# Patient Record
Sex: FEMALE | Race: WHITE | NOT HISPANIC OR LATINO | Employment: FULL TIME | URBAN - METROPOLITAN AREA
[De-identification: names, ages, dates, MRNs, and addresses within clinical notes are randomized per-mention and may not be internally consistent; named-entity substitution may affect disease eponyms.]

---

## 2017-07-25 ENCOUNTER — HOSPITAL ENCOUNTER (OUTPATIENT)
Facility: MEDICAL CENTER | Age: 37
End: 2017-07-25
Payer: COMMERCIAL

## 2017-07-25 LAB
BDY FAT % MEASURED: 38.4 %
BP DIAS: 70 MMHG
BP SYS: 104 MMHG
CHOLEST SERPL-MCNC: 150 MG/DL (ref 100–199)
DIABETES HTDIA: NO
EVENT NAME HTEVT: NORMAL
FASTING HTFAS: YES
GLUCOSE SERPL-MCNC: 75 MG/DL (ref 65–99)
HDLC SERPL-MCNC: 42 MG/DL
HYPERTENSION HTHYP: NO
LDLC SERPL CALC-MCNC: 74 MG/DL
SCREENING LOC CITY HTCIT: NORMAL
SCREENING LOC STATE HTSTA: NORMAL
SCREENING LOCATION HTLOC: NORMAL
SMOKING HTSMO: NO
SUBSCRIBER ID HTSID: NORMAL
TRIGL SERPL-MCNC: 168 MG/DL (ref 0–149)

## 2017-07-25 PROCEDURE — S5190 WELLNESS ASSESSMENT BY NONPH: HCPCS

## 2017-07-25 PROCEDURE — 82947 ASSAY GLUCOSE BLOOD QUANT: CPT

## 2017-07-25 PROCEDURE — 80061 LIPID PANEL: CPT

## 2017-10-10 ENCOUNTER — APPOINTMENT (OUTPATIENT)
Dept: SOCIAL WORK | Facility: CLINIC | Age: 37
End: 2017-10-10

## 2017-10-10 PROCEDURE — 90686 IIV4 VACC NO PRSV 0.5 ML IM: CPT | Performed by: REGISTERED NURSE

## 2017-12-16 ENCOUNTER — OFFICE VISIT (OUTPATIENT)
Dept: URGENT CARE | Facility: CLINIC | Age: 37
End: 2017-12-16
Payer: COMMERCIAL

## 2017-12-16 VITALS
WEIGHT: 187 LBS | HEART RATE: 92 BPM | SYSTOLIC BLOOD PRESSURE: 122 MMHG | TEMPERATURE: 98.5 F | HEIGHT: 62 IN | DIASTOLIC BLOOD PRESSURE: 90 MMHG | BODY MASS INDEX: 34.41 KG/M2 | OXYGEN SATURATION: 95 % | RESPIRATION RATE: 15 BRPM

## 2017-12-16 DIAGNOSIS — J06.9 PROTRACTED URI: ICD-10-CM

## 2017-12-16 PROCEDURE — 99214 OFFICE O/P EST MOD 30 MIN: CPT | Performed by: PHYSICIAN ASSISTANT

## 2017-12-16 RX ORDER — METHYLPREDNISOLONE 4 MG/1
TABLET ORAL
Qty: 21 TAB | Refills: 0 | Status: SHIPPED | OUTPATIENT
Start: 2017-12-16 | End: 2018-01-03

## 2017-12-16 RX ORDER — AZITHROMYCIN 250 MG/1
TABLET, FILM COATED ORAL
Qty: 6 TAB | Refills: 0 | Status: SHIPPED | OUTPATIENT
Start: 2017-12-16 | End: 2018-01-03

## 2017-12-16 RX ORDER — CALCIUM CARBONATE 500 MG/1
500 TABLET, CHEWABLE ORAL DAILY
COMMUNITY
End: 2021-09-08

## 2017-12-16 RX ORDER — FAMOTIDINE 20 MG/1
20 TABLET, FILM COATED ORAL 2 TIMES DAILY
COMMUNITY
End: 2018-04-11

## 2017-12-16 RX ORDER — BENZONATATE 200 MG/1
200 CAPSULE ORAL 3 TIMES DAILY PRN
Qty: 30 CAP | Refills: 0 | Status: SHIPPED | OUTPATIENT
Start: 2017-12-16 | End: 2018-01-03

## 2017-12-16 ASSESSMENT — ENCOUNTER SYMPTOMS
DIZZINESS: 0
FOCAL WEAKNESS: 0
CHILLS: 0
SORE THROAT: 0
FEVER: 0
PALPITATIONS: 0
RHINORRHEA: 1
COUGH: 1
ABDOMINAL PAIN: 0
SHORTNESS OF BREATH: 0
SPUTUM PRODUCTION: 1
WHEEZING: 0
MYALGIAS: 0
SINUS PAIN: 0
NAUSEA: 0
DIARRHEA: 0
HEADACHES: 1
SENSORY CHANGE: 0
VOMITING: 0
TINGLING: 0

## 2017-12-16 ASSESSMENT — COPD QUESTIONNAIRES: COPD: 0

## 2017-12-16 NOTE — PROGRESS NOTES
Subjective:      Lily Vasquez is a 37 y.o. female who presents with URI (started with cold, turn into sinus, now cough, headache, not able to sleep due to coughing x4wks now)            Cough   This is a new problem. Episode onset: 4 weeks  The problem has been gradually worsening. The cough is productive of sputum and productive of purulent sputum. Associated symptoms include headaches, nasal congestion and rhinorrhea. Pertinent negatives include no chest pain, chills, ear congestion, ear pain, fever, myalgias, rash, sore throat, shortness of breath or wheezing. The symptoms are aggravated by lying down. Treatments tried: James, Ibuprofen, Cepacol. The treatment provided mild relief. There is no history of asthma, bronchitis, COPD or pneumonia.       Past Medical History:   Diagnosis Date   • Anesthesia     Severe PONV   • Arthritis     TMJ   • Dental disorder     permanent retainer   • Fatty liver    • GERD (gastroesophageal reflux disease)    • Gestational diabetes    • Hemorrhoids    • Hiatus hernia syndrome    • Indigestion     GERD   • Neck pain    • Vitamin D deficiency      Past Surgical History:   Procedure Laterality Date   • LISBET BY LAPAROSCOPY  12/18/2012    Performed by Idania Camara M.D. at SURGERY [facility] ORS   • LARYNGOSCOPY  7/6/2011    Performed by EDNA MINA at SURGERY SAME DAY [facility] ORS   • ESOPHAGOSCOPY  7/6/2011    Performed by EDNA MINA at SURGERY SAME DAY [facility] ORS   • TONSILLECTOMY AND ADENOIDECTOMY  1991   • OTHER  1991    TONSILLECTOMY        Family History   Problem Relation Age of Onset   • Arthritis Mother    • Hypertension Mother    • Hyperlipidemia Mother    • Psychiatry Father    • Heart Disease Maternal Grandmother    • Heart Disease Maternal Grandfather    • Heart Disease Paternal Grandmother    • Diabetes Paternal Grandmother    • Cancer Paternal Grandfather    • Stroke         Allergies   Allergen Reactions   • Codeine Vomiting     abd pain   •  "Morphine Vomiting   • Sulfa Drugs Vomiting     abd pain   • Tape Contact Dermatitis       Medications, Allergies, and current problem list reviewed today in Epic    Review of Systems   Constitutional: Positive for malaise/fatigue. Negative for chills and fever.   HENT: Positive for congestion and rhinorrhea. Negative for ear discharge, ear pain, sinus pain and sore throat.    Respiratory: Positive for cough and sputum production. Negative for shortness of breath and wheezing.    Cardiovascular: Negative for chest pain, palpitations and leg swelling.   Gastrointestinal: Negative for abdominal pain, diarrhea, nausea and vomiting.   Musculoskeletal: Negative for myalgias.   Skin: Negative for rash.   Neurological: Positive for headaches. Negative for dizziness, tingling, sensory change and focal weakness.       All other systems reviewed and are negative.      Objective:     /90   Pulse 92   Temp 36.9 °C (98.5 °F)   Resp 15   Ht 1.575 m (5' 2\")   Wt 84.8 kg (187 lb)   SpO2 95%   Breastfeeding? No   BMI 34.20 kg/m²      Physical Exam   Constitutional: She is oriented to person, place, and time. She appears well-developed and well-nourished. No distress.   HENT:   Head: Normocephalic and atraumatic.   Right Ear: Tympanic membrane, external ear and ear canal normal.   Left Ear: Tympanic membrane, external ear and ear canal normal.   Nose: Nose normal.   Mouth/Throat: Uvula is midline, oropharynx is clear and moist and mucous membranes are normal.   Neck: Neck supple.   Cardiovascular: Normal rate, regular rhythm and normal heart sounds.  Exam reveals no gallop and no friction rub.    No murmur heard.  Pulmonary/Chest: Effort normal. No respiratory distress. She has no decreased breath sounds. She has wheezes (mild wheeze in RUL). She has no rhonchi. She has no rales.   Lymphadenopathy:     She has no cervical adenopathy.   Neurological: She is alert and oriented to person, place, and time. No cranial nerve " deficit.   Skin: Skin is warm and dry. No rash noted.   Psychiatric: She has a normal mood and affect. Her behavior is normal. Judgment and thought content normal.               Assessment/Plan:     1. Protracted URI  azithromycin (ZITHROMAX) 250 MG Tab    MethylPREDNISolone (MEDROL DOSEPAK) 4 MG Tablet Therapy Pack    benzonatate (TESSALON) 200 MG capsule        •  azithromycin (ZITHROMAX) 250 MG Tab, Take as directed on pack. 1 pack., Disp: 6 Tab, Rfl: 0  •  MethylPREDNISolone (MEDROL DOSEPAK) 4 MG Tablet Therapy Pack, Take as directed on pack. 1 pack., Disp: 21 Tab, Rfl: 0  •  benzonatate (TESSALON) 200 MG capsule, Take 1 Cap by mouth 3 times a day as needed., Disp: 30 Cap, Rfl: 0       Differential diagnoses, Supportive care, and indications for immediate follow-up discussed with patient.   Instructed to return to clinic or nearest emergency department for any change in condition, further concerns, or worsening of symptoms.    The patient demonstrated a good understanding and agreed with the treatment plan.    Raine Alexandra P.A.-C.

## 2017-12-23 ENCOUNTER — OFFICE VISIT (OUTPATIENT)
Dept: URGENT CARE | Facility: CLINIC | Age: 37
End: 2017-12-23
Payer: COMMERCIAL

## 2017-12-23 VITALS
RESPIRATION RATE: 20 BRPM | HEIGHT: 62 IN | SYSTOLIC BLOOD PRESSURE: 128 MMHG | WEIGHT: 187 LBS | DIASTOLIC BLOOD PRESSURE: 82 MMHG | OXYGEN SATURATION: 99 % | HEART RATE: 80 BPM | TEMPERATURE: 98 F | BODY MASS INDEX: 34.41 KG/M2

## 2017-12-23 DIAGNOSIS — J01.90 ACUTE BACTERIAL SINUSITIS: ICD-10-CM

## 2017-12-23 DIAGNOSIS — B96.89 ACUTE BACTERIAL SINUSITIS: ICD-10-CM

## 2017-12-23 PROCEDURE — 99214 OFFICE O/P EST MOD 30 MIN: CPT | Performed by: NURSE PRACTITIONER

## 2017-12-23 RX ORDER — AMOXICILLIN AND CLAVULANATE POTASSIUM 875; 125 MG/1; MG/1
1 TABLET, FILM COATED ORAL 2 TIMES DAILY
Qty: 14 TAB | Refills: 0 | Status: SHIPPED | OUTPATIENT
Start: 2017-12-23 | End: 2017-12-30

## 2017-12-23 ASSESSMENT — ENCOUNTER SYMPTOMS
FEVER: 0
SINUS PRESSURE: 1
HOARSE VOICE: 1
SORE THROAT: 1
SPUTUM PRODUCTION: 1
SINUS PAIN: 1
HEADACHES: 1
COUGH: 1

## 2017-12-23 NOTE — PROGRESS NOTES
Subjective:      Lily Vasquez is a 37 y.o. female who presents with Sinus Problem (X 5 wks stuffy nose , postnasal dripping .)            Sinus Problem   This is a new problem. Episode onset: Pt reports she has had sinus congestion, pain and pressure for 5 weeks. She was treated last week with a Zpak and has not experienced any improvement. She feels her cough is worse as well. The problem has been gradually worsening since onset. There has been no fever. Associated symptoms include congestion, coughing, headaches, a hoarse voice, sinus pressure and a sore throat. Past treatments include antibiotics and oral decongestants. The treatment provided no relief.       Review of Systems   Constitutional: Positive for malaise/fatigue. Negative for fever.   HENT: Positive for congestion, hoarse voice, sinus pain, sinus pressure and sore throat.    Respiratory: Positive for cough and sputum production.    Neurological: Positive for headaches.   All other systems reviewed and are negative.    Past Medical History:   Diagnosis Date   • Anesthesia     Severe PONV   • Arthritis     TMJ   • Dental disorder     permanent retainer   • Fatty liver    • GERD (gastroesophageal reflux disease)    • Gestational diabetes    • Hemorrhoids    • Hiatus hernia syndrome    • Indigestion     GERD   • Neck pain    • Vitamin D deficiency       Past Surgical History:   Procedure Laterality Date   • LISBET BY LAPAROSCOPY  12/18/2012    Performed by Idania Camara M.D. at SURGERY [facility] ORS   • LARYNGOSCOPY  7/6/2011    Performed by EDNA MINA at SURGERY SAME DAY [facility] ORS   • ESOPHAGOSCOPY  7/6/2011    Performed by EDNA MINA at SURGERY SAME DAY [facility] ORS   • TONSILLECTOMY AND ADENOIDECTOMY  1991   • OTHER  1991    TONSILLECTOMY       Social History     Social History   • Marital status:      Spouse name: N/A   • Number of children: N/A   • Years of education: N/A     Occupational History   • Not on file.     Social  "History Main Topics   • Smoking status: Never Smoker   • Smokeless tobacco: Never Used   • Alcohol use No   • Drug use: No   • Sexual activity: Not on file     Other Topics Concern   • Not on file     Social History Narrative   • No narrative on file          Objective:     /82   Pulse 80   Temp 36.7 °C (98 °F)   Resp 20   Ht 1.575 m (5' 2\")   Wt 84.8 kg (187 lb)   SpO2 99%   BMI 34.20 kg/m²      Physical Exam   Constitutional: She is oriented to person, place, and time. Vital signs are normal. She appears well-developed and well-nourished.   HENT:   Head: Normocephalic and atraumatic.   Right Ear: Tympanic membrane and external ear normal.   Left Ear: Tympanic membrane and external ear normal.   Nose: Mucosal edema, rhinorrhea and sinus tenderness present. Right sinus exhibits maxillary sinus tenderness. Left sinus exhibits maxillary sinus tenderness.   Mouth/Throat: Posterior oropharyngeal erythema present.   Eyes: EOM are normal. Pupils are equal, round, and reactive to light.   Neck: Normal range of motion.   Cardiovascular: Normal rate and regular rhythm.    Pulmonary/Chest: Effort normal and breath sounds normal.   Bronchospastic cough   Musculoskeletal: Normal range of motion.   Lymphadenopathy:        Head (right side): Submandibular adenopathy present.        Head (left side): Submandibular adenopathy present.     She has no cervical adenopathy.   Neurological: She is alert and oriented to person, place, and time.   Skin: Skin is warm and dry. Capillary refill takes less than 2 seconds.   Psychiatric: She has a normal mood and affect. Her speech is normal and behavior is normal. Thought content normal.   Vitals reviewed.              Assessment/Plan:     1. Acute bacterial sinusitis  - amoxicillin-clavulanate (AUGMENTIN) 875-125 MG Tab; Take 1 Tab by mouth 2 times a day for 7 days.  Dispense: 14 Tab; Refill: 0    Increase water intake  Start medrol dose manpreet she was given last week, " today  Continue tessalon pearls  OTC decongestant as directed  Supportive care, differential diagnoses, and indications for immediate follow-up discussed with patient.    Pathogenesis of diagnosis discussed including typical length and natural progression.      Instructed to return to  or nearest emergency department if symptoms fail to improve, for any change in condition, further concerns, or new concerning symptoms.  Patient states understanding of the plan of care and discharge instructions.

## 2018-01-03 ENCOUNTER — OFFICE VISIT (OUTPATIENT)
Dept: MEDICAL GROUP | Facility: MEDICAL CENTER | Age: 38
End: 2018-01-03
Payer: COMMERCIAL

## 2018-01-03 VITALS
OXYGEN SATURATION: 100 % | SYSTOLIC BLOOD PRESSURE: 114 MMHG | TEMPERATURE: 98.6 F | HEIGHT: 62 IN | BODY MASS INDEX: 34.78 KG/M2 | WEIGHT: 189 LBS | DIASTOLIC BLOOD PRESSURE: 70 MMHG | HEART RATE: 77 BPM

## 2018-01-03 DIAGNOSIS — E78.1 HYPERTRIGLYCERIDEMIA: ICD-10-CM

## 2018-01-03 DIAGNOSIS — K76.0 FATTY LIVER: ICD-10-CM

## 2018-01-03 DIAGNOSIS — K64.9 HEMORRHOIDS, UNSPECIFIED HEMORRHOID TYPE: ICD-10-CM

## 2018-01-03 DIAGNOSIS — K21.9 GASTROESOPHAGEAL REFLUX DISEASE WITHOUT ESOPHAGITIS: ICD-10-CM

## 2018-01-03 DIAGNOSIS — M26.609 TMJ (TEMPOROMANDIBULAR JOINT SYNDROME): ICD-10-CM

## 2018-01-03 DIAGNOSIS — E28.2 PCOS (POLYCYSTIC OVARIAN SYNDROME): ICD-10-CM

## 2018-01-03 DIAGNOSIS — E66.9 OBESITY (BMI 30.0-34.9): ICD-10-CM

## 2018-01-03 DIAGNOSIS — E55.9 VITAMIN D DEFICIENCY: ICD-10-CM

## 2018-01-03 DIAGNOSIS — K44.9 HIATAL HERNIA: ICD-10-CM

## 2018-01-03 PROBLEM — E66.811 OBESITY (BMI 30.0-34.9): Status: ACTIVE | Noted: 2018-01-03

## 2018-01-03 PROCEDURE — 99214 OFFICE O/P EST MOD 30 MIN: CPT | Performed by: FAMILY MEDICINE

## 2018-01-03 ASSESSMENT — PATIENT HEALTH QUESTIONNAIRE - PHQ9: CLINICAL INTERPRETATION OF PHQ2 SCORE: 0

## 2018-01-04 NOTE — ASSESSMENT & PLAN NOTE
This is a chronic problem. She had gestational diabetes during a pregnancy four years ago and has also been told she was prediabetic in the past. Most recent FBS was 75. She gained 30-40 pounds in the past year and a half after she stopped breastfeeding. She is concerned that this is going to start affecting her long-term health and requests assistance with weight management at this time.

## 2018-01-04 NOTE — PROGRESS NOTES
CC: Weight management    HISTORY OF PRESENT ILLNESS: Patient is a 37 y.o. female established patient who presents today to discuss concerns regarding her weight and co-morbid conditions. She notes that she has gained 30-40 pounds in the past 18 months and now has a BMI over 30, and she is concerned about her long term health, given that she also has fatty liver disease and PCOS. She requests assistance with weight management, as she has a gym membership that she doesn't use and she has gotten into bad dietary habits. She does report that she has had good lifestyle balance during her pregnancy after she was diagnosed with gestational diabetes - she stopped drinking soda and began eating better, but she has not been able to sustain this recently. She states that she needs some accountability.     Health Maintenance: Completed    GERD (Gastroesophageal Reflux Disease)  This is a chronic condition that is managed with TUMs and Pepcid daily. Has tried omeprazole in the past, but this takes too long to become effective. Has noticed that this has worsened with her recent 30-40 pound weight gain in the past year and a half. She does report daily heartburn and has not tried any dietary changes at this time. She does not go a day without OTC medication.     Hiatal hernia  Most recent endoscopy was September 2016.     Hemorrhoid  Chronic condition that is tolerable with current lifestyle.     Vitamin D deficiency  This is a chronic condition. Patient picked up a supplement but has never used it. She has not had a recent vitamin D level drawn, but was diagnosed with this both during a pregnancy and at a routine health screening.     TMJ (temporomandibular joint syndrome)  This is a chronic condition, no concerns at this time.    Obesity (BMI 30.0-34.9)  This is a chronic problem. She had gestational diabetes during a pregnancy four years ago and has also been told she was prediabetic in the past. Most recent FBS was 75. She  gained 30-40 pounds in the past year and a half after she stopped breastfeeding. She is concerned that this is going to start affecting her long-term health and requests assistance with weight management at this time.     Hypertriglyceridemia  Triglycerides 168 at last [facility] biometric screening. We will refer to [facility] Improvement Programs.     PCOS (polycystic ovarian syndrome)  Per patient report.       Patient Active Problem List    Diagnosis Date Noted   • Hiatal hernia 01/03/2018   • Obesity (BMI 30.0-34.9) 01/03/2018   • PCOS (polycystic ovarian syndrome) 01/03/2018   • Hypertriglyceridemia 01/03/2018   • Labor and delivery, indication for care 06/07/2014   • TMJ (temporomandibular joint syndrome)    • Vitamin D deficiency 12/07/2010   • Preventative health care 12/07/2010   • Fatty liver    • GERD (gastroesophageal reflux disease) 09/09/2009   • Hemorrhoid 09/09/2009      Allergies:Codeine; Morphine; Sulfa drugs; and Tape    Current Outpatient Prescriptions   Medication Sig Dispense Refill   • calcium carbonate (TUMS) 500 MG Chew Tab Take 500 mg by mouth every day.     • famotidine (PEPCID) 20 MG Tab Take 20 mg by mouth 2 times a day.     • ibuprofen (MOTRIN) 200 MG Tab Take 200 mg by mouth every 6 hours as needed.       No current facility-administered medications for this visit.        Social History   Substance Use Topics   • Smoking status: Never Smoker   • Smokeless tobacco: Never Used   • Alcohol use No     Social History     Social History Narrative   • No narrative on file       Family History   Problem Relation Age of Onset   • Arthritis Mother    • Hypertension Mother    • Hyperlipidemia Mother    • Psychiatry Father    • Alcohol abuse Father    • Heart Disease Maternal Grandmother    • Heart Disease Maternal Grandfather    • Heart Disease Paternal Grandmother    • Diabetes Paternal Grandmother    • Cancer Paternal Grandfather    • Stroke         Review of Systems:      -  "Constitutional: Negative for fever, chills, unexpected weight change, and fatigue/generalized weakness.     - HEENT: Negative for headaches, vision changes, hearing changes, ear pain, ear discharge, rhinorrhea, sinus congestion, sore throat, and neck pain.      - Respiratory: Negative for cough, sputum production, chest congestion, dyspnea, wheezing, and crackles.      - Cardiovascular: Negative for chest pain, palpitations, orthopnea, and bilateral lower extremity edema.     - Gastrointestinal: Negative for heartburn, nausea, vomiting, abdominal pain, hematochezia, melena, diarrhea, constipation, and greasy/foul-smelling stools.     - Genitourinary: Negative for dysuria, polyuria, hematuria, pyuria, urinary urgency, and urinary incontinence.    - Musculoskeletal: Negative for myalgias, back pain, and joint pain.     - Skin: Negative for rash, itching, cyanotic skin color change.     - Neurological: Negative for dizziness, tingling, tremors, focal sensory deficit, focal weakness and headaches.     - Endo/Heme/Allergies: Does not bruise/bleed easily. Does report history of gestational diabetes, prediabetes and PCOS.    - Psychiatric/Behavioral: Negative for depression, suicidal/homicidal ideation and memory loss. Does display some concern regarding weight and recent death of her mother.              Exam:    Blood pressure 114/70, pulse 77, temperature 37 °C (98.6 °F), height 1.575 m (5' 2\"), weight 85.7 kg (189 lb), last menstrual period 12/27/2017, SpO2 100 %, not currently breastfeeding. Body mass index is 34.57 kg/m².    General:  Well nourished, well developed female in NAD  Head is grossly normal.  Pulmonary:  Normal effort.   Extremities: no clubbing, cyanosis, or edema.  Psychiatric: normal mood and affect, normal judgement    Please note that this dictation was created using voice recognition software. I have made every reasonable attempt to correct obvious errors, but I expect that there are errors of " grammar and possibly content that I did not discover before finalizing the note.    Assessment/Plan:  1. Gastroesophageal reflux disease without esophagitis  This is uncontrolled, but tolerable with current therapies. She is hopeful that weight loss will improve her symptoms.      2. Hiatal hernia  This is stable with current lifestyle measures.     3. TMJ syndrome  This is stable with current lifestyle measures.      4. Obesity (BMI 30.0-34.9)  This is uncontrolled with current lifestyle. We will refer to [facility] to help manage this, as the patient feels she is currently unable to manage this on her own.     5. PCOS (polycystic ovarian syndrome)  This is stable with current lifestyle measures.    6. Fatty liver  - REFERRAL TO [facility] (HIP) Services Requested: Medical Weight Management Program; Reason for Referral? BMI>30; Reason for Visit: Medical Condition Requiring Nutrition Counseling    7. Hypertriglyceridemia  This is uncontrolled on current lifestyle measures, though it has improved in the past year. Referall to [facility] to help with this.     8. Hemorrhoids, unspecified hemorrhoid type  This is stable with current lifestyle measures.     9. Vitamin D deficiency  This is uncontrolled at this time. The patient reports that she has never taken the supplementation that she purchased when she was first diagnosed. We will continue to watch this.     Rachel Carrasco PA-C and I completed this visit and chart together as part of her training in EPIC.  Katie Moffett MD  Provider /educator  [facility]

## 2018-01-04 NOTE — ASSESSMENT & PLAN NOTE
Triglycerides 168 at last [facility] biometric screening. We will refer to [facility] Improvement Programs.

## 2018-01-04 NOTE — ASSESSMENT & PLAN NOTE
This is a chronic condition that is managed with TUMs and Pepcid daily. Has tried omeprazole in the past, but this takes too long to become effective. Has noticed that this has worsened with her recent 30-40 pound weight gain in the past year and a half. She does report daily heartburn and has not tried any dietary changes at this time. She does not go a day without OTC medication.

## 2018-01-08 ENCOUNTER — OFFICE VISIT (OUTPATIENT)
Dept: INTERNAL MEDICINE | Facility: MEDICAL CENTER | Age: 38
End: 2018-01-08
Payer: COMMERCIAL

## 2018-01-08 VITALS
HEIGHT: 62 IN | DIASTOLIC BLOOD PRESSURE: 77 MMHG | WEIGHT: 187.2 LBS | SYSTOLIC BLOOD PRESSURE: 123 MMHG | BODY MASS INDEX: 34.45 KG/M2 | TEMPERATURE: 98.3 F | OXYGEN SATURATION: 97 % | HEART RATE: 95 BPM

## 2018-01-08 DIAGNOSIS — A08.4 VIRAL GASTROENTERITIS: ICD-10-CM

## 2018-01-08 PROCEDURE — 99213 OFFICE O/P EST LOW 20 MIN: CPT | Mod: GC | Performed by: INTERNAL MEDICINE

## 2018-01-08 RX ORDER — DICYCLOMINE HYDROCHLORIDE 10 MG/1
10 CAPSULE ORAL
Qty: 30 CAP | Refills: 0 | Status: SHIPPED | OUTPATIENT
Start: 2018-01-08 | End: 2018-04-11

## 2018-01-08 RX ORDER — BISMUTH SUBSALICYLATE 262 MG/1
524 TABLET, CHEWABLE ORAL
COMMUNITY
End: 2018-04-11

## 2018-01-08 RX ORDER — ONDANSETRON 4 MG/1
4 TABLET, FILM COATED ORAL EVERY 6 HOURS PRN
Qty: 16 TAB | Refills: 0 | Status: SHIPPED | OUTPATIENT
Start: 2018-01-08 | End: 2018-01-12

## 2018-01-08 ASSESSMENT — ENCOUNTER SYMPTOMS
NAUSEA: 1
BLOOD IN STOOL: 0
SORE THROAT: 0
NERVOUS/ANXIOUS: 0
DIZZINESS: 0
BRUISES/BLEEDS EASILY: 0
DOUBLE VISION: 0
CHILLS: 0
DEPRESSION: 0
FALLS: 0
PALPITATIONS: 0
LOSS OF CONSCIOUSNESS: 0
TINGLING: 0
HEARTBURN: 0
ORTHOPNEA: 0
WEIGHT LOSS: 0
NECK PAIN: 0
SPEECH CHANGE: 0
FEVER: 0
CONSTIPATION: 0
FLANK PAIN: 0
COUGH: 0
BACK PAIN: 0
WHEEZING: 0
POLYDIPSIA: 0
EYE REDNESS: 0
MEMORY LOSS: 0
EYE DISCHARGE: 0
BLURRED VISION: 0
SPUTUM PRODUCTION: 0
DIARRHEA: 1
CLAUDICATION: 0
INSOMNIA: 0
SHORTNESS OF BREATH: 0
SEIZURES: 0
DIAPHORESIS: 0
WEAKNESS: 0
ABDOMINAL PAIN: 1
SENSORY CHANGE: 0
VOMITING: 0
HEADACHES: 0
STRIDOR: 0
MYALGIAS: 0

## 2018-01-08 ASSESSMENT — PAIN SCALES - GENERAL: PAINLEVEL: 6=MODERATE PAIN

## 2018-01-08 NOTE — PATIENT INSTRUCTIONS
- please start taking the medications   Viral Gastroenteritis  Viral gastroenteritis is also known as stomach flu. This condition affects the stomach and intestinal tract. It can cause sudden diarrhea and vomiting. The illness typically lasts 3 to 8 days. Most people develop an immune response that eventually gets rid of the virus. While this natural response develops, the virus can make you quite ill.  CAUSES   Many different viruses can cause gastroenteritis, such as rotavirus or noroviruses. You can catch one of these viruses by consuming contaminated food or water. You may also catch a virus by sharing utensils or other personal items with an infected person or by touching a contaminated surface.  SYMPTOMS   The most common symptoms are diarrhea and vomiting. These problems can cause a severe loss of body fluids (dehydration) and a body salt (electrolyte) imbalance. Other symptoms may include:  · Fever.  · Headache.  · Fatigue.  · Abdominal pain.  DIAGNOSIS   Your caregiver can usually diagnose viral gastroenteritis based on your symptoms and a physical exam. A stool sample may also be taken to test for the presence of viruses or other infections.  TREATMENT   This illness typically goes away on its own. Treatments are aimed at rehydration. The most serious cases of viral gastroenteritis involve vomiting so severely that you are not able to keep fluids down. In these cases, fluids must be given through an intravenous line (IV).  HOME CARE INSTRUCTIONS   · Drink enough fluids to keep your urine clear or pale yellow. Drink small amounts of fluids frequently and increase the amounts as tolerated.  · Ask your caregiver for specific rehydration instructions.  · Avoid:  ¨ Foods high in sugar.  ¨ Alcohol.  ¨ Carbonated drinks.  ¨ Tobacco.  ¨ Juice.  ¨ Caffeine drinks.  ¨ Extremely hot or cold fluids.  ¨ Fatty, greasy foods.  ¨ Too much intake of anything at one time.  ¨ Dairy products until 24 to 48 hours after  diarrhea stops.  · You may consume probiotics. Probiotics are active cultures of beneficial bacteria. They may lessen the amount and number of diarrheal stools in adults. Probiotics can be found in yogurt with active cultures and in supplements.  · Wash your hands well to avoid spreading the virus.  · Only take over-the-counter or prescription medicines for pain, discomfort, or fever as directed by your caregiver. Do not give aspirin to children. Antidiarrheal medicines are not recommended.  · Ask your caregiver if you should continue to take your regular prescribed and over-the-counter medicines.  · Keep all follow-up appointments as directed by your caregiver.  SEEK IMMEDIATE MEDICAL CARE IF:   · You are unable to keep fluids down.  · You do not urinate at least once every 6 to 8 hours.  · You develop shortness of breath.  · You notice blood in your stool or vomit. This may look like coffee grounds.  · You have abdominal pain that increases or is concentrated in one small area (localized).  · You have persistent vomiting or diarrhea.  · You have a fever.  · The patient is a child younger than 3 months, and he or she has a fever.  · The patient is a child older than 3 months, and he or she has a fever and persistent symptoms.  · The patient is a child older than 3 months, and he or she has a fever and symptoms suddenly get worse.  · The patient is a baby, and he or she has no tears when crying.  MAKE SURE YOU:   · Understand these instructions.  · Will watch your condition.  · Will get help right away if you are not doing well or get worse.     This information is not intended to replace advice given to you by your health care provider. Make sure you discuss any questions you have with your health care provider.     Document Released: 12/18/2006 Document Revised: 03/11/2013 Document Reviewed: 10/03/2012  [email or web address] Interactive Patient Education ©2016 [email or web address]

## 2018-01-08 NOTE — PROGRESS NOTES
Established Patient    Lily presents today with the following:    CC: Abdominal pain, nausea and diarrhea    HPI: Mrs. Vasquez is a pleasant 37 years old female with past medical history gastroesophageal reflux disease, vitamin D deficiency, polycystic ovary syndrome and hypertriglyceridemia presented to the clinic today because of abdominal pain started 3 days ago, after she had dinner at a restaurant, pain is crampy in nature, on and off, 5-7/10 in intensity, does not radiate anywhere, increased with eating, associated with nausea and diarrhea, diarrhea is watery, 3-4 times per day, no blood or bad odor, patient reported feeling cold yesterday, had mild headache last night that is normal for her. Denies fever, sick contact, no one sick of her family members who had dinner with her.     Patient Active Problem List    Diagnosis Date Noted   • Hiatal hernia 01/03/2018   • Obesity (BMI 30.0-34.9) 01/03/2018   • PCOS (polycystic ovarian syndrome) 01/03/2018   • Hypertriglyceridemia 01/03/2018   • Labor and delivery, indication for care 06/07/2014   • TMJ (temporomandibular joint syndrome)    • Vitamin D deficiency 12/07/2010   • Preventative health care 12/07/2010   • Fatty liver    • GERD (gastroesophageal reflux disease) 09/09/2009   • Hemorrhoid 09/09/2009       Current Outpatient Prescriptions   Medication Sig Dispense Refill   • bismuth subsalicylate (PEPTO-BISMOL) 262 MG Chew Tab Take 524 mg by mouth 4 Times a Day,Before Meals and at Bedtime.     • dicyclomine (BENTYL) 10 MG Cap Take 1 Cap by mouth 4 Times a Day,Before Meals and at Bedtime. 30 Cap 0   • ondansetron (ZOFRAN) 4 MG Tab tablet Take 1 Tab by mouth every 6 hours as needed for Nausea/Vomiting for up to 4 days. 16 Tab 0   • calcium carbonate (TUMS) 500 MG Chew Tab Take 500 mg by mouth every day.     • famotidine (PEPCID) 20 MG Tab Take 20 mg by mouth 2 times a day.     • ibuprofen (MOTRIN) 200 MG Tab Take 200 mg by mouth every 6 hours as needed.       No  "current facility-administered medications for this visit.        ROS: As per HPI. Additional pertinent symptoms as noted below.  Review of Systems   Constitutional: Negative for chills, diaphoresis, fever and weight loss.   HENT: Negative for ear discharge, sore throat and tinnitus.    Eyes: Negative for blurred vision, double vision, discharge and redness.   Respiratory: Negative for cough, sputum production, shortness of breath, wheezing and stridor.    Cardiovascular: Negative for chest pain, palpitations, orthopnea, claudication and leg swelling.   Gastrointestinal: Positive for abdominal pain, diarrhea and nausea. Negative for blood in stool, constipation, heartburn, melena and vomiting.   Genitourinary: Negative for dysuria, flank pain, frequency, hematuria and urgency.   Musculoskeletal: Negative for back pain, falls, myalgias and neck pain.   Skin: Negative for itching and rash.   Neurological: Negative for dizziness, tingling, sensory change, speech change, seizures, loss of consciousness, weakness and headaches.   Endo/Heme/Allergies: Negative for polydipsia. Does not bruise/bleed easily.   Psychiatric/Behavioral: Negative for depression, memory loss and suicidal ideas. The patient is not nervous/anxious and does not have insomnia.          /77   Pulse 95   Temp 36.8 °C (98.3 °F)   Ht 1.575 m (5' 2\")   Wt 84.9 kg (187 lb 3.2 oz)   LMP 12/27/2017   SpO2 97%   BMI 34.24 kg/m²     Physical Exam   Constitutional: Oriented to person, place, and time and well-developed, well-nourished, and in no distress. Vital signs are normal.   HENT:   Head: Normocephalic and atraumatic.   Mouth/Throat: Oropharynx is clear and moist.   Eyes: Conjunctivae are normal.   Neck: Neck supple. No JVD present. No tracheal deviation present.   Cardiovascular: Normal rate.  Exam reveals no gallop and no friction rub.    No murmur heard.  Pulmonary/Chest: Effort normal and breath sounds normal. No respiratory distress. She " has no wheezes. She has no rales. She exhibits no tenderness.   Abdominal: Soft. Bowel sounds are normal. She exhibits no mass. There is no tenderness. There is no rebound and no guarding, Stratton's sign negative, no CVA tenderness.   Musculoskeletal: Normal range of motion. She exhibits no edema.   Lymphadenopathy:     She has no cervical adenopathy.   Neurological: She is alert and oriented to person, place, and time. She has normal strength.   Skin: No rash noted. No erythema. No pallor.       Assessment and Plan    1. Viral gastroenteritis  Hx: 37 years old female presented with abdominal pain started 3 days ago, pain is crampy in nature, on and off, 5-7/10 in intensity, does not radiate anywhere, increased with eating, associated with nausea and diarrhea, diarrhea is watery, 3-4 times per day, no blood or bad odor. Denies fever and sick contact.   Physical exam: normal abdominal examination, no tenderness, negative Stratton sign and CVA tenderness  Plan:  - Most likely patient has viral gastroenteritis, no fever, chills, mucus and blood with stool, will manage the patient conservatively with supportive treatment and instruct her to follow-up if her symptoms failed to improve or deteriorate within 5 days.  - Ordered Zofran for nausea  - Ordered dicyclomine when necessary for cramps          Signed by: Yan Hutton M.D.

## 2018-01-24 ENCOUNTER — NON-PROVIDER VISIT (OUTPATIENT)
Dept: HEALTH INFORMATION MANAGEMENT | Facility: MEDICAL CENTER | Age: 38
End: 2018-01-24
Payer: COMMERCIAL

## 2018-01-24 VITALS
WEIGHT: 188.8 LBS | SYSTOLIC BLOOD PRESSURE: 108 MMHG | BODY MASS INDEX: 34.74 KG/M2 | OXYGEN SATURATION: 98 % | DIASTOLIC BLOOD PRESSURE: 60 MMHG | HEIGHT: 62 IN | TEMPERATURE: 98.2 F | HEART RATE: 77 BPM

## 2018-01-24 VITALS — BODY MASS INDEX: 34.73 KG/M2 | WEIGHT: 188.71 LBS | HEIGHT: 62 IN

## 2018-01-24 DIAGNOSIS — K76.0 FATTY LIVER: ICD-10-CM

## 2018-01-24 DIAGNOSIS — R63.5 WEIGHT GAIN, ABNORMAL: ICD-10-CM

## 2018-01-24 DIAGNOSIS — K21.9 GASTROESOPHAGEAL REFLUX DISEASE WITHOUT ESOPHAGITIS: ICD-10-CM

## 2018-01-24 DIAGNOSIS — E55.9 VITAMIN D DEFICIENCY: ICD-10-CM

## 2018-01-24 DIAGNOSIS — G47.9 SLEEPING DIFFICULTY: ICD-10-CM

## 2018-01-24 DIAGNOSIS — E78.1 HYPERTRIGLYCERIDEMIA: ICD-10-CM

## 2018-01-24 PROCEDURE — 99205 OFFICE O/P NEW HI 60 MIN: CPT | Mod: 25 | Performed by: INTERNAL MEDICINE

## 2018-01-24 PROCEDURE — 93000 ELECTROCARDIOGRAM COMPLETE: CPT | Performed by: INTERNAL MEDICINE

## 2018-01-24 PROCEDURE — 97802 MEDICAL NUTRITION INDIV IN: CPT | Performed by: DIETITIAN, REGISTERED

## 2018-01-24 ASSESSMENT — PAIN SCALES - GENERAL: PAINLEVEL: NO PAIN

## 2018-01-24 NOTE — PROGRESS NOTES
Bariatric Medicine H&P  Chief Complaint   Patient presents with   • Weight Gain       Referred by:  Katie Moffett M.D.    History of Present Illness:   Lily Vasquez is a 37 y.o.  female who presents for weight management and to help address co-morbidities related to overweight, including difficulty sleeping, PCOS, hypertriglyceridemia, vitamin D deficiency, GERD, fatty liver.    The patient presents frustrated with 30-40 pound weight gain over the last 2 years. She was diagnosed with gestational diabetes during her last pregnancy, also PCOS. Had infertility issues. She lost weight during her pregnancy, because she was concerned about her gestational diabetes, significantly reduced her junk food intake and did well. However, over the last 2 years, she returned to work and has more stress and is eating all day. Her GERD has also worsened significantly. She did have an EGD and colonoscopy at [facility], who thought she had IBS and a dairy intolerance. She has sent since cut out most dairy, but still has GERD symptoms all day, despite an H2 blocker.    The patient's mom  4 months ago, so she is been less active and is now gaining even more weight.    The patient's typical intake includes Abbott's or flavored yogurt for breakfast or toast. Sometimes she has Premier protein. For lunch, she has sandwiches, chips, soup or cereal or leftovers. For dinner she has meat, potatoes, vegetables. She snacks all day on potatoes, cookies, and fruit. Does not snack much after dinner. She knows her portion sizes are large and she thinks she takes in a lot of calories. She drinks 64+ ounces of soda per day. She does not skip meals.    The patient was in Weight Watchers in the past, does not like to track her intake, does not feel she has the time and does not want to do that or see that is realistic. She is interested in meal replacements, but would like to use premier protein that she has at  "home      Behavior-Related History:  Binge eating screen: Positive  Describes herself as an emotional eater, eats until uncomfortably full and feels bad afterwards when she is stressed     Exercise:   None. Has a gym membership, but not going since her mother passed away 4 months ago.    Life Style Changes:  Has 3 children at home, working full-time, stressed most days     Review of Systems   Positive for insomnia, only sleeps 3-4 hours a day, frequent diarrhea and constipation with lactose, and bloating. Worse over the last 1.5 years.  Sleep apnea screen: Positive  All other ROS were reviewed with patient today and are negative.      PMH/PSH:  I have reviewed the patient's medical, social and family history, allergies, and medications today.  Prior records reviewed.  Personal Hx of Bariatric Surgery: No      Physical Exam:   /60   Pulse 77   Temp 36.8 °C (98.2 °F)   Ht 1.575 m (5' 2\")   Wt 85.6 kg (188 lb 12.8 oz)   LMP 12/27/2017   SpO2 98%   BMI 34.53 kg/m²    Waist: 41.5 in  Body fat % 46.3  REE 1542 kcal/day    Constitutional: Oriented to person, place, and time and well-developed, well-nourished, and in no distress.    HENT: No facial plethora.  No Cushingoid features.  No scalloped tongue.  No dental erosions.  No swollen parotids.  Head: Normocephalic.   Eyes: EOM are normal. Pupils are equal, round, and reactive to light. No periorbital edema.  No lateral thinning of eyebrows.  No vertical nystagmus.  Neck: Normal range of motion. Neck supple. No thyromegaly present. No buffalo hump.  Cardiovascular: Normal rate and regular rhythm.  No murmur heard.  Pulmonary/Chest: Effort normal and breath sounds normal. No wheezes.   Abdominal: Soft. Bowel sounds are normal. No pannus.  No ascites.  No hepatosplenomegaly.  No red striae.  Musculoskeletal: Normal range of motion. No edema.   Neurological: Alert and oriented to person, place, and time. Normal reflexes. No cranial nerve deficit. No muscle " weakness.  Gait normal.   Skin: Warm and dry. Not diaphoretic. No hirsuitism.  No acanthosis nigricans.  Not excessively dry, scaly.  No acne.  No bruising/ecchymosis.  No hyperpigmentation.  Few back xanthomas.  No violaceous striae.  No keratosis pilaris.  Psychiatric: Mood, memory, affect and judgment normal.     Laboratory:   Prior labs reviewed. 7/2017   EKG: Sinus rhythm, rate 65, corrected QT 0.37, no acute ST-T change  Ordered and reviewed by me today.    Dietitian Assessment: I have reviewed the Dietitian's assessment related to this encounter.       ASSESSMENT/PLAN:  Body mass index is 34.53 kg/m².   Obesity Stage ([place]) 2; Class 1    1. Weight gain, abnormal  BASIC METABOLIC PANEL    CBC WITHOUT DIFFERENTIAL    TSH WITH REFLEX TO FT4    REFERRAL TO PULMONOLOGY   2. Hypertriglyceridemia  BASIC METABOLIC PANEL   3. Vitamin D deficiency  VITAMIN 1,25 DIHYDROXY   4. Fatty liver  BASIC METABOLIC PANEL   5. Gastroesophageal reflux disease without esophagitis     6. Sleeping difficulty  CBC WITHOUT DIFFERENTIAL    TSH WITH REFLEX TO FT4    REFERRAL TO PULMONOLOGY     Despite the patient's class I obesity, she does have multiple comorbidities related to her ongoing weight gain. She has a risk of metabolic syndrome, has hypertriglyceridemia and fatty liver from excessive carbohydrate intake. She needs to significantly change her intake and seems ready to make some changes. Her GERD should also significantly improve with reducing refined carbohydrate intake and junk food. She should get a sleep study given her significant sleep difficulties. We will update her labs as well.    The patient and I have discussed at length and agree to the following recommendations, which are all addressing the above diagnoses:    Weight Goal: 5% wt loss at one month after start (pt goal weight is 135 lb)  Diet:   Significantly reduce carbs as per discussion with RD  Use Premier Protein or Robard MR for am or lunch  daily  Eliminate soda, drink water 64+ oz per day  Physical Activity: Gym once per week for 60 minutes to start  Risk level for moderate/vigorous exercise program: Low  New Rx: None at this time. Consider weight loss medications, pending course.  Side Effects: Will review consent if applicable.  Behavior change: Stimulus narrowing, more mindful eating  Follow-up: 2 weeks    Face to face time spent 60 minutes,  with >50% of time devoted to one on one counseling on weight management issues, as documented above.      Thank you for your referral!

## 2018-01-24 NOTE — PROGRESS NOTES
"1/24/2018 37 y.o.   Referring Provider: Katie Moffett M.D.       Time in/out: 9:02-9:24    Anthropometrics/Objective  Today's weight: 188.8 lbs  Height: 5'2\"  BMI: 34.52    Stated Goal Weight: 135 lbs  See comprehensive patient history form for further information     Subjective:  Pt is here today for the initial screening visit for the medical weight management program.   - Experienced weight gain after having twins (has 3 kids)   - Has lost weight in the past, but struggling with recent poor eating habits  - Grazing throughout the day on chips, cookies, crackers, candy  - Breakfast at [employer]   - Eats lunch at home, grab and go  - Currently drinking soda  - Tried Weight Watchers in the past, does not like the idea of tracking or counting calories  - Normally having fast food out for dinner, but starting to cook more at home  - Has premier protein, willing to try as a meal replacement for breakfast    See Medical Questionnaire for more detailed diet history     Nutrition Diagnosis (PES Statement)  · Obesity related to excessive energy intake and inadequate energy expenditure as evidenced by BMI 34.52     Client history:  Condition(s) associated with a diagnosis or treatment: difficulty sleeping, PCOS, hypertriglyceridemia, GERD, fatty liver.    Biochemical data, medical test and procedures  Lab Results   Component Value Date/Time    HBA1C 6.0 (H) 06/28/2016 08:19 AM   @  Lab Results   Component Value Date/Time    POCGLUCOSE 65 06/09/2014 07:26 AM     Lab Results   Component Value Date/Time    CHOLSTRLTOT 150 07/25/2017 08:30 AM    LDL 74 07/25/2017 08:30 AM    HDL 42 07/25/2017 08:30 AM    TRIGLYCERIDE 168 (H) 07/25/2017 08:30 AM         Nutrition Intervention    Meal Plan Recommendation   Recommend 1 meal replacements with 2 grocery meals and 2 snacks  per day    Comprehensive Nutrition Education Instruction or training leading to in-depth nutrition related knowledge about:  Benefits to following meal plan, " Combine carb, protein and fat at each meal, Eating out, Fast food, Meal timing and spacing, Menu Planning, Physical activity/exercise, Portion control and Handouts provided regarding topics discussed     Monitoring & Evaluation Plan    Behavioral-Environmental:  Behavior: Keep a food journal and bring to next appointment   Physical activity: Start back at gym one day a week     Food / Nutrient Intake:  Food intake: Follow meal plan as discussed (see above). Avoid concentrated sweets and processed carbs.  Choose from recommended CHO list for meals. Have 1 oz protein + non-starchy vegetables at snacks; may choose a piece of fruit to avoid reaching for sweets.   Fluid intake: Consume at least 64 oz water per day. Avoid all unsweetened beverages. Limit coffee to 1 cup a day (ideally no cream or sugar). Avoid alcohol.      Physical Signs / Symptoms:  Weight change to goal       Assessment Notes:  Pt states she is ready to make changes to her eating habits and lifestyle. She is going to try a meal replacement for breakfast (she has premier protein at home) and use the plate method with approved carbohydrates to plan her lunch and dinners. She will stop drinking soda, prepare/buy some healthier snacks from the list provided and empty out her candy and sweets drawer at work. She plans to make time for the gym at least 1 day a week and is going to keep a food journal. Pt is not interested in counting calories and more interested in making overall better food choices. Will review her food journal on the next visit.     Follow up 2 weeks

## 2018-01-24 NOTE — PATIENT INSTRUCTIONS
Significantly reduce carbs as per discussion with RD  Use Premier Protein or Robard MR for am or lunch daily  Eliminate soda, drink water 64+ oz per day  Return to gym at least once per week for 60 min

## 2018-01-27 ENCOUNTER — HOSPITAL ENCOUNTER (OUTPATIENT)
Dept: LAB | Facility: MEDICAL CENTER | Age: 38
End: 2018-01-27
Attending: INTERNAL MEDICINE
Payer: COMMERCIAL

## 2018-01-27 DIAGNOSIS — K76.0 FATTY LIVER: ICD-10-CM

## 2018-01-27 DIAGNOSIS — E78.1 HYPERTRIGLYCERIDEMIA: ICD-10-CM

## 2018-01-27 DIAGNOSIS — G47.9 SLEEPING DIFFICULTY: ICD-10-CM

## 2018-01-27 DIAGNOSIS — R63.5 WEIGHT GAIN, ABNORMAL: ICD-10-CM

## 2018-01-27 DIAGNOSIS — E55.9 VITAMIN D DEFICIENCY: ICD-10-CM

## 2018-01-27 LAB
ANION GAP SERPL CALC-SCNC: 8 MMOL/L (ref 0–11.9)
BUN SERPL-MCNC: 11 MG/DL (ref 8–22)
CALCIUM SERPL-MCNC: 9.5 MG/DL (ref 8.5–10.5)
CHLORIDE SERPL-SCNC: 102 MMOL/L (ref 96–112)
CO2 SERPL-SCNC: 26 MMOL/L (ref 20–33)
CREAT SERPL-MCNC: 0.85 MG/DL (ref 0.5–1.4)
ERYTHROCYTE [DISTWIDTH] IN BLOOD BY AUTOMATED COUNT: 45.7 FL (ref 35.9–50)
GLUCOSE SERPL-MCNC: 86 MG/DL (ref 65–99)
HCT VFR BLD AUTO: 43.9 % (ref 37–47)
HGB BLD-MCNC: 13.6 G/DL (ref 12–16)
MCH RBC QN AUTO: 25.9 PG (ref 27–33)
MCHC RBC AUTO-ENTMCNC: 31 G/DL (ref 33.6–35)
MCV RBC AUTO: 83.6 FL (ref 81.4–97.8)
PLATELET # BLD AUTO: 393 K/UL (ref 164–446)
PMV BLD AUTO: 10.9 FL (ref 9–12.9)
POTASSIUM SERPL-SCNC: 4 MMOL/L (ref 3.6–5.5)
RBC # BLD AUTO: 5.25 M/UL (ref 4.2–5.4)
SODIUM SERPL-SCNC: 136 MMOL/L (ref 135–145)
TSH SERPL DL<=0.005 MIU/L-ACNC: 2.19 UIU/ML (ref 0.38–5.33)
WBC # BLD AUTO: 8.6 K/UL (ref 4.8–10.8)

## 2018-01-27 PROCEDURE — 84443 ASSAY THYROID STIM HORMONE: CPT

## 2018-01-27 PROCEDURE — 85027 COMPLETE CBC AUTOMATED: CPT

## 2018-01-27 PROCEDURE — 82652 VIT D 1 25-DIHYDROXY: CPT

## 2018-01-27 PROCEDURE — 80048 BASIC METABOLIC PNL TOTAL CA: CPT

## 2018-01-27 PROCEDURE — 36415 COLL VENOUS BLD VENIPUNCTURE: CPT

## 2018-01-30 LAB — 1,25(OH)2D3 SERPL-MCNC: 23.7 PG/ML (ref 19.9–79.3)

## 2018-02-07 ENCOUNTER — NON-PROVIDER VISIT (OUTPATIENT)
Dept: HEALTH INFORMATION MANAGEMENT | Facility: MEDICAL CENTER | Age: 38
End: 2018-02-07
Payer: COMMERCIAL

## 2018-02-07 VITALS
SYSTOLIC BLOOD PRESSURE: 110 MMHG | WEIGHT: 184.1 LBS | BODY MASS INDEX: 33.88 KG/M2 | HEIGHT: 62 IN | HEART RATE: 74 BPM | TEMPERATURE: 98.7 F | DIASTOLIC BLOOD PRESSURE: 62 MMHG | OXYGEN SATURATION: 99 %

## 2018-02-07 VITALS — HEIGHT: 62 IN | BODY MASS INDEX: 33.88 KG/M2 | WEIGHT: 184.1 LBS

## 2018-02-07 DIAGNOSIS — E78.1 HYPERTRIGLYCERIDEMIA: ICD-10-CM

## 2018-02-07 DIAGNOSIS — K76.0 FATTY LIVER: ICD-10-CM

## 2018-02-07 DIAGNOSIS — R63.5 WEIGHT GAIN, ABNORMAL: ICD-10-CM

## 2018-02-07 DIAGNOSIS — E55.9 VITAMIN D DEFICIENCY: ICD-10-CM

## 2018-02-07 DIAGNOSIS — G47.9 SLEEPING DIFFICULTY: ICD-10-CM

## 2018-02-07 DIAGNOSIS — E66.9 OBESITY (BMI 30.0-34.9): ICD-10-CM

## 2018-02-07 PROCEDURE — 97803 MED NUTRITION INDIV SUBSEQ: CPT | Performed by: DIETITIAN, REGISTERED

## 2018-02-07 PROCEDURE — 99213 OFFICE O/P EST LOW 20 MIN: CPT | Performed by: INTERNAL MEDICINE

## 2018-02-07 NOTE — PROGRESS NOTES
"Nutrition Reassess: Medical Weight Management   Today's date: 2/7/2018  Referring Provider: Katie Moffett M.D.      Time: in/out 10:02- 10:16    Subjective:  - no soda in over 2 weeks  - gym 1 day a week, 20 minute walks 2x/day, did 90 minute strength training at gym yesterday   - consistent with meal replacement at breakfast  - sometimes going over on portions of healthy food  - struggles to not eat out with kids on the go    Anthropometrics/Objective  Today's weight: 184.1 lbs  Height: 5'2\"  BMI: 33.67  Starting weight/date 188.8     Total weight change : - 4.7 lb         Stated Goal Weight: 135 lb     Meal Plan:   2 grocery meals with 1 meal replacements per day and 2 snacks     ReAssesment/Notes:  Pt has made some significant changes by cutting out soda and increasing her vegetable intake. She is tracking with a food journal and has been consistent with adding in exercise. She is going to increase her gym time to 2 days a week, continue walking 20 min two times a day and work on her portion control. She has been listening closer to her hunger cues and working on finding alternatives to alleviating stress other than food. She is eating fast food a few times a week which has been a barrier for her that she is aware will need to limit if she wants to continues towards her goal. Provided her with meal ideas and tips for portion control.     Follow-up: 1 month    "

## 2018-02-07 NOTE — PROGRESS NOTES
"Bariatric Medicine Follow Up  Chief Complaint   Patient presents with   • Weight Gain       History of Present Illness:   Lily Vasquez is a 37 y.o. female who presents for weight management follow-up and to help address co-morbidities related to overweight, including sleeping difficulty, hypertriglyceridemia, vitamin D deficiency.    During the patient's last visit, the following were discussed and recommended:  Weight Goal: 5% wt loss at one month after start (pt goal weight is 135 lb)  Diet:   Significantly reduce carbs as per discussion with RD  Use Premier Protein or Robard MR for am or lunch daily  Eliminate soda, drink water 64+ oz per day  Physical Activity: Gym once per week for 60 minutes to start  Risk level for moderate/vigorous exercise program: Low  New Rx: None at this time. Consider weight loss medications, pending course.  Side Effects: Will review consent if applicable.  Behavior change: Stimulus narrowing, more mindful eating  Follow-up: 2 weeks    The patient is doing really well so far. She is excited about how much and she has and how much better she feels. \"I feel 10 times better\" she has stopped drinking soda for the last 12 days. She's been going to the gym and able to keep up.    The patient has one from your protein shake for breakfast, high-protein/low-carb snacks between meals as prescribed, a lunch with salad and protein, dinner with protein and a vegetable. She is not feeling hungry. She ran out of her Pepcid for GERD, has not needed to replace it except have occasional Tums. Her GERD has much improved.    The patient is reading the book on emotional eating, has helped her quite a bit, she is able to calm down and deal with her stress much better. She has found herself wanting to eat when she is stressed, but is not doing it. She is trying hard to stay focused.    Exercise:   Gym regularly with a , is able to keep up easily     Review of Systems   No hunger. Insomnia " "about the same, but she admits she stays up late binge watching [employer].  All other ROS were reviewed and are otherwise unchanged from my previous visit with patient.    Physical Exam:    /62   Pulse 74   Temp 37.1 °C (98.7 °F)   Ht 1.575 m (5' 2\")   Wt 83.5 kg (184 lb 1.6 oz)   LMP 12/27/2017   SpO2 99%   BMI 33.67 kg/m²    Waist: 40 in  Body fat % 44.7  REE 1524 kcal/day    Weight change since last visit: -4.7 lb (-4.7 lb total)  Waist Circum change since last visit: -1.5 in (-1.5 in total)    Constitutional: Oriented to person, place, and time and well-developed, well-nourished, and in no distress.    Head: Normocephalic.   Musculoskeletal: Normal range of motion. No edema.   Neurological: Alert and oriented to person, place, and time. No muscle weakness.  Gait normal.   Skin: Warm and dry. Not diaphoretic.   Psychiatric: Mood, memory, affect and judgment normal.     Laboratory:   None new    Dietitian Assessment: I have reviewed the Dietitian's assessment related to this encounter.       ASSESSMENT/PLAN:  Body mass index is 33.67 kg/m².    Obesity Stage ([place]):  1; Class 1    1. Weight gain, abnormal     2. Sleeping difficulty     3. Hypertriglyceridemia     4. Vitamin D deficiency     5. Obesity (BMI 30.0-34.9)       The patient has had good success so far. Her weight gain is beginning to reverse. She has lost almost 5 pounds, and 1.5 inches off her waist. We will continue to encourage better sleep hygiene. We will monitor her triglycerides with labs after the next visit. Her last vitamin D level was normal, not requiring vitamin D repletion. We will continue to encourage low-carb/high protein intake with a meal replacement.    The patient and I have discussed at length and agree to the following recommendations, which are all addressing the above diagnoses:    Weight Goal: 3-5% wt loss each month (pt goal weight is 135 lb)  Diet:Continue low carb/high protein meals and snacks   Use Premier " Protein MR for am   Eliminate soda, drink water 64+ oz per day  Physical Activity: Gym with a  as she is doing  Risk level for moderate/vigorous exercise program: Low  New Rx: None  Side Effects: Will review consent if applicable.  Behavior change: Continue following guidelines from book on emotional eating, mindful eating, stress management  Follow-up: One month  Order lipids and vitamin D level after next visit

## 2018-02-07 NOTE — PATIENT INSTRUCTIONS
Continue low carb/high protein meals and snacks   Use Premier Protein MR for am   Eliminate soda, drink water 64+ oz per day  Continue gym as you are doing!

## 2018-03-07 ENCOUNTER — NON-PROVIDER VISIT (OUTPATIENT)
Dept: HEALTH INFORMATION MANAGEMENT | Facility: MEDICAL CENTER | Age: 38
End: 2018-03-07
Payer: COMMERCIAL

## 2018-03-07 ENCOUNTER — HOSPITAL ENCOUNTER (OUTPATIENT)
Facility: MEDICAL CENTER | Age: 38
End: 2018-03-07
Attending: FAMILY MEDICINE
Payer: COMMERCIAL

## 2018-03-07 ENCOUNTER — OFFICE VISIT (OUTPATIENT)
Dept: MEDICAL GROUP | Facility: MEDICAL CENTER | Age: 38
End: 2018-03-07
Payer: COMMERCIAL

## 2018-03-07 VITALS
TEMPERATURE: 98.2 F | HEIGHT: 62 IN | BODY MASS INDEX: 32.46 KG/M2 | SYSTOLIC BLOOD PRESSURE: 108 MMHG | DIASTOLIC BLOOD PRESSURE: 64 MMHG | WEIGHT: 176.4 LBS | OXYGEN SATURATION: 99 % | HEART RATE: 80 BPM

## 2018-03-07 VITALS
WEIGHT: 176 LBS | HEIGHT: 62 IN | DIASTOLIC BLOOD PRESSURE: 78 MMHG | SYSTOLIC BLOOD PRESSURE: 114 MMHG | TEMPERATURE: 98.5 F | HEART RATE: 80 BPM | RESPIRATION RATE: 16 BRPM | BODY MASS INDEX: 32.39 KG/M2 | OXYGEN SATURATION: 97 %

## 2018-03-07 VITALS — WEIGHT: 176.4 LBS | BODY MASS INDEX: 32.46 KG/M2 | HEIGHT: 62 IN

## 2018-03-07 DIAGNOSIS — K76.0 FATTY LIVER: ICD-10-CM

## 2018-03-07 DIAGNOSIS — J02.9 PHARYNGITIS, UNSPECIFIED ETIOLOGY: ICD-10-CM

## 2018-03-07 DIAGNOSIS — E78.1 HYPERTRIGLYCERIDEMIA: ICD-10-CM

## 2018-03-07 DIAGNOSIS — R63.5 WEIGHT GAIN, ABNORMAL: ICD-10-CM

## 2018-03-07 DIAGNOSIS — K21.9 GASTROESOPHAGEAL REFLUX DISEASE WITHOUT ESOPHAGITIS: ICD-10-CM

## 2018-03-07 DIAGNOSIS — E66.9 OBESITY (BMI 30.0-34.9): ICD-10-CM

## 2018-03-07 DIAGNOSIS — E55.9 VITAMIN D DEFICIENCY: ICD-10-CM

## 2018-03-07 DIAGNOSIS — G47.9 SLEEPING DIFFICULTY: ICD-10-CM

## 2018-03-07 LAB
INT CON NEG: NORMAL
INT CON POS: NORMAL
S PYO AG THROAT QL: NORMAL

## 2018-03-07 PROCEDURE — 87070 CULTURE OTHR SPECIMN AEROBIC: CPT

## 2018-03-07 PROCEDURE — 87880 STREP A ASSAY W/OPTIC: CPT | Performed by: FAMILY MEDICINE

## 2018-03-07 PROCEDURE — 99213 OFFICE O/P EST LOW 20 MIN: CPT | Performed by: INTERNAL MEDICINE

## 2018-03-07 PROCEDURE — 99213 OFFICE O/P EST LOW 20 MIN: CPT | Performed by: FAMILY MEDICINE

## 2018-03-07 PROCEDURE — 97803 MED NUTRITION INDIV SUBSEQ: CPT | Performed by: DIETITIAN, REGISTERED

## 2018-03-07 NOTE — PROGRESS NOTES
"Nutrition Reassess: Medical Weight Management   Today's date: 3/7/2018  Referring Provider: Katie Moffett M.D.      Time: in/out 10:06-10:18am    Subjective:  - going to the gym 2 days a week doing cardio  - continues to decrease eating out habit, when she does making better choices and not allowing it to take her completely off track  - increased energy  - standing at desk at work for at least 4 hours, wanting to move more now that she has more energy  - getting a little bored with premier protein drink at breakfast, feels like she's eating a lot of protein  - following meal plan recommendations; plate guide & protein + NS veg snack   - keeping detailed food log which helps her to have a visual for staying on track    Anthropometrics/Objective  Today's weight: 176.4#  Height: 5'2\"  BMI: 32.3  Starting weight/date 188.8#     Total weight change : 12.4#       Stated Goal Weight: 135#    Meal Plan:   Low carb diet with 2 grocery meals and 1 meal replacements (premier protein) per day + 2 snacks    ReAssesment/Notes:  Lily is following her meal plan as recommended and has significantly changed her eating patterns and habits. Subsequently she has noticed an increase in energy, not had to use pepcid in a few months and is overall feeling better about herself. She ate fast food once in the last week, but made the decision to have a kids meal without a soda. She is understanding that \"life\" will happen and choosing to still make better choices in these situations. I like that she is setting goals that are not just weight related to help keep her on track; she would like to be able to wear her wedding ring again and start rock climbing. She is worried about hitting a plateau, but I told her we would cross that bridge if it happens. She is getting bored with her Premier protein shake at breakfast, so gave her a list of other protein shake options and guidelines for shopping for a protein powder.  She is going to think " about adding a 3rd day at the gym or choosing purposeful movement or a fun exercise or activity class to attend.     Follow-up: 4 weeks

## 2018-03-07 NOTE — PROGRESS NOTES
"Bariatric Medicine Follow Up  Chief Complaint   Patient presents with   • Weight Gain       History of Present Illness:   Lily Vasquez is a 37 y.o. female who presents for weight management follow-up and to help address co-morbidities related to overweight, including sleeping difficulties, hypertriglyceridemia, vitamin D deficiency.    During the patient's last visit, the following were discussed and recommended:  Weight Goal: 3-5% wt loss each month (pt goal weight is 135 lb)  Diet:Continue low carb/high protein meals and snacks   Use Premier Protein MR for am   Eliminate soda, drink water 64+ oz per day  Physical Activity: Gym with a  as she is doing  Risk level for moderate/vigorous exercise program: Low  New Rx: None  Side Effects: Will review consent if applicable.  Behavior change: Continue following guidelines from book on emotional eating, mindful eating, stress management  Follow-up: One month  Order lipids and vitamin D level after next visit    The patient feels she is doing really well. She is excited to say she has not needed Pepcid in 1-1/2 months. She is not drinking any soda, only water. Her energy level is vastly improved, does not have that \"sick\" feeling in her stomach that she was having before starting this program. She is not feeling too hungry.    The patient still has a Premier protein in the morning for breakfast, followed by high-protein snacks between meals, and 2 meals per day with very low carb and higher protein foods. She feels this is working well.    She is still not sleeping well, would be willing to consider a sleep evaluation. Is willing to get labs to update lipid and vitamin D levels. Not currently on vitamin D repletion or statin.    Exercise:   Gym 2 days per week, with cardio session, think she burns about 3-400 darrian per session. Often goes with her siblings.  Also has standing desk, which she uses about 4 hours per day, walks at work     Review of Systems " "  Still wakes up a lot at night. Energy level great. Denies constipation.  All other ROS were reviewed and are otherwise unchanged from my previous visit with patient.    Physical Exam:    /64   Pulse 80   Temp 36.8 °C (98.2 °F)   Ht 1.575 m (5' 2\")   Wt 80 kg (176 lb 6.4 oz)   LMP 12/27/2017   SpO2 99%   BMI 32.26 kg/m²   Waist: 39.5 in  Body fat % 43.2  REE 1496 kcal/day    Weight change since last visit: -7.7 lb (-12.4 lb total)  Waist Circum change since last visit: -0.5 in (-2 in total)    Constitutional: Oriented to person, place, and time and well-developed, well-nourished, and in no distress.    Head: Normocephalic.   Musculoskeletal: Normal range of motion. No edema.   Neurological: Alert and oriented to person, place, and time. No muscle weakness.  Gait normal.   Skin: Warm and dry. Not diaphoretic.   Psychiatric: Mood, memory, affect and judgment normal.     Laboratory:   None new.      Dietitian Assessment: I have reviewed the Dietitian's assessment related to this encounter.       ASSESSMENT/PLAN:  Body mass index is 32.26 kg/m².    Obesity Stage ([place]):  1; Class 1    1. Weight gain, abnormal  REFERRAL TO PULMONOLOGY    LIPID PROFILE   2. Sleeping difficulty  REFERRAL TO PULMONOLOGY   3. Hypertriglyceridemia  LIPID PROFILE   4. Obesity (BMI 30.0-34.9)  REFERRAL TO PULMONOLOGY   5. Vitamin D deficiency  VITAMIN D 25-HYDROXY   6. Gastroesophageal reflux disease without esophagitis       The patient has met her weight loss goal since her last visit one month ago. She continues to reduce her carbohydrates, increase her protein. GERD symptoms essentially resolved. Will update lipid and vitamin D levels, refer patient for sleep evaluation.    The patient and I have discussed at length and agree to the following recommendations, which are all addressing the above diagnoses:    Weight Goal: 3-5% wt loss each month (pt goal weight is 135 lb)  Diet: Premier protein for " breakfast  High-protein/low carb snacks between meals and lunch and dinner, as she is doing  Continue to eliminate sodas, drinking only water  Physical Activity: Gym 2 days per week, discuss increasing to 3 days per week at next visit  Continue standing desk 4 hours per day, walking at work  Risk level for moderate/vigorous exercise program: Low  New Rx: None  Side Effects: Will review consent if applicable.  Behavior change: Continue stimulus narrowing, mindful eating  Follow-up: One month

## 2018-03-07 NOTE — PROGRESS NOTES
Subjective:     Chief Complaint   Patient presents with   • Pharyngitis       Lily Vasquez is a 37 y.o. female here today for evaluation and management of:    Pharyngitis  Patient complains of a sore throat since Sunday that seems to be worsening. She denies any fever or chills. She is little bit of nasal congestion today. She denies any cough. No body aches. She does have a little bit of a headache today. She did have a tonsillectomy as a child.       Allergies   Allergen Reactions   • Codeine Vomiting     abd pain   • Morphine Vomiting   • Sulfa Drugs Vomiting     abd pain   • Tape Contact Dermatitis       Current medicines (including changes today)  Current Outpatient Prescriptions   Medication Sig Dispense Refill   • calcium carbonate (TUMS) 500 MG Chew Tab Take 500 mg by mouth every day.     • bismuth subsalicylate (PEPTO-BISMOL) 262 MG Chew Tab Take 524 mg by mouth 4 Times a Day,Before Meals and at Bedtime.     • dicyclomine (BENTYL) 10 MG Cap Take 1 Cap by mouth 4 Times a Day,Before Meals and at Bedtime. 30 Cap 0   • famotidine (PEPCID) 20 MG Tab Take 20 mg by mouth 2 times a day.     • ibuprofen (MOTRIN) 200 MG Tab Take 200 mg by mouth every 6 hours as needed.       No current facility-administered medications for this visit.        She  has a past medical history of Anesthesia; Arthritis; Dental disorder; Fatty liver; GERD (gastroesophageal reflux disease); Gestational diabetes; Hemorrhoids; Hiatus hernia syndrome; Indigestion; Neck pain; and Vitamin D deficiency. She also has no past medical history of CAD (coronary artery disease); COPD; Liver disease; Psychiatric disorder; or Seizure disorder ([facility]).    Patient Active Problem List    Diagnosis Date Noted   • Pharyngitis 03/07/2018   • Weight gain, abnormal 01/24/2018   • Sleeping difficulty 01/24/2018   • Hiatal hernia 01/03/2018   • Obesity (BMI 30.0-34.9) 01/03/2018   • PCOS (polycystic ovarian syndrome) 01/03/2018   • Hypertriglyceridemia  "01/03/2018   • Labor and delivery, indication for care 06/07/2014   • TMJ (temporomandibular joint syndrome)    • Vitamin D deficiency 12/07/2010   • Preventative health care 12/07/2010   • Fatty liver    • GERD (gastroesophageal reflux disease) 09/09/2009   • Hemorrhoid 09/09/2009       ROS   No fever or chills.  No nausea or vomiting.  No chest pain or palpitations.  No cough or SOB.  No pain with urination or hematuria.  No black or bloody stools.       Objective:     Blood pressure 114/78, pulse 80, temperature 36.9 °C (98.5 °F), resp. rate 16, height 1.575 m (5' 2\"), weight 79.8 kg (176 lb), last menstrual period 12/27/2017, SpO2 97 %. Body mass index is 32.19 kg/m².   Physical Exam:  Well developed, well nourished.  Alert, oriented in no acute distress.  Eye contact is good, speech goal directed, affect calm  Eyes: conjunctiva non-injected, sclera non-icteric.  Ears: Pinna normal. TM pearly gray.   Nose: Nares are patent.  Normal mucosa  Mouth: Oral mucous membranes pink and moist with no lesions.  Neck Supple.  No adenopathy or masses in the neck or supraclavicular regions. No thyromegaly  Lungs: clear to auscultation bilaterally with good excursion. No wheezes or rhonchi  CV: regular rate and rhythm. No murmur          Assessment and Plan:   The following treatment plan was discussed    1. Pharyngitis, unspecified etiology  Discussed viral nature of the illness. Increase fluids and rest. Culture pending. Call if not improving.  - POCT Rapid Strep A  - CULTURE THROAT    Any change or worsening of signs or symptoms, patient encouraged to follow-up or report to the emergency room for further evaluation. Patient understands and agrees.    Followup: Return if symptoms worsen or fail to improve.           "

## 2018-03-07 NOTE — PATIENT INSTRUCTIONS
Pharyngitis  Pharyngitis is redness, pain, and swelling (inflammation) of your pharynx.  What are the causes?  Pharyngitis is usually caused by infection. Most of the time, these infections are from viruses (viral) and are part of a cold. However, sometimes pharyngitis is caused by bacteria (bacterial). Pharyngitis can also be caused by allergies. Viral pharyngitis may be spread from person to person by coughing, sneezing, and personal items or utensils (cups, forks, spoons, toothbrushes). Bacterial pharyngitis may be spread from person to person by more intimate contact, such as kissing.  What are the signs or symptoms?  Symptoms of pharyngitis include:  · Sore throat.  · Tiredness (fatigue).  · Low-grade fever.  · Headache.  · Joint pain and muscle aches.  · Skin rashes.  · Swollen lymph nodes.  · Plaque-like film on throat or tonsils (often seen with bacterial pharyngitis).  How is this diagnosed?  Your health care provider will ask you questions about your illness and your symptoms. Your medical history, along with a physical exam, is often all that is needed to diagnose pharyngitis. Sometimes, a rapid strep test is done. Other lab tests may also be done, depending on the suspected cause.  How is this treated?  Viral pharyngitis will usually get better in 3-4 days without the use of medicine. Bacterial pharyngitis is treated with medicines that kill germs (antibiotics).  Follow these instructions at home:  · Drink enough water and fluids to keep your urine clear or pale yellow.  · Only take over-the-counter or prescription medicines as directed by your health care provider:  ¨ If you are prescribed antibiotics, make sure you finish them even if you start to feel better.  ¨ Do not take aspirin.  · Get lots of rest.  · Gargle with 8 oz of salt water (½ tsp of salt per 1 qt of water) as often as every 1-2 hours to soothe your throat.  · Throat lozenges (if you are not at risk for choking) or sprays may be used to  soothe your throat.  Contact a health care provider if:  · You have large, tender lumps in your neck.  · You have a rash.  · You cough up green, yellow-brown, or bloody spit.  Get help right away if:  · Your neck becomes stiff.  · You drool or are unable to swallow liquids.  · You vomit or are unable to keep medicines or liquids down.  · You have severe pain that does not go away with the use of recommended medicines.  · You have trouble breathing (not caused by a stuffy nose).  This information is not intended to replace advice given to you by your health care provider. Make sure you discuss any questions you have with your health care provider.  Document Released: 12/18/2006 Document Revised: 05/25/2017 Document Reviewed: 08/25/2014  [employer] Interactive Patient Education © 2017 [employer]

## 2018-03-07 NOTE — ASSESSMENT & PLAN NOTE
Patient complains of a sore throat since Sunday that seems to be worsening. She denies any fever or chills. She is little bit of nasal congestion today. She denies any cough. No body aches. She does have a little bit of a headache today. She did have a tonsillectomy as a child.

## 2018-03-08 LAB
AMBIGUOUS DTTM AMBI4: NORMAL
SIGNIFICANT IND 70042: NORMAL
SITE SITE: NORMAL
SOURCE SOURCE: NORMAL

## 2018-03-10 LAB
BACTERIA SPEC RESP CULT: NORMAL
SIGNIFICANT IND 70042: NORMAL
SITE SITE: NORMAL
SOURCE SOURCE: NORMAL

## 2018-03-24 ENCOUNTER — HOSPITAL ENCOUNTER (OUTPATIENT)
Dept: LAB | Facility: MEDICAL CENTER | Age: 38
End: 2018-03-24
Attending: INTERNAL MEDICINE
Payer: COMMERCIAL

## 2018-03-24 DIAGNOSIS — R63.5 WEIGHT GAIN, ABNORMAL: ICD-10-CM

## 2018-03-24 DIAGNOSIS — E78.1 HYPERTRIGLYCERIDEMIA: ICD-10-CM

## 2018-03-24 LAB
CHOLEST SERPL-MCNC: 130 MG/DL (ref 100–199)
HDLC SERPL-MCNC: 40 MG/DL
LDLC SERPL CALC-MCNC: 67 MG/DL
TRIGL SERPL-MCNC: 113 MG/DL (ref 0–149)

## 2018-03-24 PROCEDURE — 80061 LIPID PANEL: CPT

## 2018-03-24 PROCEDURE — 36415 COLL VENOUS BLD VENIPUNCTURE: CPT

## 2018-04-11 ENCOUNTER — OFFICE VISIT (OUTPATIENT)
Dept: HEALTH INFORMATION MANAGEMENT | Facility: MEDICAL CENTER | Age: 38
End: 2018-04-11
Payer: COMMERCIAL

## 2018-04-11 VITALS
HEIGHT: 62 IN | HEART RATE: 80 BPM | BODY MASS INDEX: 31.36 KG/M2 | DIASTOLIC BLOOD PRESSURE: 64 MMHG | OXYGEN SATURATION: 99 % | WEIGHT: 170.4 LBS | SYSTOLIC BLOOD PRESSURE: 108 MMHG

## 2018-04-11 DIAGNOSIS — R63.5 WEIGHT GAIN, ABNORMAL: ICD-10-CM

## 2018-04-11 DIAGNOSIS — K21.9 GASTROESOPHAGEAL REFLUX DISEASE WITHOUT ESOPHAGITIS: ICD-10-CM

## 2018-04-11 DIAGNOSIS — G47.9 SLEEPING DIFFICULTY: ICD-10-CM

## 2018-04-11 DIAGNOSIS — E66.9 OBESITY (BMI 30.0-34.9): ICD-10-CM

## 2018-04-11 DIAGNOSIS — K76.0 FATTY LIVER: ICD-10-CM

## 2018-04-11 DIAGNOSIS — E78.1 HYPERTRIGLYCERIDEMIA: ICD-10-CM

## 2018-04-11 PROCEDURE — 99213 OFFICE O/P EST LOW 20 MIN: CPT | Performed by: INTERNAL MEDICINE

## 2018-04-11 PROCEDURE — 97803 MED NUTRITION INDIV SUBSEQ: CPT | Performed by: DIETITIAN, REGISTERED

## 2018-04-11 NOTE — PROGRESS NOTES
"Bariatric Medicine Follow Up  Chief Complaint   Patient presents with   • Weight Gain       History of Present Illness:   Lily Vasquez is a 37 y.o. female who presents for weight management follow-up and to help address co-morbidities related to overweight, including GERD, insomnia, hypertriglyceridemia.    During the patient's last visit, the following were discussed and recommended:  Weight Goal: 3-5% wt loss each month (pt goal weight is 135 lb)  Diet: Premier protein for breakfast  High-protein/low carb snacks between meals and lunch and dinner, as she is doing  Continue to eliminate sodas, drinking only water  Physical Activity: Gym 2 days per week, discuss increasing to 3 days per week at next visit  Continue standing desk 4 hours per day, walking at work  Risk level for moderate/vigorous exercise program: Low  New Rx: None  Side Effects: Will review consent if applicable.  Behavior change: Continue stimulus narrowing, mindful eating  Follow-up: One month     The patient is really doing well. She has lost a total of 18 pounds since starting the program. She feels very satisfied, not feeling hungry. Is eating 3 meals a day and snacks, no soda. She is drinking mostly water. Her GERD has much improved, not requiring Pepto-Bismol at all. Had been using it daily. Rarely using Tums. Has been off Pepcid for 2 months. Her sleep is still \"terrible\", has a Pulmonology visit scheduled. She was hoping that cutting soda intake would help, but still unable to sleep.    Having a Premier protein for breakfast, high-protein snacks and lunch and dinner. Her  is very supportive, has her whole family eating better and healthier food.    Recent lipids normal.    Seems much less stressed, appears better able to manage her stress and has much less emotional eating.      Exercise:   Stands at her desk, does sit a lot at work, trying to be more active and sit a lot less at home. Goes to the gym 3 days per week, walks during " "her work breaks.     Review of Systems   Insomnia persists. Denies GERD, change in bowel habits.  All other ROS were reviewed and are otherwise unchanged from my previous visit with patient.    Physical Exam:    /64   Pulse 80   Ht 1.575 m (5' 2\")   Wt 77.3 kg (170 lb 6.4 oz)   LMP 12/27/2017   SpO2 99%   BMI 31.17 kg/m²    Waist: 38 in  Body fat % 41  REE 1474 kcal/day    Weight change since last visit: -6 lb (-18.4 lb total)  Waist Circum change since last visit: -1.5 in (-3.5 in total)    Constitutional: Oriented to person, place, and time and well-developed, well-nourished, and in no distress.    Head: Normocephalic.   Cardiovascular: Normal rate and regular rhythm.  No murmur heard.  Pulmonary/Chest: Effort normal and breath sounds normal. No wheezes.   Abdominal: Soft. Bowel sounds are normal.   Musculoskeletal: Normal range of motion. No edema.   Neurological: Alert and oriented to person, place, and time. No muscle weakness.  Gait normal.   Skin: Warm and dry. Not diaphoretic.   Psychiatric: Mood, memory, affect and judgment normal.     Laboratory:   Recent labs reviewed. Lipid profile normal.      Dietitian Assessment: I have reviewed the Dietitian's assessment related to this encounter.       ASSESSMENT/PLAN:  Body mass index is 31.17 kg/m².    Obesity Stage ([place]):  1; Class 1    1. Weight gain, abnormal     2. Obesity (BMI 30.0-34.9)     3. Hypertriglyceridemia     4. Sleeping difficulty     5. Gastroesophageal reflux disease without esophagitis       The patient's weight gain has continued to reverse has lost over 10% of her starting weight thus far. She is responding very well to low carb/high-protein snacks and meals. Her lipids have normalized, GERD resolved. Still awaiting sleep evaluation.    The patient and I have discussed at length and agree to the following recommendations, which are all addressing the above diagnoses:    Weight Goal: 3-5% wt loss each month (pt goal weight is " 135 lb)  Meeting her weight loss goals thus far.  Diet: Continue Premier protein meal replacement for breakfast  Continue high protein/low carb lunch and dinner meals and snacks between meals  Physical Activity: Walking at work, standing at desk  Gym 3 days per week  Risk level for moderate/vigorous exercise program: Low  New Rx: None  Side Effects: Will review consent if applicable.  Behavior change: Continue mindful eating  Follow-up: One month

## 2018-04-11 NOTE — PROGRESS NOTES
"Nutrition Reassess: Medical Weight Management   Today's date: 4/11/2018  Referring Provider: Katie Moffett M.D.  Time: in/out 10:08-10:18am    Subjective:  - ready to eat healthy for the rest of her life, \"there is no reason to go back to feeling as poorly as I did\"  - first time she has been able to wear her wedding ring in a very long time  - going to the gym 3 days a week   - sometimes bored with protein option (doesn't like fish,  cant have shrimp)    Diet history:   Breakfast - premier protein  Snack - celery + PB  Lunch - salad with fruit, seeds, protein  Snack - veggies + chickpeas, nuts or cheese  Dinner - protein + veggie, occ sweet potatoes or regular    Anthropometrics/Objective  Today's weight:    Vitals:    04/11/18 0939   BP: 108/64   Weight: 77.3 kg (170 lb 6.4 oz)   Height: 1.575 m (5' 2\")     BMI:  Body mass index is 31.17 kg/m².  Starting weight/date: 188.8 lbs (1/24/18)  Total weight change : - 18.4 lbs          Meal Plan:   LCD with 1 meal replacements per day (premier protein) per day + 2 snacks    ReAssesment/Notes:  Lily is doing great and is happy to eat this way for the rest of her life because she feels so good. I am very proud of the changes she has made. She is in the right mindset to continue a healthy lifestyle long-term. She had a tough week on vacation last week, but again chose to get right back on track when she returned. Her ability to leave the previous week in the past and not letting it affect her following week is an important tool she is learning that will be beneficial in the weight maintenance phase. She met her goal of going to the gym 3 days a week which she feels is sustainable for her and she wishes she could go longer. We will continue with her current plan.     Follow-up: 1 month    "

## 2018-05-10 ENCOUNTER — OFFICE VISIT (OUTPATIENT)
Dept: HEALTH INFORMATION MANAGEMENT | Facility: MEDICAL CENTER | Age: 38
End: 2018-05-10
Payer: COMMERCIAL

## 2018-05-10 VITALS
OXYGEN SATURATION: 99 % | HEIGHT: 62 IN | DIASTOLIC BLOOD PRESSURE: 60 MMHG | SYSTOLIC BLOOD PRESSURE: 106 MMHG | HEART RATE: 66 BPM | BODY MASS INDEX: 31.12 KG/M2 | WEIGHT: 169.1 LBS

## 2018-05-10 DIAGNOSIS — G47.9 SLEEPING DIFFICULTY: ICD-10-CM

## 2018-05-10 DIAGNOSIS — E55.9 VITAMIN D DEFICIENCY: ICD-10-CM

## 2018-05-10 DIAGNOSIS — E66.9 OBESITY (BMI 30.0-34.9): ICD-10-CM

## 2018-05-10 DIAGNOSIS — K76.0 FATTY LIVER: ICD-10-CM

## 2018-05-10 DIAGNOSIS — R63.5 WEIGHT GAIN, ABNORMAL: ICD-10-CM

## 2018-05-10 PROCEDURE — 99213 OFFICE O/P EST LOW 20 MIN: CPT | Performed by: INTERNAL MEDICINE

## 2018-05-10 PROCEDURE — 97803 MED NUTRITION INDIV SUBSEQ: CPT | Performed by: DIETITIAN, REGISTERED

## 2018-05-10 NOTE — PROGRESS NOTES
"Bariatric Medicine Follow Up  Chief Complaint   Patient presents with   • Weight Gain       History of Present Illness:   Lily Vasquez is a 38 y.o. female who presents for weight management follow-up and to help address co-morbidities related to overweight, including insomnia, vitamin D deficiency.    During the patient's last visit, the following were discussed and recommended:  Weight Goal: 3-5% wt loss each month (pt goal weight is 135 lb)  Meeting her weight loss goals thus far.  Diet: Continue Premier protein meal replacement for breakfast  Continue high protein/low carb lunch and dinner meals and snacks between meals  Physical Activity: Walking at work, standing at desk  Gym 3 days per week  Risk level for moderate/vigorous exercise program: Low  New Rx: None  Side Effects: Will review consent if applicable.  Behavior change: Continue mindful eating  Follow-up: One month    The patient is struggling with emotional eating.  She is still using a Premier protein shake for breakfast, but feels she eats a lot more in the afternoon.  Her mother passed away, was hit by a car, will be buried soon in [place] so the patient is thinking a lot about that and dealing with legal issues.  Also stressed at work, trying to relax by meditating more.    Is interested in behavioral health counseling for dealing with stress.    Not currently on vitamin D repletion.  Still having a lot of trouble sleeping.    Exercise:   Uses a standing desk at work, walks around at work when she can, tries to go to the gym 3 days per week but not always getting to the gym.     Review of Systems   Insomnia.  Denies lightheadedness, change in bowel movements.  All other ROS were reviewed and are otherwise unchanged from my previous visit with patient.    Physical Exam:    /60   Pulse 66   Ht 1.575 m (5' 2\")   Wt 76.7 kg (169 lb 1.6 oz)   LMP 12/27/2017   SpO2 99%   BMI 30.93 kg/m²   Waist: 38 in  Body fat % 40.5  REE 1468 " kcal/day    Weight change since last visit: -1 lb (-20 lb total)  Waist Circum change since last visit:  0 in (-3.5 in total)    Constitutional: Oriented to person, place, and time and well-developed, well-nourished, and in no distress.    Head: Normocephalic.   Musculoskeletal: Normal range of motion. No edema.   Neurological: Alert and oriented to person, place, and time. No muscle weakness.  Gait normal.   Skin: Warm and dry. Not diaphoretic.   Psychiatric: Mood, memory, affect and judgment normal.     Laboratory:   None new.      Dietitian Assessment: I have reviewed the Dietitian's assessment related to this encounter.       ASSESSMENT/PLAN:  Body mass index is 30.93 kg/m².    Obesity Stage ([place]): 1; Class 1    1. Weight gain, abnormal  REFERRAL TO BEHAVIORAL HEALTH   2. Obesity (BMI 30.0-34.9)  REFERRAL TO BEHAVIORAL HEALTH   3. Sleeping difficulty  REFERRAL TO BEHAVIORAL HEALTH   4. Vitamin D deficiency     5. Fatty liver       The patient overall has made great progress, some difficulty lately with stress.  Working on stress management.  Continue to monitor sleep patterns, LFTs, and vitamin D level.    The patient and I have discussed at length and agree to the following recommendations, which are all addressing the above diagnoses:    Weight Goal: 3-5% wt loss each month (pt goal weight is 135 lb)  Has met her weight loss goal this month.  Diet: Continue Premier protein for breakfast  High-protein/low-carb lunch, dinner, snacks in between meals as previously discussed  Resume tracking intake  Physical Activity: Walking at work, increase for distraction, standing at desk  Add resistant exercises at home with weights 2 days weekly  Risk level for moderate/vigorous exercise program: Low  New Rx: None  Side Effects: Will review consent if applicable.  Behavior change: Mindful eating  Follow-up: One month

## 2018-05-11 NOTE — PROGRESS NOTES
"Nutrition Reassess: Medical Weight Management   Today's date: 5/11/2018  Referring Provider: Katie Moffett M.D.      Time: in/out 1504 - 1520    Subjective:  -States she has had increased stress recently and is struggling with stress eating  -Trying to stay busy or do some exercise to stop the stress eating, which helps when she does it  -Attending the gym on three days each week     Anthropometrics/Objective  Today's weight:    Vitals:    05/10/18 1429   BP: 106/60   Weight: 76.7 kg (169 lb 1.6 oz)   Height: 1.575 m (5' 2\")     BMI:  Body mass index is 30.93 kg/m².  Starting weight/date 188.8#     Total weight change : - 19.7#            Meal Plan:   LCD with 1-2 meal replacements per day     ReAssesment/Notes:    Lily was feeling bad that she did not lose much weight so we reviewed her stats and she saw that she actually increased her muscle mass by .1 kg and is better hydrated so she lost fat while increasing her fat-free mass.  This helped to calm her down and she understands better now that the number on the scale is just one thing to look at and we have to take everything into consideration when looking at weight.    I want Lily to track her food intake again because that seemed to help her and she liked it because it kept her on track with her eating.  I love that she has the awareness to try to stay busy or exercise when she recognizes that she wants to stress eat, that will be quite helpful to her in the future for weight maintenance.      She is only doing cardio exercise at the gym so we discussed adding some resistance exercises at home with some weight she has and with body weight exercises.  Her goal is to do these on two days each week and we can increase that amount at next appointment.    Follow-up: 1 month    "

## 2018-05-23 ENCOUNTER — SLEEP CENTER VISIT (OUTPATIENT)
Dept: SLEEP MEDICINE | Facility: MEDICAL CENTER | Age: 38
End: 2018-05-23
Payer: COMMERCIAL

## 2018-05-23 VITALS
SYSTOLIC BLOOD PRESSURE: 118 MMHG | HEART RATE: 79 BPM | DIASTOLIC BLOOD PRESSURE: 78 MMHG | BODY MASS INDEX: 30.91 KG/M2 | WEIGHT: 168 LBS | RESPIRATION RATE: 15 BRPM | HEIGHT: 62 IN | OXYGEN SATURATION: 96 %

## 2018-05-23 DIAGNOSIS — E83.10 IRON METABOLISM DISEASE: ICD-10-CM

## 2018-05-23 DIAGNOSIS — E66.9 OBESITY (BMI 30.0-34.9): ICD-10-CM

## 2018-05-23 DIAGNOSIS — G25.81 RLS (RESTLESS LEGS SYNDROME): ICD-10-CM

## 2018-05-23 DIAGNOSIS — G47.30 SLEEP DISORDER BREATHING: ICD-10-CM

## 2018-05-23 DIAGNOSIS — F51.04 CHRONIC INSOMNIA: ICD-10-CM

## 2018-05-23 PROCEDURE — 99244 OFF/OP CNSLTJ NEW/EST MOD 40: CPT | Performed by: FAMILY MEDICINE

## 2018-05-23 NOTE — PROGRESS NOTES
"     [facility]  Consult Note     Date: 5/23/2018 / Time: 1:27 PM    Patient ID:   Name:             Lily Vasquez   YOB: 1980  Age:                 38 y.o.  female   MRN:               [id number]      Thank you for requesting a sleep medicine consultation on Lily Vasquez at the sleep center. She presents today with the chief complaints of snoring and occasional excessive daytime sleepiness and morning HA. She is referred by Dr. Varela for evaluation and treatment of sleep disorder breathing .     HISTORY OF PRESENT ILLNESS:       At night,  Lily Vasquez goes to bed around 10-11 pm on weekdays and on weekends. She gets out of bed at 5:10 am on weekdays and at 7 am on weekends.  She  averages 6-7 hrs of sleep on a good night and 3-4 hrs on a bad night. Pt has bad nights 5 nights per week. She falls asleep within 15-30 minutes. She awakens 2-3 times a night due to no obvious reason. It takes her 30-60 min to fall back asleep. During that time She lies in bed.  She  does use the bed only for sleeping. Also, during that time  She  thinks about not getting enough sleep, play games on the phone.     Pedro is aware of snoring/witnessed apneas/gasping or choking in sleep.  She  Has symptoms of restless legs syndrome such as an \"urge to move\"  She  legs in the evening or bedtime. It starts around the bed time where she has difficulty falling sleep but denies issues with sleep maintenance. As per pt it has been going on \" all her life\". She was anemic during her pregnancies. Her mom had RLS as well. Currently not on any medication.  She  denies any symptoms of narcolepsy such as sleep paralysis or cataplexy, or any symptoms to suggest parasomnias such as sleep walking or acting out of dreams. She  has not used any medications for her sleep problem.    Overall,  She doesnot finds her sleep refreshing. When She  wakes up in the morning, She does feel tired. In terms of  excessive daytime " sleepiness,  She complains of sleepiness while  at work, while reading or watching TV. [place] sleepiness scale score is abnormal at  10/24.   She does not take regular naps.  REVIEW OF SYSTEMS:       Constitutional: Denies fevers, Denies weight changes  Eyes: Denies changes in vision, no eye pain  Ears/Nose/Throat/Mouth: Denies nasal congestion or sore throat   Cardiovascular: Denies chest pain or palpitations   Respiratory: Denies shortness of breath , Denies cough  Gastrointestinal/Hepatic: Denies abdominal pain, nausea, vomiting, diarrhea, constipation or GI bleeding   Genitourinary: Denies bladder dysfunction, dysuria or frequency  Musculoskeletal/Rheum: Denies  joint pain and swelling   Skin/Breast: Denies rash, denies breast lumps or discharge  Neurological: Denies headache, confusion, memory loss or focal weakness/parasthesias  Psychiatric: denies mood disorder     Comprehensive review of systems form is reviewed with the patient and is attached in the EMR.     PMH:  has a past medical history of Anesthesia; Arthritis; Bronchitis; Chickenpox; Dental disorder; Fatty liver; GERD (gastroesophageal reflux disease); Gestational diabetes; Hemorrhoids; Hiatus hernia syndrome; Indigestion; Influenza; Neck pain; Obesity; and Vitamin D deficiency. She also has no past medical history of CAD (coronary artery disease); COPD; Liver disease; Psychiatric disorder; or Seizure disorder (HCC).  MEDS:   Current Outpatient Prescriptions:   •  Multiple Vitamin (MULTIVITAMINS PO), Take  by mouth., Disp: , Rfl:   •  calcium carbonate (TUMS) 500 MG Chew Tab, Take 500 mg by mouth every day., Disp: , Rfl:   •  ibuprofen (MOTRIN) 200 MG Tab, Take 200 mg by mouth every 6 hours as needed., Disp: , Rfl:   ALLERGIES:   Allergies   Allergen Reactions   • Codeine Vomiting     abd pain   • Morphine Vomiting   • Sulfa Drugs Vomiting     abd pain   • Tape Contact Dermatitis     SURGHX:   Past Surgical History:   Procedure Laterality Date   •  "LISBET BY LAPAROSCOPY  12/18/2012    Performed by Idania Camara M.D. at SURGERY [facility] ORS   • LARYNGOSCOPY  7/6/2011    Performed by EDNA MINA at SURGERY SAME DAY [facility] ORS   • ESOPHAGOSCOPY  7/6/2011    Performed by EDNA MINA at SURGERY SAME DAY [facility] ORS   • TONSILLECTOMY AND ADENOIDECTOMY  1991   • OTHER  1991    TONSILLECTOMY    • CHOLECYSTECTOMY       SOCHX:  reports that she has never smoked. She has never used smokeless tobacco. She reports that she does not drink alcohol or use drugs..   FH:   Family History   Problem Relation Age of Onset   • Arthritis Mother    • Hypertension Mother    • Hyperlipidemia Mother    • Sleep Apnea Mother    • Psychiatry Father    • Alcohol abuse Father    • Thyroid Sister    • Heart Disease Maternal Grandmother    • Heart Disease Maternal Grandfather    • Heart Disease Paternal Grandmother    • Diabetes Paternal Grandmother    • Cancer Paternal Grandfather      brain   • Stroke             Physical Exam:  Vitals/ General Appearance:   Weight/BMI: Body mass index is 30.73 kg/m².  Blood pressure 118/78, pulse 79, resp. rate 15, height 1.575 m (5' 2\"), weight 76.2 kg (168 lb), last menstrual period 12/27/2017, SpO2 96 %.  Vitals:    05/23/18 1309   BP: 118/78   Pulse: 79   Resp: 15   SpO2: 96%   Weight: 76.2 kg (168 lb)   Height: 1.575 m (5' 2\")       Pt. is alert and oriented to time, place and person. Cooperative and in no apparent distress.       1. Head: Atraumatic, normocephalic.   2. Ears: Normal tympanic membrane and no discharge  3. Nose: No inferior turbinate hypertophy,   4. Throat: Oropharynx appears crowded in that the palate is4 overhanging (Malam Lisa scale 4. Tonsils are not enlarged, uvula is  large, pharynx not inflamed. Tongue is  large. has intact dentition and oral hygiene is fair.  5. Neck: Supple. No thyromegaly  6. Chest: Trachea central  7. Lungs auscultation: B/L good air entry, no wheezing   8. Heart auscultation: 1st and 2nd " heart sounds normal, regular rhythm. No appreciable murmur.  9. Abdomen: Soft, non tender, no organomegaly. Bowel sounds present  10. Extremities:  no pedal edema.   P11. Skin: No rash  12. NEUROLOGICAL EXAMINATION: On neurological exam, the patient was alert and oriented x3. speech was clear and fluent without dysarthria.     INVESTIGATIONS:       ASSESSMENT AND PLAN     1. She  has symptoms of Obstructive Sleep Apnea (JAN). She  has excessive daytime sleepiness that interferes with activites of daily living. She  risk factors for JAN include obesity and crowded oropharynx.     The pathophysiology of JAN and the increased risk of cardiovascular morbidity from untreated JAN is discussed in detail with the patient     We have discussed diagnostic options including in-laboratory, attended polysomnography and home sleep testing. We have also discussed treatment options including airway pressurization, reconstructive otolaryngologic surgery, dental appliances and weight management.       Subsequently,treatment options will be discussed based on the diagnostic study. Meanwhile, She is urged to avoid supine sleep, weight gain and alcoholic beverages since all of these can worsen JAN. She is cautioned against drowsy driving. If She feels sleepy while driving, She must pull over for a break/nap, rather than persist on the road, in the interest of She own safety and that of others on the road.    Plan  -  She  will be scheduled for an overnight PSG to assess sleep related  breathing disorder.    2. The evolution of psychophysiological insomnia is discussed in detail. The importance of cognitive restructuring and behavior modification therapy is emphasized for long-term improvement rather than the use of hypnotic agents, which may offer short term relief but may lead to the development of tolerance and side effects with prolonged use. Evening exercise, wind-down before bedtime, and stimulus control (leaving the bed when  unable to fall back asleep at night) are explained.      Plan   -Handouts on stimulus control and sleep hygiene are given and a couple of titles of books on insomnia are recommended,  written by behavioral psychologists.   - reassess after treatment of JAN    3. Restless Leg syndrome: she has hx of RLS and currently  not on medication. It does interfere with sleep onset few times a week however denies issues with sleep maintanance. Denies hx of anemia. RLS could be primary or secondary in nature. RLS can be exacerbated in light of sleep related breathing disorder. Other common cause of RLS is low ferritin level     Plan   - Ferritin ordered today   -  We will reassess RLS symptoms once JAN is treated and well controlled   - Medication options were discussed however deferred on this visit.       4. Body mass index is 30.73 kg/m².        Regarding treatment of other past medical problems and general health maintenance,  She is urged to follow up with PCP.

## 2018-05-23 NOTE — PATIENT INSTRUCTIONS
Sleep hygiene (ref: UpToDate)  ?Sleep as long as necessary to feel rested (usually seven to eight hours for adults) and then get out of bed  ?Maintain a regular sleep schedule, particularly a regular wake-up time in the morning  ?Try not to force sleep  ?Avoid caffeinated beverages after lunch  ?Avoid alcohol near bedtime (eg, late afternoon and evening)  ?Avoid smoking or other nicotine intake, particularly during the evening  ?Adjust the bedroom environment as needed to decrease stimuli (eg, reduce ambient light, turn off the television or radio)  ?Avoid use of light-emitting screens  (laptops, tablets, smartphones, AdexLinkooks) 2 hours before bedtime   ?Resolve concerns or worries before bedtime  ?Exercise regularly for at least 20 minutes, preferably more than four to five hours prior to bedtime.  ?Avoid daytime naps, especially if they are longer than 20 to 30 minutes or occur late in the day    Stimulus control (Ref: UpToDate)    Patients with insomnia may associate their bed and bedroom with the fear of not sleeping or other arousing events, rather than the more pleasurable anticipation of sleep. The longer one stays in bed trying to sleep, the stronger the association becomes. This perpetuates the difficulty falling asleep.Stimulus control therapy is a strategy whose purpose is to disrupt this association by enhancing the likelihood of sleep . Patients should not go to bed until they are sleepy and should use the bed primarily for sleep (and not for reading, watching television, eating, or worrying). They should not spend more than 20 minutes in bed awake. If they are awake after 20 minutes, they should leave the bedroom and engage in a relaxing activity, such as reading or listening to soothing music. Patients should not engage in activities that stimulate them or reward them for being awake in the middle of the night, such as eating or watching television. In addition, they should not return to bed until they  "are tired and feel ready to sleep. If they return to bed and still cannot sleep within 20 minutes, the process should be repeated. An alarm should be set to wake the patient at the same time every morning, including weekends. Daytime naps are not allowed.    BOOKS: \"Say Donna to Insomnia\" by Kishor Dent OR \"No More Sleepless Nights\" by Jaylon Gardner    Sleep Apnea  Sleep apnea is a condition in which breathing pauses or becomes shallow during sleep. Episodes of sleep apnea usually last 10 seconds or longer, and they may occur as many as 20 times an hour. Sleep apnea disrupts your sleep and keeps your body from getting the rest that it needs. This condition can increase your risk of certain health problems, including:  · Heart attack.  · Stroke.  · Obesity.  · Diabetes.  · Heart failure.  · Irregular heartbeat.  There are three kinds of sleep apnea:  · Obstructive sleep apnea. This kind is caused by a blocked or collapsed airway.  · Central sleep apnea. This kind happens when the part of the brain that controls breathing does not send the correct signals to the muscles that control breathing.  · Mixed sleep apnea. This is a combination of obstructive and central sleep apnea.  What are the causes?  The most common cause of this condition is a collapsed or blocked airway. An airway can collapse or become blocked if:  · Your throat muscles are abnormally relaxed.  · Your tongue and tonsils are larger than normal.  · You are overweight.  · Your airway is smaller than normal.  What increases the risk?  This condition is more likely to develop in people who:  · Are overweight.  · Smoke.  · Have a smaller than normal airway.  · Are elderly.  · Are male.  · Drink alcohol.  · Take sedatives or tranquilizers.  · Have a family history of sleep apnea.  What are the signs or symptoms?  Symptoms of this condition include:  · Trouble staying asleep.  · Daytime sleepiness and tiredness.  · Irritability.  · Loud " snoring.  · Morning headaches.  · Trouble concentrating.  · Forgetfulness.  · Decreased interest in sex.  · Unexplained sleepiness.  · Mood swings.  · Personality changes.  · Feelings of depression.  · Waking up often during the night to urinate.  · Dry mouth.  · Sore throat.  How is this diagnosed?  This condition may be diagnosed with:  · A medical history.  · A physical exam.  · A series of tests that are done while you are sleeping (sleep study). These tests are usually done in a sleep lab, but they may also be done at home.  How is this treated?  Treatment for this condition aims to restore normal breathing and to ease symptoms during sleep. It may involve managing health issues that can affect breathing, such as high blood pressure or obesity. Treatment may include:  · Sleeping on your side.  · Using a decongestant if you have nasal congestion.  · Avoiding the use of depressants, including alcohol, sedatives, and narcotics.  · Losing weight if you are overweight.  · Making changes to your diet.  · Quitting smoking.  · Using a device to open your airway while you sleep, such as:  ¨ An oral appliance. This is a custom-made mouthpiece that shifts your lower jaw forward.  ¨ A continuous positive airway pressure (CPAP) device. This device delivers oxygen to your airway through a mask.  ¨ A nasal expiratory positive airway pressure (EPAP) device. This device has valves that you put into each nostril.  ¨ A bi-level positive airway pressure (BPAP) device. This device delivers oxygen to your airway through a mask.  · Surgery if other treatments do not work. During surgery, excess tissue is removed to create a wider airway.  It is important to get treatment for sleep apnea. Without treatment, this condition can lead to:  · High blood pressure.  · Coronary artery disease.  · (Men) An inability to achieve or maintain an erection (impotence).  · Reduced thinking abilities.  Follow these instructions at home:  · Make any  lifestyle changes that your health care provider recommends.  · Eat a healthy, well-balanced diet.  · Take over-the-counter and prescription medicines only as told by your health care provider.  · Avoid using depressants, including alcohol, sedatives, and narcotics.  · Take steps to lose weight if you are overweight.  · If you were given a device to open your airway while you sleep, use it only as told by your health care provider.  · Do not use any tobacco products, such as cigarettes, chewing tobacco, and e-cigarettes. If you need help quitting, ask your health care provider.  · Keep all follow-up visits as told by your health care provider. This is important.  Contact a health care provider if:  · The device that you received to open your airway during sleep is uncomfortable or does not seem to be working.  · Your symptoms do not improve.  · Your symptoms get worse.  Get help right away if:  · You develop chest pain.  · You develop shortness of breath.  · You develop discomfort in your back, arms, or stomach.  · You have trouble speaking.  · You have weakness on one side of your body.  · You have drooping in your face.  These symptoms may represent a serious problem that is an emergency. Do not wait to see if the symptoms will go away. Get medical help right away. Call your local emergency services (911 in the [place] Do not drive yourself to the hospital.   This information is not intended to replace advice given to you by your health care provider. Make sure you discuss any questions you have with your health care provider.  Document Released: 12/08/2003 Document Revised: 08/13/2017 Document Reviewed: 09/26/2016  [employer] Interactive Patient Education © 2017 [employer]    Restless Legs Syndrome  Introduction  Restless legs syndrome is a condition that causes uncomfortable feelings or sensations in the legs, especially while sitting or lying down. The sensations usually cause an overwhelming urge to move the  legs. The arms can also sometimes be affected.  The condition can range from mild to severe. The symptoms often interfere with a person's ability to sleep.  What are the causes?  The cause of this condition is not known.  What increases the risk?  This condition is more likely to develop in:  · People who are older than age 50.  · Pregnant women. In general, restless legs syndrome is more common in women than in men.  · People who have a family history of the condition.  · People who have certain medical conditions, such as iron deficiency, kidney disease, Parkinson disease, or nerve damage.  · People who take certain medicines, such as medicines for high blood pressure, nausea, colds, allergies, depression, and some heart conditions.  What are the signs or symptoms?  The main symptom of this condition is uncomfortable sensations in the legs. These sensations may be:  · Described as pulling, tingling, prickling, throbbing, crawling, or burning.  · Worse while you are sitting or lying down.  · Worse during periods of rest or inactivity.  · Worse at night, often interfering with your sleep.  · Accompanied by a very strong urge to move your legs.  · Temporarily relieved by movement of your legs.  The sensations usually affect both sides of the body. The arms can also be affected, but this is rare. People who have this condition often have tiredness during the day because of their lack of sleep at night.  How is this diagnosed?  This condition may be diagnosed based on your description of the symptoms. You may also have tests, including blood tests, to check for other conditions that may lead to your symptoms. In some cases, you may be asked to spend some time in a sleep lab so your sleeping can be monitored.  How is this treated?  Treatment for this condition is focused on managing the symptoms. Treatment may include:  · Self-help and lifestyle changes.  · Medicines.  Follow these instructions at home:  · Take  medicines only as directed by your health care provider.  · Try these methods to get temporary relief from the uncomfortable sensations:  ¨ Massage your legs.  ¨ Walk or stretch.  ¨ Take a cold or hot bath.  · Practice good sleep habits. For example, go to bed and get up at the same time every day.  · Exercise regularly.  · Practice ways of relaxing, such as yoga or meditation.  · Avoid caffeine and alcohol.  · Do not use any tobacco products, including cigarettes, chewing tobacco, or electronic cigarettes. If you need help quitting, ask your health care provider.  · Keep all follow-up visits as directed by your health care provider. This is important.  Contact a health care provider if:  Your symptoms do not improve with treatment, or they get worse.  This information is not intended to replace advice given to you by your health care provider. Make sure you discuss any questions you have with your health care provider.  Document Released: 12/08/2003 Document Revised: 05/25/2017 Document Reviewed: 12/14/2015  © 2017 [employer]

## 2018-05-23 NOTE — LETTER
"   Valery Pittman M.D.  [facility]   [address]   [facility] [place] [address]  Phone: [phone] - Fax: [phone]           Encounter Date: 5/23/2018  Provider: Valery Pittman M.D.  Location of Care: [facility] SLE[facility]IC[facility]      Patient:   Lily Vasquez   MR Number: [id number]   YOB: 1980     PROGRESS NOTE:       [facility]  Consult Note     Date: 5/23/2018 / Time: 1:27 PM    Patient ID:   Name:             Lily Vasquez   YOB: 1980  Age:                 38 y.o.  female   MRN:               [id number]      Thank you for requesting a sleep medicine consultation on Lily Vasquez at the sleep center. She presents today with the chief complaints of snoring and occasional excessive daytime sleepiness and morning HA. She is referred by Dr. Varela for evaluation and treatment of sleep disorder breathing .     HISTORY OF PRESENT ILLNESS:       At night,  Lily Vasquez goes to bed around 10-11 pm on weekdays and on weekends. She gets out of bed at 5:10 am on weekdays and at 7 am on weekends.  She  averages 6-7 hrs of sleep on a good night and 3-4 hrs on a bad night. Pt has bad nights 5 nights per week. She falls asleep within 15-30 minutes. She awakens 2-3 times a night due to no obvious reason. It takes her 30-60 min to fall back asleep. During that time She lies in bed.  She  does use the bed only for sleeping. Also, during that time  She  thinks about not getting enough sleep, play games on the phone.     Pedro is aware of snoring/witnessed apneas/gasping or choking in sleep.  She  Has symptoms of restless legs syndrome such as an \"urge to move\"  She  legs in the evening or bedtime. It starts around the bed time where she has difficulty falling sleep but denies issues with sleep maintenance. As per pt it has been going on \" all her life\". She was anemic during her pregnancies. Her mom had RLS as well. Currently not on " any medication.  She  denies any symptoms of narcolepsy such as sleep paralysis or cataplexy, or any symptoms to suggest parasomnias such as sleep walking or acting out of dreams. She  has not used any medications for her sleep problem.    Overall,  She doesnot finds her sleep refreshing. When She  wakes up in the morning, She does feel tired. In terms of  excessive daytime sleepiness,  She complains of sleepiness while  at work, while reading or watching TV. Nara Visa sleepiness scale score is abnormal at  10/24.   She does not take regular naps.  REVIEW OF SYSTEMS:       Constitutional: Denies fevers, Denies weight changes  Eyes: Denies changes in vision, no eye pain  Ears/Nose/Throat/Mouth: Denies nasal congestion or sore throat   Cardiovascular: Denies chest pain or palpitations   Respiratory: Denies shortness of breath , Denies cough  Gastrointestinal/Hepatic: Denies abdominal pain, nausea, vomiting, diarrhea, constipation or GI bleeding   Genitourinary: Denies bladder dysfunction, dysuria or frequency  Musculoskeletal/Rheum: Denies  joint pain and swelling   Skin/Breast: Denies rash, denies breast lumps or discharge  Neurological: Denies headache, confusion, memory loss or focal weakness/parasthesias  Psychiatric: denies mood disorder     Comprehensive review of systems form is reviewed with the patient and is attached in the EMR.     PMH:  has a past medical history of Anesthesia; Arthritis; Bronchitis; Chickenpox; Dental disorder; Fatty liver; GERD (gastroesophageal reflux disease); Gestational diabetes; Hemorrhoids; Hiatus hernia syndrome; Indigestion; Influenza; Neck pain; Obesity; and Vitamin D deficiency. She also has no past medical history of CAD (coronary artery disease); COPD; Liver disease; Psychiatric disorder; or Seizure disorder (HCC).  MEDS:   Current Outpatient Prescriptions:   •  Multiple Vitamin (MULTIVITAMINS PO), Take  by mouth., Disp: , Rfl:   •  calcium carbonate (TUMS) 500 MG Chew Tab,  "Take 500 mg by mouth every day., Disp: , Rfl:   •  ibuprofen (MOTRIN) 200 MG Tab, Take 200 mg by mouth every 6 hours as needed., Disp: , Rfl:   ALLERGIES:   Allergies   Allergen Reactions   • Codeine Vomiting     abd pain   • Morphine Vomiting   • Sulfa Drugs Vomiting     abd pain   • Tape Contact Dermatitis     SURGHX:   Past Surgical History:   Procedure Laterality Date   • LISBET BY LAPAROSCOPY  12/18/2012    Performed by Idania Camara M.D. at SURGERY [facility] ORS   • LARYNGOSCOPY  7/6/2011    Performed by EDNA MINA at SURGERY SAME DAY [facility] ORS   • ESOPHAGOSCOPY  7/6/2011    Performed by EDNA MINA at SURGERY SAME DAY [facility] ORS   • TONSILLECTOMY AND ADENOIDECTOMY  1991   • OTHER  1991    TONSILLECTOMY    • CHOLECYSTECTOMY       SOCHX:  reports that she has never smoked. She has never used smokeless tobacco. She reports that she does not drink alcohol or use drugs..   FH:   Family History   Problem Relation Age of Onset   • Arthritis Mother    • Hypertension Mother    • Hyperlipidemia Mother    • Sleep Apnea Mother    • Psychiatry Father    • Alcohol abuse Father    • Thyroid Sister    • Heart Disease Maternal Grandmother    • Heart Disease Maternal Grandfather    • Heart Disease Paternal Grandmother    • Diabetes Paternal Grandmother    • Cancer Paternal Grandfather      brain   • Stroke             Physical Exam:  Vitals/ General Appearance:   Weight/BMI: Body mass index is 30.73 kg/m².  Blood pressure 118/78, pulse 79, resp. rate 15, height 1.575 m (5' 2\"), weight 76.2 kg (168 lb), last menstrual period 12/27/2017, SpO2 96 %.  Vitals:    05/23/18 1309   BP: 118/78   Pulse: 79   Resp: 15   SpO2: 96%   Weight: 76.2 kg (168 lb)   Height: 1.575 m (5' 2\")       Pt. is alert and oriented to time, place and person. Cooperative and in no apparent distress.       1. Head: Atraumatic, normocephalic.   2. Ears: Normal tympanic membrane and no discharge  3. Nose: No inferior turbinate hypertophy,  "   4. Throat: Oropharynx appears crowded in that the palate is4 overhanging (Malam Lisa scale 4. Tonsils are not enlarged, uvula is  large, pharynx not inflamed. Tongue is  large. has intact dentition and oral hygiene is fair.  5. Neck: Supple. No thyromegaly  6. Chest: Trachea central  7. Lungs auscultation: B/L good air entry, no wheezing   8. Heart auscultation: 1st and 2nd heart sounds normal, regular rhythm. No appreciable murmur.  9. Abdomen: Soft, non tender, no organomegaly. Bowel sounds present  10. Extremities:  no pedal edema.   P11. Skin: No rash  12. NEUROLOGICAL EXAMINATION: On neurological exam, the patient was alert and oriented x3. speech was clear and fluent without dysarthria.     INVESTIGATIONS:       ASSESSMENT AND PLAN     1. She  has symptoms of Obstructive Sleep Apnea (JAN). She  has excessive daytime sleepiness that interferes with activites of daily living. She  risk factors for JAN include obesity and crowded oropharynx.     The pathophysiology of JAN and the increased risk of cardiovascular morbidity from untreated JAN is discussed in detail with the patient     We have discussed diagnostic options including in-laboratory, attended polysomnography and home sleep testing. We have also discussed treatment options including airway pressurization, reconstructive otolaryngologic surgery, dental appliances and weight management.       Subsequently,treatment options will be discussed based on the diagnostic study. Meanwhile, She is urged to avoid supine sleep, weight gain and alcoholic beverages since all of these can worsen JAN. She is cautioned against drowsy driving. If She feels sleepy while driving, She must pull over for a break/nap, rather than persist on the road, in the interest of She own safety and that of others on the road.    Plan  -  She  will be scheduled for an overnight PSG to assess sleep related  breathing disorder.    2. The evolution of psychophysiological insomnia is  discussed in detail. The importance of cognitive restructuring and behavior modification therapy is emphasized for long-term improvement rather than the use of hypnotic agents, which may offer short term relief but may lead to the development of tolerance and side effects with prolonged use. Evening exercise, wind-down before bedtime, and stimulus control (leaving the bed when unable to fall back asleep at night) are explained.      Plan   -Handouts on stimulus control and sleep hygiene are given and a couple of titles of books on insomnia are recommended,  written by behavioral psychologists.   - reassess after treatment of JAN    3. Restless Leg syndrome: she has hx of RLS and currently  not on medication. It does interfere with sleep onset few times a week however denies issues with sleep maintanance. Denies hx of anemia. RLS could be primary or secondary in nature. RLS can be exacerbated in light of sleep related breathing disorder. Other common cause of RLS is low ferritin level     Plan   - Ferritin ordered today   -  We will reassess RLS symptoms once JAN is treated and well controlled   - Medication options were discussed however deferred on this visit.       4. Body mass index is 30.73 kg/m².        Regarding treatment of other past medical problems and general health maintenance,  She is urged to follow up with PCP.            Electronically signed by Valery Pittman M.D.  on 05/23/18    Lynn Varela M.D.  [id number]  [address]  [place] [address]  VIA In Basket     Katie Moffett M.D.  [id number]  [address]  [place] [address]  VIA In Basket

## 2018-06-02 ENCOUNTER — HOSPITAL ENCOUNTER (OUTPATIENT)
Dept: LAB | Facility: MEDICAL CENTER | Age: 38
End: 2018-06-02
Attending: FAMILY MEDICINE
Payer: COMMERCIAL

## 2018-06-02 DIAGNOSIS — G25.81 RLS (RESTLESS LEGS SYNDROME): ICD-10-CM

## 2018-06-02 DIAGNOSIS — E83.10 IRON METABOLISM DISEASE: ICD-10-CM

## 2018-06-02 LAB — FERRITIN SERPL-MCNC: 8 NG/ML (ref 10–291)

## 2018-06-02 PROCEDURE — 36415 COLL VENOUS BLD VENIPUNCTURE: CPT

## 2018-06-02 PROCEDURE — 82728 ASSAY OF FERRITIN: CPT

## 2018-06-09 ENCOUNTER — SLEEP STUDY (OUTPATIENT)
Dept: SLEEP MEDICINE | Facility: MEDICAL CENTER | Age: 38
End: 2018-06-09
Attending: FAMILY MEDICINE
Payer: COMMERCIAL

## 2018-06-09 DIAGNOSIS — G47.30 SLEEP DISORDER BREATHING: ICD-10-CM

## 2018-06-09 PROCEDURE — 95811 POLYSOM 6/>YRS CPAP 4/> PARM: CPT | Performed by: INTERNAL MEDICINE

## 2018-06-11 ENCOUNTER — OFFICE VISIT (OUTPATIENT)
Dept: HEALTH INFORMATION MANAGEMENT | Facility: MEDICAL CENTER | Age: 38
End: 2018-06-11
Payer: COMMERCIAL

## 2018-06-11 VITALS
DIASTOLIC BLOOD PRESSURE: 60 MMHG | BODY MASS INDEX: 30.25 KG/M2 | SYSTOLIC BLOOD PRESSURE: 110 MMHG | OXYGEN SATURATION: 97 % | WEIGHT: 164.4 LBS | HEART RATE: 68 BPM | HEIGHT: 62 IN

## 2018-06-11 DIAGNOSIS — R68.89 RECENT CHANGE IN WEIGHT: ICD-10-CM

## 2018-06-11 DIAGNOSIS — E66.9 OBESITY (BMI 30.0-34.9): ICD-10-CM

## 2018-06-11 DIAGNOSIS — E55.9 VITAMIN D DEFICIENCY: ICD-10-CM

## 2018-06-11 DIAGNOSIS — K21.9 GASTROESOPHAGEAL REFLUX DISEASE WITHOUT ESOPHAGITIS: ICD-10-CM

## 2018-06-11 DIAGNOSIS — G47.9 SLEEPING DIFFICULTY: ICD-10-CM

## 2018-06-11 DIAGNOSIS — K76.0 FATTY LIVER: ICD-10-CM

## 2018-06-11 PROCEDURE — 99213 OFFICE O/P EST LOW 20 MIN: CPT | Performed by: INTERNAL MEDICINE

## 2018-06-11 PROCEDURE — 97803 MED NUTRITION INDIV SUBSEQ: CPT | Performed by: DIETITIAN, REGISTERED

## 2018-06-11 NOTE — PROGRESS NOTES
"Nutrition Reassess: Medical Weight Management   Today's date: 6/11/2018  Referring Provider: Katie Moffett M.D.      Time: in/out 3:29-3:40pm    Subjective:  - took a 1 1/2 week break from the gym due to back pain, but has since restarted  - driving out east for family event, planning to pack a healthy cooler  - planning to use [employer] Fit yuli, possibly bring weights, and hike/walk during her trip (family is very supportive and health conscientious)   - sometimes forgetting to grab a snack and will resort to drive thru for fries  - no significant stress eating recently   - used 2 MR on one day, wondering if that's ok    Anthropometrics/Objective  Today's weight:    Vitals:    06/11/18 1437   BP: 110/60   Weight: 74.6 kg (164 lb 6.4 oz)   Height: 1.575 m (5' 2\")     BMI:  Body mass index is 30.07 kg/m².  Starting weight/date 188.8 lbs (1/24/18)   Total weight change : - 24.4 lbs           Meal Plan:   LCD with 1-2 meal replacements per day     ReAssesment/Notes:    Lily is planning a trip with family driving east to spread her mother's ashes. She is going to pack a healthy cooler for her family and stay active during her trip by hiking, walking, and using [employer] Fit yuli. Her family is very health conscientious and she will have her own kitchen to cook meals in.     Gave Lily some options for protein bars to keep handy in her car to help her avoid the drive-thru when she doesn't have time to pack or grab a healthier snack. Hopefully this will also continue to help her break the habit of resorting to fast food. She will continue the gym regularly and using 1-2 MR a day.      Follow-up: 6 weeks     "

## 2018-06-11 NOTE — PROGRESS NOTES
Bariatric Medicine Follow Up  Chief Complaint   Patient presents with   • Weight Gain       History of Present Illness:   Lily Vasquez is a 38 y.o. female who presents for weight management follow-up and to help address co-morbidities related to overweight, including vitamin D deficiency, insomnia, GERD, fatty liver.    During the patient's last visit, the following were discussed and recommended:  Weight Goal: 3-5% wt loss each month (pt goal weight is 135 lb)  Has met her weight loss goal this month.  Diet: Continue Premier protein for breakfast  High-protein/low-carb lunch, dinner, snacks in between meals as previously discussed  Resume tracking intake  Physical Activity: Walking at work, increase for distraction, standing at desk  Add resistant exercises at home with weights 2 days weekly  Risk level for moderate/vigorous exercise program: Low  New Rx: None  Side Effects: Will review consent if applicable.  Behavior change: Mindful eating  Follow-up: One month    Lily continues to lose weight, waist circumference.  She does not feel she is losing in her waist and is surprised she has lost weight.  Had not been exercising in the last week and a half, now has been going back to the gym daily.    Having a Premier protein shake for breakfast.  Feels hungry at 6 AM before breakfast.  Having 2 meals and snacks throughout the day, not feeling hungry after dinner, so not having a snack after dinner.    Has gone for sleep evaluation, polysomnography showed JAN.  A lot less GERD, skips TUMS some nights.  Has not recently been on vitamin D repletion.  No recent LFTs.  Ferritin low by recent testing for restless leg syndrome.    Exercise:   Had back pain, stopped exercising for a week and a half, now restarted and is going to the gym in the afternoons.    Review of Systems   Denies lightheadedness, change in bowel movements.  All other ROS were reviewed and are otherwise unchanged from my previous visit with  "patient.    Physical Exam:    /60   Pulse 68   Ht 1.575 m (5' 2\")   Wt 74.6 kg (164 lb 6.4 oz)   LMP 12/27/2017   SpO2 97%   BMI 30.07 kg/m²   Waist: 37 in  Body fat % 38.5  REE 1452 kcal/day    Weight change since last visit: -5 lb (-24 lb total)  Waist Circum change since last visit: -1 in (-4.5 in total)    Constitutional: Oriented to person, place, and time and well-developed, well-nourished, and in no distress.    Head: Normocephalic.   Cardiovascular: Normal rate and regular rhythm.  No murmur heard.  Pulmonary/Chest: Effort normal and breath sounds normal. No wheezes.   Abdominal: Soft. Bowel sounds are normal.   Musculoskeletal: Normal range of motion. No edema.   Neurological: Alert and oriented to person, place, and time. No muscle weakness.  Gait normal.   Skin: Warm and dry. Not diaphoretic.   Psychiatric: Mood, memory, affect and judgment normal.     Laboratory:   None new.      Dietitian Assessment: I have reviewed the Dietitian's assessment related to this encounter.       ASSESSMENT/PLAN:  Body mass index is 30.07 kg/m².    Obesity Stage ([place]):  2; Class 1    1. Obesity (BMI 30.0-34.9)  BASIC METABOLIC PANEL    HEPATIC FUNCTION PANEL   2. Vitamin D deficiency  VITAMIN D 25-HYDROXY   3. Sleeping difficulty     4. Gastroesophageal reflux disease without esophagitis     5. Recent change in weight     6. Fatty liver  HEPATIC FUNCTION PANEL     The patient continues to lose weight, waist circumference.  Likely has JAN; is being treated by pulmonary/sleep medicine.  Behavioral health pending.  GERD much improved.  Update labs with weight loss.    The patient and I have discussed at length and agree to the following recommendations, which are all addressing the above diagnoses:    Weight Goal: 3-5% wt loss each month (pt goal weight is 135 lb)  Diet: Continue Premier protein for breakfast  High-protein/low-carb lunch, dinner, snacks in between meals as previously discussed  Resume tracking " intake  Physical Activity: Daily walking, standing at desk  We will set more specific parameters at follow-up  Risk level for moderate/vigorous exercise program: Low  New Rx: None  Side Effects: Will review consent if applicable.  Follow-up: 6 weeks    Patient's body mass index is 30.07 kg/m². Exercise and nutrition counseling were performed at this visit.

## 2018-06-11 NOTE — PROCEDURES
CLINICAL COMMENTS:  The patient underwent a split night polysomnogram with a CPAP titration using the standard montage for measurement of parameters of sleep, respiratory events, movement abnormalities, heart rate and rhythm. A microphone was used to monitor snoring.          DIAGNOSTIC:    ANALYSIS: The study was started at 9:55:54 PM.  The diagnostic recording time was 131.5 minutes with a sleep period of 126.2 minutes.  Total sleep time was 123.7 minutes with a sleep efficiency of 94.1%.  The sleep latency was 5.3 minutes, and REM latency was 77.0 minutes.  The patient had 9 arousals in total, for an arousal index of 4.4.      RESPIRATORY: The patient had 0 apneas in total.  Of these, 0 were obstructive apneas, and 0 were central apneas.  This resulted in an apnea index (AI) of 0.0.  The patient had 61 hypopneas in total, which resulted in a hypopnea index of 29.6.  The overall AHI was 29.6, while the AHI during REM was 37.5.  The supine AHI = 50.5.    OXIMETRY: Oxygen saturation monitoring showed a mean SpO2 of 92.1% for the diagnostic part of the study, with a minimum oxygen saturation of 86.0%.  Oxygen saturations were below 89% for 1.1% of sleep time.    CARDIAC: The highest heart rate for the first part of the study was 84.0 beats per minute.  The average heart rate during sleep was 58.8 bpm, while the highest heart rate was 83.0 bpm.    LIMB MOVEMENTS: There were a total of 137 periodic limb movements during sleep, of which 1 were PLMS arousals.  This resulted in a PLMS index of 66.5 and a PLMS arousal index of 0.5.    TREATMENT:    Treatment recording time was 328.6 minutes with a total sleep time of 205.5min.  The patient had an arousal index of 9.3.      RESPIRATORY: The patient had 0 obstructive apneas, 35 central apneas, and 14 hypopneas for an overall AHI was 14.3.    OXIMETRY: The mean SpO2 during treatment was 94.0%, with a minimum oxygen saturation of 84.0%.        Interpretation:    MsShreya Vasquez  presented with snoring and excessive daytime somnolence.  This is a split-night study.    In the diagnostic phase there is reasonable continuity of sleep although only 8 minutes of REM sleep time are included.  There are frequent periodic limb movements but the periodic limb movement with arousal index is only 0.5 events per hour.  The apnea hypopnea index is 29.6 events per hour, consisting exclusively of hypopnea episodes.  The hypopnea is are more likely to occur in supine and REM sleep.  The lowest arterial oxygen saturation is 86% on room air and she spent 21% of the diagnostic time with a saturation below 90%.  The heart rate varies from  bpm.    In the treatment phase of the study there is some fragmentation of sleep but significant REM sleep time is recorded.  The periodic limb movements are less frequent and again do not interfere with sleep continuity.  CPAP was applied at 5-7 cm water pressure.  The higher pressures were used to address some persisting hypopnea events in supine sleep.  Central apnea episodes were noted during the treatment phase of the titration with 35 episodes of central apnea, no obstructive apnea and 14 episodes of hypopnea.  The central apnea episodes seemed to be associated with sleep onset.  BiPAP was briefly used but no supine or REM sleep time was included in that stage of the test.  At a CPAP pressure 7 cm water, the apnea hypopnea index was 8.5 events per hour but was less than 1 event per hour when the central apnea episodes are excluded.  The mean arterial oxygen saturation is 94% on room air with a minimum saturation of 91%.    Assessment:  Severe sleep apnea hypopnea with an apnea hypopnea index of 29.6 events per hour and a lowest arterial oxygen saturation of 86% on room air.  Fragmentation of sleep related to the breathing events and probably to treatment effects.  There is a good response to airway pressurization.  The central apnea episodes seen during the  titration phase probably represent a sleep onset and treatment onset phenomenon but the possibility of complex sleep apnea cannot be excluded.    Recommendations:  CPAP at 7 cm water pressure.  She did best with a small air fit F20 fullface mask.  If her response to treatment is not optimal, follow-up polysomnography dedicated to further titration of therapy and possible use of BiPAP or ASV might be considered

## 2018-06-12 DIAGNOSIS — G47.9 SLEEPING DIFFICULTY: ICD-10-CM

## 2018-06-15 ENCOUNTER — SLEEP CENTER VISIT (OUTPATIENT)
Dept: SLEEP MEDICINE | Facility: MEDICAL CENTER | Age: 38
End: 2018-06-15
Payer: COMMERCIAL

## 2018-06-15 VITALS
BODY MASS INDEX: 30.55 KG/M2 | OXYGEN SATURATION: 97 % | WEIGHT: 166 LBS | RESPIRATION RATE: 14 BRPM | SYSTOLIC BLOOD PRESSURE: 98 MMHG | HEIGHT: 62 IN | DIASTOLIC BLOOD PRESSURE: 58 MMHG | HEART RATE: 80 BPM

## 2018-06-15 DIAGNOSIS — G47.33 OSA (OBSTRUCTIVE SLEEP APNEA): ICD-10-CM

## 2018-06-15 DIAGNOSIS — G25.81 RLS (RESTLESS LEGS SYNDROME): ICD-10-CM

## 2018-06-15 DIAGNOSIS — R79.0 LOW FERRITIN LEVEL: ICD-10-CM

## 2018-06-15 PROCEDURE — 99213 OFFICE O/P EST LOW 20 MIN: CPT | Performed by: NURSE PRACTITIONER

## 2018-06-15 NOTE — PROGRESS NOTES
Chief Complaint   Patient presents with   • Follow-Up     SS results         HPI: This patient is a 38 y.o. female, who presents for sleep study results.   She was seen in consultation for suspected sleep disordered breathing May 23, 2018 with Dr. Pittman.  Please see his note for details.  Chief complaints include loud snoring, occasional EDS, morning headache, difficulty initiating and maintaining sleep.    PSG indicates severe obstructive sleep apnea with an AHI of 29.6, minimum oxygen saturation of 86 %.  She was titrated on CPAP and briefly on BiPAP.  She did well on CPAP 7 cm H2O which resulted in almost 1 hour of REM, AHI was reduced to 8, mean saturation 93%.  She did have some treatment onset centrals.  Reviewed sleep testing with Dr. Lerner, recommend initiating CPAP at a pressure of 7 with close clinical follow-up.    Past Medical History:   Diagnosis Date   • Anesthesia     Severe PONV   • Arthritis     TMJ   • Bronchitis    • Chickenpox    • Dental disorder     permanent retainer   • Fatty liver    • GERD (gastroesophageal reflux disease)    • Gestational diabetes    • Hemorrhoids    • Hiatus hernia syndrome    • Indigestion     GERD   • Influenza    • Neck pain    • Obesity    • Vitamin D deficiency        Social History   Substance Use Topics   • Smoking status: Never Smoker   • Smokeless tobacco: Never Used   • Alcohol use No       Family History   Problem Relation Age of Onset   • Arthritis Mother    • Hypertension Mother    • Hyperlipidemia Mother    • Sleep Apnea Mother    • Psychiatry Father    • Alcohol abuse Father    • Thyroid Sister    • Heart Disease Maternal Grandmother    • Heart Disease Maternal Grandfather    • Heart Disease Paternal Grandmother    • Diabetes Paternal Grandmother    • Cancer Paternal Grandfather      brain   • Stroke         Current medications as of today   Current Outpatient Prescriptions   Medication Sig Dispense Refill   • Multiple Vitamin (MULTIVITAMINS PO) Take  by  "mouth.     • calcium carbonate (TUMS) 500 MG Chew Tab Take 500 mg by mouth every day.     • ibuprofen (MOTRIN) 200 MG Tab Take 200 mg by mouth every 6 hours as needed.       No current facility-administered medications for this visit.        Allergies: Codeine; Morphine; Sulfa drugs; and Tape    Blood pressure (!) 98/58, pulse 80, resp. rate 14, height 1.575 m (5' 2\"), weight 75.3 kg (166 lb), last menstrual period 12/27/2017, SpO2 97 %.      ROS: As per HPI and otherwise negative if not stated.      Physical exam:   Constitutional: Well-nourished, well-developed, in no acute distress  Eyes: PERRL  Mouth/Throat: Oropharynx is moist, clear, no lesions, Mallampati 4  Neck: supple, trachea midline  Respiratory: no intercostal retractions or accessory muscle use   Musculoskeletal: no clubbing or cyanosis  Skin: No rashes or lesions noted on exposed skin  Neuro: No focal deficit noted  Psychiatric: Oriented to time, person and place.     Diagnosis:  1. JAN (obstructive sleep apnea)  DME CPAP   2. Low ferritin level  FERRITIN   3. RLS (restless legs syndrome)         Plan:    I reviewed with the patient the pathophysiology of obstructive sleep apnea, as well as potential cardiac and neurologic risks associated with untreated sleep apnea. We reviewed treatment options including CPAP/BiPAP therapy, ENT referral, dental appliance.  Additionally weight loss, side sleeping and avoidance of alcohol is recommended.  Gven the severity of her sleep apnea CPAP therapy is recommended. patient does have significant narrow airway, Mallampati 4.  We discussed ENT referral to evaluate for UPPP.  She is hesitant to do that at this time but may consider in the future.  I would agree with this.      Patient also reported symptoms of restless leg at her last visit.  Sleep study shows increased total limb movements however these did not result in arousals. ferritin level was checked and is significantly low at 8ng/ml.  Patient reports heavy " menses.  It is recommended that she start OTC iron sulfate 325 mg daily with vitamin C. OTC stool softener for constipation      Initiate CPAP 7 cm H2O, [facility]    She will follow-up in 3 months to review compliance download, sooner if needed    Recheck ferritin levels prior to her next visit

## 2018-06-21 ENCOUNTER — OFFICE VISIT (OUTPATIENT)
Dept: MEDICAL GROUP | Facility: CLINIC | Age: 38
End: 2018-06-21
Payer: COMMERCIAL

## 2018-06-21 ENCOUNTER — HOSPITAL ENCOUNTER (OUTPATIENT)
Facility: MEDICAL CENTER | Age: 38
End: 2018-06-21
Attending: FAMILY MEDICINE
Payer: COMMERCIAL

## 2018-06-21 ENCOUNTER — HOSPITAL ENCOUNTER (OUTPATIENT)
Dept: LAB | Facility: MEDICAL CENTER | Age: 38
End: 2018-06-21
Attending: INTERNAL MEDICINE
Payer: COMMERCIAL

## 2018-06-21 VITALS
RESPIRATION RATE: 14 BRPM | HEIGHT: 62 IN | OXYGEN SATURATION: 97 % | SYSTOLIC BLOOD PRESSURE: 108 MMHG | DIASTOLIC BLOOD PRESSURE: 68 MMHG | HEART RATE: 78 BPM | BODY MASS INDEX: 30 KG/M2 | TEMPERATURE: 98.3 F | WEIGHT: 163 LBS

## 2018-06-21 DIAGNOSIS — J02.9 SORE THROAT: ICD-10-CM

## 2018-06-21 DIAGNOSIS — Z20.818 EXPOSURE TO STREP THROAT: ICD-10-CM

## 2018-06-21 DIAGNOSIS — E66.9 OBESITY (BMI 30.0-34.9): ICD-10-CM

## 2018-06-21 DIAGNOSIS — K76.0 FATTY LIVER: ICD-10-CM

## 2018-06-21 DIAGNOSIS — J06.9 VIRAL URI: ICD-10-CM

## 2018-06-21 LAB
25(OH)D3 SERPL-MCNC: 27 NG/ML (ref 30–100)
ANION GAP SERPL CALC-SCNC: 8 MMOL/L (ref 0–11.9)
BUN SERPL-MCNC: 15 MG/DL (ref 8–22)
CALCIUM SERPL-MCNC: 9.4 MG/DL (ref 8.5–10.5)
CHLORIDE SERPL-SCNC: 106 MMOL/L (ref 96–112)
CO2 SERPL-SCNC: 26 MMOL/L (ref 20–33)
CREAT SERPL-MCNC: 0.79 MG/DL (ref 0.5–1.4)
GLUCOSE SERPL-MCNC: 83 MG/DL (ref 65–99)
INT CON NEG: NEGATIVE
INT CON POS: POSITIVE
POTASSIUM SERPL-SCNC: 4.3 MMOL/L (ref 3.6–5.5)
S PYO AG THROAT QL: NEGATIVE
SODIUM SERPL-SCNC: 140 MMOL/L (ref 135–145)

## 2018-06-21 PROCEDURE — 87880 STREP A ASSAY W/OPTIC: CPT | Performed by: FAMILY MEDICINE

## 2018-06-21 PROCEDURE — 99213 OFFICE O/P EST LOW 20 MIN: CPT | Performed by: FAMILY MEDICINE

## 2018-06-21 PROCEDURE — 36415 COLL VENOUS BLD VENIPUNCTURE: CPT

## 2018-06-21 PROCEDURE — 80048 BASIC METABOLIC PNL TOTAL CA: CPT

## 2018-06-21 PROCEDURE — 87070 CULTURE OTHR SPECIMN AEROBIC: CPT

## 2018-06-21 PROCEDURE — 82306 VITAMIN D 25 HYDROXY: CPT

## 2018-06-21 RX ORDER — AMOXICILLIN 500 MG/1
500 CAPSULE ORAL 2 TIMES DAILY
Qty: 20 CAP | Refills: 0 | Status: SHIPPED | OUTPATIENT
Start: 2018-06-21 | End: 2018-08-31

## 2018-06-21 NOTE — PROGRESS NOTES
CC: Sore throat for 3 days    HPI:    The patient is a 38-year-old white female who comes in today with a complaint of sore throat for the last 3 days although it is a little better this morning.  She says all of her 3 children have been diagnosed with strep and are on amoxicillin.  She denies fever but has had some slight chills.  Today she started getting some head congestion.  Denies chest congestion or cough.  Denies chest pain or shortness of breath.  Denies ear pain or headache.      Patient Active Problem List    Diagnosis Date Noted   • JAN (obstructive sleep apnea) 06/15/2018   • Low ferritin level 06/15/2018   • RLS (restless legs syndrome) 05/23/2018   • Chronic insomnia 05/23/2018   • Pharyngitis 03/07/2018   • Weight gain, abnormal 01/24/2018   • Sleeping difficulty 01/24/2018   • Hiatal hernia 01/03/2018   • Obesity (BMI 30.0-34.9) 01/03/2018   • PCOS (polycystic ovarian syndrome) 01/03/2018   • Hypertriglyceridemia 01/03/2018   • Labor and delivery, indication for care 06/07/2014   • TMJ (temporomandibular joint syndrome)    • Vitamin D deficiency 12/07/2010   • Preventative health care 12/07/2010   • Fatty liver    • GERD (gastroesophageal reflux disease) 09/09/2009   • Hemorrhoid 09/09/2009         Current Outpatient Prescriptions:   •  amoxicillin (AMOXIL) 500 MG Cap, Take 1 Cap by mouth 2 times a day., Disp: 20 Cap, Rfl: 0  •  Multiple Vitamin (MULTIVITAMINS PO), Take  by mouth., Disp: , Rfl:   •  calcium carbonate (TUMS) 500 MG Chew Tab, Take 500 mg by mouth every day., Disp: , Rfl:   •  ibuprofen (MOTRIN) 200 MG Tab, Take 200 mg by mouth every 6 hours as needed., Disp: , Rfl:     Allergies as of 06/21/2018 - Reviewed 06/21/2018   Allergen Reaction Noted   • Codeine Vomiting 01/02/2009   • Morphine Vomiting 06/28/2011   • Sulfa drugs Vomiting 06/28/2011   • Tape Contact Dermatitis 06/28/2011       Social History     Social History   • Marital status:      Spouse name: N/A   • Number of  "children: N/A   • Years of education: N/A     Occupational History   • Not on file.     Social History Main Topics   • Smoking status: Never Smoker   • Smokeless tobacco: Never Used   • Alcohol use No   • Drug use: No   • Sexual activity: Yes     Partners: Male      Comment: , works at [employer]     Other Topics Concern   • Not on file     Social History Narrative   • No narrative on file       Family History   Problem Relation Age of Onset   • Arthritis Mother    • Hypertension Mother    • Hyperlipidemia Mother    • Sleep Apnea Mother    • Psychiatry Father    • Alcohol abuse Father    • Thyroid Sister    • Heart Disease Maternal Grandmother    • Heart Disease Maternal Grandfather    • Heart Disease Paternal Grandmother    • Diabetes Paternal Grandmother    • Cancer Paternal Grandfather      brain   • Stroke         Past Medical History:   Diagnosis Date   • Anesthesia     Severe PONV   • Arthritis     TMJ   • Bronchitis    • Chickenpox    • Dental disorder     permanent retainer   • Fatty liver    • GERD (gastroesophageal reflux disease)    • Gestational diabetes    • Hemorrhoids    • Hiatus hernia syndrome    • Indigestion     GERD   • Influenza    • Neck pain    • Obesity    • Vitamin D deficiency        Past Surgical History:   Procedure Laterality Date   • LISBET BY LAPAROSCOPY  12/18/2012    Performed by Idania Camara M.D. at SURGERY [facility] ORS   • LARYNGOSCOPY  7/6/2011    Performed by EDNA MINA at SURGERY SAME DAY [facility] ORS   • ESOPHAGOSCOPY  7/6/2011    Performed by EDNA MINA at SURGERY SAME DAY [facility] ORS   • TONSILLECTOMY AND ADENOIDECTOMY  1991   • OTHER  1991    TONSILLECTOMY    • CHOLECYSTECTOMY         ROS:  As documented above in the history of present illness.    /68   Pulse 78   Temp 36.8 °C (98.3 °F)   Resp 14   Ht 1.575 m (5' 2\")   Wt 73.9 kg (163 lb)   LMP 06/07/2018   SpO2 97%   Breastfeeding? No   BMI 29.81 kg/m²     Physical Exam:  "     GEN:  Alert, oriented, in no distress  HEENT;  PERRLA, EOMI, TM's clear bilaterally, oropharynx mildly erythematous without erythema or exudates.  NECK;  Supple without adenopathy   LUNGS:  Clear to auscultation  CV:  Heart RRR without murmurs or S3 or S4; peripheral pulses intact.  EXT:  Without cyanosis, clubbing, or edema.  NEURO:  Cranial nerves II through XII intact.  Motor function and sensation intact.    Office Visit on 06/21/2018   Component Date Value Ref Range Status   • Rapid Strep Screen 06/21/2018 Negative   Final   • Internal Control Positive 06/21/2018 Positive   Final   • Internal Control Negative 06/21/2018 Negative   Final         Assessment and Plan:     1. Sore throat with exposure to strep throat  -Most likely viral  - CULTURE THROAT because of close contact exposure to strep.  She is going out of town tomorrow for 10 days.  We will contact her through my chart.  She is given a printed prescription of amoxicillin to be filled only if the throat culture comes back positive.  She expresses understanding of this.    2. Viral URI  -Supportive measures reviewed.              Please note that this dictation was created using voice recognition software. I have worked with consultants from the vendor as well as technical experts from [facility] to optimize the interface. I have made every reasonable attempt to correct obvious errors, but I expect there are errors of ko and possibly content that I did not discover before finalizing the note.

## 2018-07-10 ENCOUNTER — OFFICE VISIT (OUTPATIENT)
Dept: BEHAVIORAL HEALTH | Facility: PHYSICIAN GROUP | Age: 38
End: 2018-07-10
Payer: COMMERCIAL

## 2018-07-10 DIAGNOSIS — Z63.4 BEREAVEMENT: ICD-10-CM

## 2018-07-10 DIAGNOSIS — F50.9 EATING DISORDER: ICD-10-CM

## 2018-07-10 PROCEDURE — 90791 PSYCH DIAGNOSTIC EVALUATION: CPT | Performed by: MARRIAGE & FAMILY THERAPIST

## 2018-07-10 SDOH — SOCIAL STABILITY - SOCIAL INSECURITY: DISSAPEARANCE AND DEATH OF FAMILY MEMBER: Z63.4

## 2018-07-10 NOTE — BH THERAPY
[facility]  INITIAL ASSESSMENT    Name: Lily Vasquez  MRN: [id number]  : 1980  Age: 38 y.o.  Date of assessment: 7/10/2018  PCP: Katie Moffett M.D.  Persons in attendance: Patient  Total session time: 55 minutes      CHIEF COMPLAINT AND HISTORY OF PRESENTING PROBLEM:  (as stated by Patient):  Lily Vasquez is a 38 y.o., White female referred for assessment by Lynn Varela,*.  Primary presenting issue includes   Chief Complaint   Patient presents with   • Eating Disorder     pt said she is an emotional eater and she lost 25 lbs and she is afraid she'll gain it back b/c she is emotional   .     FAMILY/SOCIAL HISTORY  Current living situation/household members: pt lives with her H and their 3 children  Relevant family history/structure/dynamics: mother  accidentally hit by a car last  2017 and she hasn't been able to grieve  Current family/social stressors: pt is the only emotional person in her family of 4 sibs  Quality/quantity of current family and/or social support: limited  Does patient/parent report a family history of behavioral health issues, diagnoses, or treatment? No  Family History   Problem Relation Age of Onset   • Arthritis Mother    • Hypertension Mother    • Hyperlipidemia Mother    • Sleep Apnea Mother    • Psychiatry Father    • Alcohol abuse Father    • Thyroid Sister    • Heart Disease Maternal Grandmother    • Heart Disease Maternal Grandfather    • Heart Disease Paternal Grandmother    • Diabetes Paternal Grandmother    • Cancer Paternal Grandfather      brain   • Stroke          BEHAVIORAL HEALTH TREATMENT HISTORY  Does patient/parent report a history of prior behavioral health treatment for patient? No:    History of untreated behavioral health issues identified? Yes pt is A[facility]A    MEDICAL HISTORY  Primary care behavioral health screenings: Patient Health Questionaire                                     If depressive symptoms identified deferred to follow  up visit unless specifically addressed in assesment and plan.    Interpretation of PHQ-9 Total Score   Score Severity   1-4 No Depression   5-9 Mild Depression   10-14 Moderate Depression   15-19 Moderately Severe Depression   20-27 Severe Depression       Past medical/surgical history:   Past Medical History:   Diagnosis Date   • Anesthesia     Severe PONV   • Arthritis     TMJ   • Bronchitis    • Chickenpox    • Dental disorder     permanent retainer   • Fatty liver    • GERD (gastroesophageal reflux disease)    • Gestational diabetes    • Hemorrhoids    • Hiatus hernia syndrome    • Indigestion     GERD   • Influenza    • Neck pain    • Obesity    • Vitamin D deficiency       Past Surgical History:   Procedure Laterality Date   • LISBET BY LAPAROSCOPY  12/18/2012    Performed by Idania Camara M.D. at SURGERY [facility]   • LARYNGOSCOPY  7/6/2011    Performed by EDNA MINA at SURGERY SAME DAY [facility] ORS   • ESOPHAGOSCOPY  7/6/2011    Performed by EDNA MINA at SURGERY SAME DAY [facility] ORS   • TONSILLECTOMY AND ADENOIDECTOMY  1991   • OTHER  1991    TONSILLECTOMY    • CHOLECYSTECTOMY          Medication Allergies:  Codeine; Morphine; Sulfa drugs; and Tape   Medical history provided by patient during current evaluation: no    Patient reports last physical exam: 2017  Does patient/parent report any history of or current developmental concerns? No  Does patient/parent report nutritional concerns? Yes  Does patient/parent report change in appetite or weight loss/gain? Yespt is in a [facility] weight loss program and she just lost 25 lbs  Does patient/parent report history of eating disorder symptoms? Yes  Does patient/parent report dental problem? No  Does patient/parent report physical pain? No   Indicate if pain is acute or chronic, and location: n/a   Pain scale rating:       Does patient/parent report functional impact of medical, developmental, or pain issues?   no    EDUCATIONAL/LEARNING  HISTORY  Is patient currently enrolled in a school/educational program?   No:   Highest grade level completed: BS degree in anthropology  School performance/functioning: pt had undiagnosed adhd  History of Special Education/repeated grades/learning issues: no  Preferred learning style: all  Current learning needs (large print, language barrier, etc):  none    EMPLOYMENT/RESOURCES  Is the patient currently employed? [employer]  Does the patient/parent report adequate financial resources? Yes  Does patient identify impact of presenting issue on work functioning? Yes  Work or income-related stressors:  Pt is having tears at work     HISTORY:  Does patient report current or past enlistment? No    [If yes, complete below items]  Does patient report history of exposure to combat? No  Does patient report history of  sexual trauma? No  Does patient report other -related stressors? No    SPIRITUAL/CULTURAL/IDENTITY:  What are the patient’s/family’s spiritual beliefs or practices? Pt is spiritual  What is the patient’s cultural or ethnic background/identity? cauc from [place]  How does the patient identify their sexual orientation? hetero  How does the patient identify their gender? female  Does the patient identify any spiritual/cultural/identity factors as relevant to the presenting issue? No    LEGAL HISTORY  Has the patient ever been involved with juvenile, adult, or family legal systems? No   [If yes, trigger section below:]  Does patient report ever being a victim of a crime?  Yesher mother was killed by a hit and run  Does patient report involvement in any current legal issues?  Yes  Does patient report ever being arrested or committing a crime? No  Does patient report any current agency (parole/probation/CPS/) involvement? No    ABUSE/NEGLECT/TRAUMA SCREENING  Does patient report feeling “unsafe” in his/her home, or afraid of anyone? No  Does patient report any history of  physical, sexual, or emotional abuse? Yesemotional  Does parent or significant other report any of the above? No  Is there evidence of neglect by self? No  Is there evidence of neglect by a caregiver? No  Does the patient/parent report any history of CPS/APS/police involvement related to suspected abuse/neglect or domestic violence? No  Does the patient/parent report any other history of potentially traumatic life events? Yesmother was killed by a car in a parking lot  Based on the information provided during the current assessment, is a mandated report of suspected abuse/neglect being made?  No     SAFETY ASSESSMENT - SELF  Does patient acknowledge current or past symptoms of dangerousness to self? No  Does parent/significant other report patient has current or past symptoms of dangerousness to self? No      Recent change in frequency/specificity/intensity of suicidal thoughts or self-harm behavior? No  Current access to firearms, medications, or other identified means of suicide/self-harm? No  If yes, willing to restrict access to means of suicide/self-harm? Yes  Protective factors present: Future-oriented    Current Suicide Risk: Not applicable  Crisis Safety Plan completed and copy given to patient: No    SAFETY ASSESSMENT - OTHERS  Does paor past symptoms of aggressive behavior or risk to others? No  Does parent/significant othtient acknowledge current or past symptoms of aggressive behavior or risk to others? No  Does parent/significant other report patient has current or past symptoms of aggressive behavior or risk to others? No    Recent change in frequency/specificity/intensity of thoughts or threats to harm others? No  Current access to firearms/other identified means of harm? No  If yes, willing to restrict access to weapons/means of harm? Yes  Protective factors present: Stable relationships and Stable employment    Current Homicide Risk:  Not applicable  Crisis Safety Plan completed and copy given to  patient? No  Based on information provided during the current assessment, is a mandated “duty to warn” being exercised? No    SUBSTANCE USE/ADDICTION HISTORY  [] Not applicable - patient 10 years of age or younger    Is there a family history of substance use/addiction? Yesfather is an alcoholic  Does patient acknowledge or parent/significant other report use of/dependence on substances? No  Last time patient used alcohol: last year  Within the past week? No  Last time patient used marijuana: never  Within the past month? No  Any other street drugs ever tried even once? No  Any use of prescription medications/pills without a prescription, or for reasons others than originally prescribed?  No  Any other addictive behavior reported (gambling, shopping, sex)? Yes food  Drug History:  Amphetamine:      Cannibis:      Cocaine:      Ecstasy:      Hallucinogen:      Inhalant:       Opiate:      Other:      Sedative:           What consequences does the patient associate with any of the above substance use and or addictive behaviors? Family problems: father mostly    Patient’s motivation/readiness for change: mod    [] Patient denies use of any substance/addictive behaviors    STRENGTHS/ASSETS  Strengths Identified by interviewer: Cognitive flexibilityloves her family I.e., children and H  Strengths Identified by patient: loved her mother      MENTAL STATUS/OBSERVATIONS   Participation: Active verbal participation  Grooming: Casual  Orientation:Alert   Behavior: Calm  Eye contact: Good   Mood:Anxious and Angry  Affect:Full range  Thought process: Goal-directed  Thought content:  Within normal limits  Speech: Rate within normal limits  Perception: Within normal limits  Memory: No gross evidence of memory deficits  Insight: Good  Judgment:  Adequate  Other:    Family/couple interaction observations: n/a    RESULTS OF SCREENING MEASURES:  [] Not applicable  Measure:   Score:     Measure:   Score:       CLINICAL FORMULATION: pt  is grieving the loss of her mother and the loss has made her more emotional. Pt is an emotional eater and she is in a medical weight loss program. Pt is fearful her emotions will cause her to over eat      DIAGNOSTIC IMPRESSION(S):  No diagnosis found.      IDENTIFIED NEEDS/PLAN:  [If any of these marked, trigger DISPOSITION list]  Mood/anxiety  Actively being addressed by [facility]    Does patient express agreement with the above plan? Yes     Referral appointment(s) scheduled? Yes       NORAH Chan.

## 2018-07-23 ENCOUNTER — OFFICE VISIT (OUTPATIENT)
Dept: HEALTH INFORMATION MANAGEMENT | Facility: MEDICAL CENTER | Age: 38
End: 2018-07-23
Payer: COMMERCIAL

## 2018-07-23 VITALS
HEART RATE: 82 BPM | SYSTOLIC BLOOD PRESSURE: 118 MMHG | BODY MASS INDEX: 30.31 KG/M2 | OXYGEN SATURATION: 98 % | WEIGHT: 164.7 LBS | HEIGHT: 62 IN | DIASTOLIC BLOOD PRESSURE: 60 MMHG

## 2018-07-23 DIAGNOSIS — E66.9 OBESITY (BMI 30.0-34.9): ICD-10-CM

## 2018-07-23 DIAGNOSIS — E78.1 HYPERTRIGLYCERIDEMIA: ICD-10-CM

## 2018-07-23 DIAGNOSIS — E55.9 VITAMIN D INSUFFICIENCY: ICD-10-CM

## 2018-07-23 DIAGNOSIS — K76.0 FATTY LIVER: ICD-10-CM

## 2018-07-23 PROCEDURE — 97803 MED NUTRITION INDIV SUBSEQ: CPT | Performed by: DIETITIAN, REGISTERED

## 2018-07-23 PROCEDURE — 99214 OFFICE O/P EST MOD 30 MIN: CPT | Performed by: INTERNAL MEDICINE

## 2018-07-23 NOTE — PROGRESS NOTES
"Nutrition Reassess: Medical Weight Management   Today's date: 7/23/2018  Referring Provider: Katie Moffett M.D.      Time: in/out 4:00-4:11pm  Visit#: 7    Subjective:  - in the process of moving, stress somewhat reduced as they have found the house and now just have to work on the moving process  - feels like she ate out more in the last week than in the last 6 months  - strayed a bit on her food choices during her trip to [place], but did reduce portion sizes   - still maintaining 3 days at the gym and home exercises  - finding that lack of planning ahead meals is what causes her most to make less healthy food choices  - still using 1-2 meal replacements a day     Anthropometrics/Objective  Today's weight:    Vitals:    07/23/18 1533   BP: 118/60   Weight: 74.7 kg (164 lb 11.2 oz)   Height: 1.575 m (5' 2\")     BMI:  Body mass index is 30.12 kg/m².  Starting weight/date 188.8 lbs (1/24/18)       Total weight change: 24.1 lbs          Meal Plan:   High protein diet with 1-2 meal replacements per day     ReAssesment/Notes:    Lily is able to identify the areas like lack of meal planning and preparation that contribute to weight gain and cause her to fall into poor eating habits. She finds that when she fails to do this she resorts to eating out and fast food much more frequently. Given her frequent fast food intake over the last month she did not gain too much weight likely because she was still very mindful of her portion size. Her goal is to get back into the habit of meal planning now that they will be home more in the evenings just packing up the house vs house hunting. Pt is eager to get to a point where she \"doesn't have to think so hard\" about making healthier choices - encouraged her that building habits takes time and eventually it may come easier to her. No other changes to her plan on today's visit.     Follow-up: 1 month   "

## 2018-07-23 NOTE — PROGRESS NOTES
"Bariatric Medicine Follow Up  Chief Complaint   Patient presents with   • Weight Gain       History of Present Illness:   Lily Vasquez is a 38 y.o. female who presents for weight management follow-up and to help address co-morbidities related to overweight, including hypertriglyceridemia, fatty liver.    During the patient's last visit, the following were discussed and recommended:  Weight Goal: 3-5% wt loss each month (pt goal weight is 135 lb)  Diet: Continue Premier protein for breakfast  High-protein/low-carb lunch, dinner, snacks in between meals as previously discussed  Resume tracking intake  Physical Activity: Daily walking, standing at desk  We will set more specific parameters at follow-up  Risk level for moderate/vigorous exercise program: Low  New Rx: None  Side Effects: Will review consent if applicable.  Follow-up: 6 weeks    Stressful month, has been unable to follow the meal plan daily but using PP each am.  In the process of buying a house, so she has been eating out a lot more than usual.  Trying to use a second meal replacement on some of these evenings.    Still waiting for CPAP.  Counseling helping a lot with eating behaviors and binging.  Not currently requiring statin or fenofibrate.  PCP monitoring fatty liver, with treatment to include weight reduction as we are doing.    Exercise:   Gym 3 d/wk  Walking daily when she can.     Review of Systems   Continues with insomnia.  All other ROS were reviewed and are otherwise unchanged from my previous visit with patient.    Physical Exam:    /60   Pulse 82   Ht 1.575 m (5' 2\")   Wt 74.7 kg (164 lb 11.2 oz)   LMP 06/07/2018   SpO2 98%   BMI 30.12 kg/m²   Waist: 36.5 in  Body fat % 38.9  REE 1453 kcal/day    Weight change since last visit: 0 lb (-24 lb total)  Waist Circum change since last visit: -0.5 in (-5 in total)    Constitutional: Oriented to person, place, and time and well-developed, well-nourished, and in no distress.    Head: " Normocephalic.   Musculoskeletal: Normal range of motion. No edema.   Neurological: Alert and oriented to person, place, and time. No muscle weakness.  Gait normal.   Skin: Warm and dry. Not diaphoretic.   Psychiatric: Mood, memory, affect and judgment normal.     Laboratory:   Recent labs reviewed.      Dietitian Assessment: I have reviewed the Dietitian's assessment related to this encounter.       ASSESSMENT/PLAN:  Body mass index is 30.12 kg/m².    Obesity Stage ([place]): 1; Class 1    1. Obesity (BMI 30.0-34.9)     2. Vitamin D insufficiency     3. Hypertriglyceridemia     4. Fatty liver       The patient continues to struggle somewhat with weight loss, but appears motivated and has maintained her weight loss to date.  Continue to monitor lipids, vitamin D and fatty liver with further weight loss.  Awaiting CPAP for JAN treatment.  Stress reduction/counseling will be important for further weight loss and maintenance of weight loss.    The patient and I have discussed at length and agree to the following recommendations, which are all addressing the above diagnoses:    Weight Goal: 3-5% wt loss each month (pt goal weight is 135 lb)  Diet: Premier protein for breakfast, add second for dinner if out and would otherwise be eating fast food  High Protein/Low Carb Meals and 2 snacks between meals daily  >100 g protein, <100 g total carbs daily  64+ oz water per day  Avoid sweet drinks and sodas  Track daily intake  Physical Activity: 3 days per week at the gym  Risk level for moderate/vigorous exercise program: Low  New Rx: None  Behavior change: Resume mindful eating, stimulus narrowing  Ongoing behavioral health counseling as she is doing  Follow-up: One month    Patient's body mass index is 30.12 kg/m². Exercise and nutrition counseling were performed at this visit.

## 2018-07-31 ENCOUNTER — HOSPITAL ENCOUNTER (OUTPATIENT)
Facility: MEDICAL CENTER | Age: 38
End: 2018-07-31
Payer: COMMERCIAL

## 2018-07-31 LAB
BDY FAT % MEASURED: 35.8 %
BP DIAS: 68 MMHG
BP SYS: 108 MMHG
CHOLEST SERPL-MCNC: 124 MG/DL (ref 100–199)
DIABETES HTDIA: NO
EVENT NAME HTEVT: NORMAL
FASTING HTFAS: YES
GLUCOSE SERPL-MCNC: 63 MG/DL (ref 65–99)
HDLC SERPL-MCNC: 44 MG/DL
HYPERTENSION HTHYP: NO
LDLC SERPL CALC-MCNC: 61 MG/DL
SCREENING LOC CITY HTCIT: NORMAL
SCREENING LOC STATE HTSTA: NORMAL
SCREENING LOCATION HTLOC: NORMAL
SMOKING HTSMO: NO
SUBSCRIBER ID HTSID: NORMAL
TRIGL SERPL-MCNC: 96 MG/DL (ref 0–149)

## 2018-07-31 PROCEDURE — S5190 WELLNESS ASSESSMENT BY NONPH: HCPCS

## 2018-07-31 PROCEDURE — 82947 ASSAY GLUCOSE BLOOD QUANT: CPT

## 2018-07-31 PROCEDURE — 80061 LIPID PANEL: CPT

## 2018-08-15 ENCOUNTER — OFFICE VISIT (OUTPATIENT)
Dept: BEHAVIORAL HEALTH | Facility: PHYSICIAN GROUP | Age: 38
End: 2018-08-15
Payer: COMMERCIAL

## 2018-08-15 DIAGNOSIS — Z63.4 BEREAVEMENT: ICD-10-CM

## 2018-08-15 PROCEDURE — 90834 PSYTX W PT 45 MINUTES: CPT | Performed by: MARRIAGE & FAMILY THERAPIST

## 2018-08-15 SDOH — SOCIAL STABILITY - SOCIAL INSECURITY: DISSAPEARANCE AND DEATH OF FAMILY MEMBER: Z63.4

## 2018-08-15 NOTE — BH THERAPY
[facility]  Therapy Progress Note    Patient Name: Lily Vasquez  Patient MRN: [id number]  Today's Date: 8/15/2018     Type of session:Individual psychotherapy  Length of session: 45 minutes  Persons in attendance:Patient    Subjective/New Info: pt's mo was accidentally killed after being hit by a car in the [employer] parking lot. Pt and her mother were very close and she has 4 siblings that agreed to press charges    Objective/Observations:   Participation: Active verbal participation   Grooming: Casual   Cognition: Alert   Eye contact: Good   Mood: Depressed and Angry   Affect: Sad and Tearful   Thought process: Logical   Speech: Rate within normal limits   Other:     Diagnoses: No diagnosis found.     Current risk:   SUICIDE: Not applicable   Homicide: Not applicable   Self-harm: Not applicable   Relapse: Not applicable   Other:    Safety Plan reviewed? Not Indicated   If evidence of imminent risk is present, intervention/plan:     Therapeutic Intervention(s): Clarify:  Clarify feelings and Clarify thoughts, Self-care skills, Stressors assessed and Supportive psychotherapy    Treatment Goal(s)/Objective(s) addressed: id pt stressors, clarified feelings and thoughts, focused pt on self care skills and provided support for pt     Progress toward Treatment Goals: Mild improvement    Plan:return for 1:1  - Next appointment scheduled:  9/5/2018    ESTRELLA Chan  8/15/2018

## 2018-08-23 ENCOUNTER — OFFICE VISIT (OUTPATIENT)
Dept: HEALTH INFORMATION MANAGEMENT | Facility: MEDICAL CENTER | Age: 38
End: 2018-08-23
Payer: COMMERCIAL

## 2018-08-23 VITALS
BODY MASS INDEX: 30.18 KG/M2 | DIASTOLIC BLOOD PRESSURE: 58 MMHG | SYSTOLIC BLOOD PRESSURE: 110 MMHG | HEIGHT: 62 IN | OXYGEN SATURATION: 96 % | WEIGHT: 164 LBS | HEART RATE: 77 BPM

## 2018-08-23 DIAGNOSIS — K76.0 FATTY LIVER: ICD-10-CM

## 2018-08-23 DIAGNOSIS — E66.9 OBESITY (BMI 30.0-34.9): ICD-10-CM

## 2018-08-23 DIAGNOSIS — E55.9 VITAMIN D INSUFFICIENCY: ICD-10-CM

## 2018-08-23 DIAGNOSIS — E78.1 HYPERTRIGLYCERIDEMIA: ICD-10-CM

## 2018-08-23 DIAGNOSIS — F51.04 CHRONIC INSOMNIA: ICD-10-CM

## 2018-08-23 PROCEDURE — 99214 OFFICE O/P EST MOD 30 MIN: CPT | Performed by: INTERNAL MEDICINE

## 2018-08-23 PROCEDURE — 97803 MED NUTRITION INDIV SUBSEQ: CPT | Performed by: DIETITIAN, REGISTERED

## 2018-08-23 NOTE — PROGRESS NOTES
"Nutrition Reassess: Medical Weight Management   Today's date: 8/23/2018  Referring Provider: Rea Ochoa      Time: in/out 2:56-3:05  Visit#: 8    Subjective:   - Lily is still under increased stress related to moving/buying a home. She got the keys to her new home yesterday and has been busy with packing, painting, cleaning, etc.   - She has been doing well w/ breakfast, lunch and snacks but is dining out on fast food almost nightly since her kitchen is packed    - She is still finding time for regular exercise though and having 1-2 MR per day    Diet history:   Breakfast - Premier Protein    Snack - celery and PB  Lunch - salad w/ spinach and arugula, grapes, sunflower seeds and 1 HB egg w/ balsamic vinaigrette   Snack - cucumber and nuts and/or cheese  Dinner - Egg McMuffin w/ small roberson, sope from [facility] . Jama Cheeseburger w/ chili or small roberson, Clay Cruz turkey sandwich w/ or w/o jama OR sometimes a second Premier Protein      Anthropometrics/Objective  Today's weight:    Vitals:    08/23/18 1457   BP: 110/58   Weight: 74.4 kg (164 lb)   Height: 1.575 m (5' 2\")     BMI:  Body mass index is 30 kg/m².  Starting weight/date: 188.8 lbs (1/24/18)       Total weight change : -24.8 lb            Meal Plan:   Low CHO / high protein / calorie controlled with 1-2 meal replacements per day     ReAssesment/Notes:  Lily is still continuing a majority of her healthy habits despite moving and more stressed than normal. She is dining out though and not adjusting her orders to be healthier options. We discussed healthier options at fast food like salads (being mindful of toppings and dressing), grilled chicken sandwich or hamburger w/ either 1/2 bun or no bun like protein-style In-n-Out, skipping fries, Unwich at [facility][1], grilled chicken bites at [employer] w/ side salad, low-calorie/lower CHO menu at [facility], etc. I also supported her choosing a 2nd Premier Protein for dinner as well. I also " recommended she ensure she is having adequate protein in her salad at lunch, maybe increase to 2 eggs. Other than that, she is doing well.    Follow-up: 4-6 weeks w/ DAVID and MD

## 2018-08-23 NOTE — PROGRESS NOTES
Bariatric Medicine Follow Up  Chief Complaint   Patient presents with   • Weight Gain       History of Present Illness:   Lily Vasquez is a 38 y.o. female who presents for weight management follow-up.    During the patient's last visit, the following were discussed and recommended:  Weight Goal: 3-5% wt loss each month (pt goal weight is 135 lb)  Diet: Premier protein for breakfast, add second for dinner if out and would otherwise be eating fast food  High Protein/Low Carb Meals and 2 snacks between meals daily  >100 g protein, <100 g total carbs daily  64+ oz water per day  Avoid sweet drinks and sodas  Track daily intake  Physical Activity: 3 days per week at the gym  Risk level for moderate/vigorous exercise program: Low  New Rx: None  Behavior change: Resume mindful eating, stimulus narrowing  Ongoing behavioral health counseling as she is doing  Follow-up: One month    PP for two meals per day for some meals.  Finds this helps keep her on track.  Has been eating fast food meals out a lot for dinner, because she is moving and does not have her kitchen set up in her new house yet.  Hopes to have by next week.  Strategized with the dietitian today about how to incorporate lower carb meals in the fast food restaurants where she is going.    Behavioral health counseling improving her mood, feels it is helping with her weight loss.  Very happy with it so far.    Was very happy with her recent healthy tracks lab results, with her low blood glucose and triglycerides.  Not on a statin or glucose lowering agent at this time.  Not on vitamin D repletion.  Sleep stable, overall sleeping somewhat better with the exception of moving in that disruption.    Exercise:   Gym 3 days per week     Review of Systems   No excessive hunger, lightheadedness, diaphoresis or change in bowel movements.  All other ROS were reviewed and are otherwise unchanged from my previous visit with patient.    Physical Exam:    /58   Pulse 77   " Ht 1.575 m (5' 2\")   Wt 74.4 kg (164 lb)   LMP 06/07/2018   SpO2 96%   BMI 30.00 kg/m²   Waist: 36.75 in  Body fat % 39  REE 1449 kcal/day    Weight change since last visit: -0.7 lb (-24 lb total)  Waist Circum change since last visit: 0 in (-5 in total)    Constitutional: Oriented to person, place, and time and well-developed, well-nourished, and in no distress.    Head: Normocephalic.   Musculoskeletal: Normal range of motion. No edema.   Neurological: Alert and oriented to person, place, and time. No muscle weakness.  Gait normal.   Skin: Warm and dry. Not diaphoretic.   Psychiatric: Mood, memory, affect and judgment normal.     Laboratory:   Recent labs reviewed.      Dietitian Assessment: I have reviewed the Dietitian's assessment related to this encounter.       ASSESSMENT/PLAN:  Body mass index is 30 kg/m².    Obesity Stage ([place]): 1; Class 1    1. Obesity (BMI 30.0-34.9)     2. Chronic insomnia     3. Hypertriglyceridemia     4. Vitamin D insufficiency       The patient continues to do well.  Her weight loss has plateaued somewhat given her household move and eating more fast food.  Focus on reduce CHO intake, which will continue to improve her insomnia.  Triglycerides much improved with CHO reduction and weight loss thus far.  Monitor vitamin D levels with weight loss; they should improve.  Maintaining activity level has probably helped her not gain weight recently.    The patient and I have discussed at length and agree to the following recommendations, which are all addressing the above diagnoses:    Weight Goal: 3-5% wt loss each month (pt goal weight is 135 lb)  Diet: High Protein/Low Carb Meals and 2 snacks between meals daily  1-2 Premier protein shakes per day  >100 g protein, <100 g total carbs daily  64+ oz water per day  Avoid sweet drinks and sodas  Track daily intake  Physical Activity: Gym workouts 3 days per week  Risk level for moderate/vigorous exercise program: Low  New Rx: " None  Behavior change: Mindful eating, resume tracking if weight loss plateaus.  Continue behavioral health counseling.  Follow-up: 2 month  1 month with RD    Patient's body mass index is 30 kg/m². Exercise and nutrition counseling were performed at this visit.

## 2018-08-31 ENCOUNTER — HOSPITAL ENCOUNTER (OUTPATIENT)
Facility: MEDICAL CENTER | Age: 38
End: 2018-08-31
Attending: NURSE PRACTITIONER
Payer: COMMERCIAL

## 2018-08-31 ENCOUNTER — OFFICE VISIT (OUTPATIENT)
Dept: MEDICAL GROUP | Facility: MEDICAL CENTER | Age: 38
End: 2018-08-31
Payer: COMMERCIAL

## 2018-08-31 VITALS
RESPIRATION RATE: 16 BRPM | TEMPERATURE: 98.2 F | WEIGHT: 162 LBS | HEART RATE: 78 BPM | BODY MASS INDEX: 29.81 KG/M2 | SYSTOLIC BLOOD PRESSURE: 100 MMHG | HEIGHT: 62 IN | DIASTOLIC BLOOD PRESSURE: 68 MMHG | OXYGEN SATURATION: 100 %

## 2018-08-31 DIAGNOSIS — J02.9 SORE THROAT: ICD-10-CM

## 2018-08-31 LAB
INT CON NEG: NEGATIVE
INT CON POS: POSITIVE
S PYO AG THROAT QL: NEGATIVE

## 2018-08-31 PROCEDURE — 99213 OFFICE O/P EST LOW 20 MIN: CPT | Performed by: NURSE PRACTITIONER

## 2018-08-31 PROCEDURE — 87880 STREP A ASSAY W/OPTIC: CPT | Performed by: NURSE PRACTITIONER

## 2018-08-31 PROCEDURE — 87070 CULTURE OTHR SPECIMN AEROBIC: CPT

## 2018-08-31 NOTE — PROGRESS NOTES
"Subjective:   Lily Vasquez is a 38 y.o. female here today for sore throat    Sore throat  Started about 3 days ago with sore throat, sinus congestion, headaches, nausea no vomiting, body aches, slight runny nose, slight ear pain. Tactile fevers, has not checked temp at home. Her children had mild colds last week which have resolved. Taking ibuprofen for headache, no other OTC medications. Denies cough, SOB, abdominal pain.      Current medicines (including changes today)  Current Outpatient Prescriptions   Medication Sig Dispense Refill   • amoxicillin (AMOXIL) 500 MG Cap Take 1 Cap by mouth 2 times a day. 20 Cap 0   • Multiple Vitamin (MULTIVITAMINS PO) Take  by mouth.     • calcium carbonate (TUMS) 500 MG Chew Tab Take 500 mg by mouth every day.     • ibuprofen (MOTRIN) 200 MG Tab Take 200 mg by mouth every 6 hours as needed.       No current facility-administered medications for this visit.      She  has a past medical history of Anesthesia; Arthritis; Bronchitis; Chickenpox; Dental disorder; Eating disorder (7/10/2018); Fatty liver; GERD (gastroesophageal reflux disease); Gestational diabetes; Hemorrhoids; Hiatus hernia syndrome; Indigestion; Influenza; Neck pain; Obesity; and Vitamin D deficiency. She also has no past medical history of CAD (coronary artery disease); COPD; Liver disease; or Seizure disorder (HCC).    ROS   No chest pain, no shortness of breath, no abdominal pain  Positive ROS as per HPI.  All other systems reviewed and are negative.     Objective:     Blood pressure 100/68, pulse 78, temperature 36.8 °C (98.2 °F), resp. rate 16, height 1.575 m (5' 2.01\"), weight 73.5 kg (162 lb), last menstrual period 06/07/2018, SpO2 100 %. Body mass index is 29.62 kg/m².     Physical Exam:  Constitutional: Alert, no distress.  Skin: Warm, dry, good turgor, no rashes in visible areas.  Eye: Equal, round and reactive, conjunctiva clear, lids normal.  ENMT: Lips without lesions, good dentition, oropharynx " clear.  Neck: Trachea midline, no masses, no thyromegaly. No cervical or supraclavicular lymphadenopathy  Respiratory: Unlabored respiratory effort, lungs clear to auscultation, no wheezes, no ronchi.  Cardiovascular: Normal S1, S2, no murmur, no edema.  Abdomen: Soft, non-tender, no masses, no hepatosplenomegaly.  Psych: Alert and oriented x3, normal affect and mood.      Assessment and Plan:   The following treatment plan was discussed    1. Sore throat  Unstable  Likely viral URI  PLAN: Discussed dx and tx of URIs  Discussed the importance of avoiding unnecessary abx therapy.  Suggested symptomatic OTC remedies.  RTC prn.  - POCT Rapid Strep A      Followup: Return if symptoms worsen or fail to improve.    I have placed the below orders and discussed them with an approved delegating provider. The MA is performing the below orders under the direction of Dr. Long

## 2018-08-31 NOTE — ASSESSMENT & PLAN NOTE
Started about 3 days ago with sore throat, sinus congestion, headaches, nausea no vomiting, body aches, slight runny nose, slight ear pain. Tactile fevers, has not checked temp at home. Her children had mild colds last week which have resolved. Taking ibuprofen for headache, no other OTC medications. Denies cough, SOB, abdominal pain.

## 2018-09-04 ENCOUNTER — TELEPHONE (OUTPATIENT)
Dept: MEDICAL GROUP | Facility: MEDICAL CENTER | Age: 38
End: 2018-09-04

## 2018-09-04 NOTE — TELEPHONE ENCOUNTER
----- Message from OMAR Streeter sent at 9/4/2018 10:35 AM PDT -----  Please notify patient that her throat culture was negative.    OMAR Streeter

## 2018-09-05 ENCOUNTER — OFFICE VISIT (OUTPATIENT)
Dept: BEHAVIORAL HEALTH | Facility: PHYSICIAN GROUP | Age: 38
End: 2018-09-05
Payer: COMMERCIAL

## 2018-09-05 DIAGNOSIS — F41.9 ANXIETY: ICD-10-CM

## 2018-09-05 DIAGNOSIS — Z63.4 BEREAVEMENT: ICD-10-CM

## 2018-09-05 PROCEDURE — 90834 PSYTX W PT 45 MINUTES: CPT | Performed by: MARRIAGE & FAMILY THERAPIST

## 2018-09-05 SDOH — SOCIAL STABILITY - SOCIAL INSECURITY: DISSAPEARANCE AND DEATH OF FAMILY MEMBER: Z63.4

## 2018-09-05 NOTE — BH THERAPY
[facility]  Therapy Progress Note    Patient Name: Lily Vasquez  Patient MRN: [id number]  Today's Date: 9/5/2018     Type of session:Individual psychotherapy  Length of session: 50 minutes  Persons in attendance:Patient    Subjective/New Info: pt moved and siblings helped and they had conflict with pt; one year anniversary of mother's accidental death is in 10 days    Objective/Observations:   Participation: Active verbal participation   Grooming: Casual   Cognition: Alert   Eye contact: Good   Mood: Anxious and sad   Affect: Sad, Anxious and Tearful   Thought process: Circumstantial   Speech: Rate within normal limits   Other:     Diagnoses: No diagnosis found.     Current risk:   SUICIDE: Not applicable   Homicide: Not applicable   Self-harm: Not applicable   Relapse: Not applicable   Other:    Safety Plan reviewed? No   If evidence of imminent risk is present, intervention/plan:     Therapeutic Intervention(s): Clarify:  Clarify feelings and Clarify thoughts, Cognitive modification, Role-playing, Self-care skills, Stressors assessed and Supportive psychotherapy    Treatment Goal(s)/Objective(s) addressed: id pt stressors, clarified feelings and thoughts, using role play and cognitive modification tech's focused pt on self care skills and provided support for pt    Progress toward Treatment Goals: Mild improvement    Plan:return for 1:1  - Next appointment scheduled:  10/10/2018    ESTRELLA Chan  9/5/2018

## 2018-09-19 ENCOUNTER — IMMUNIZATION (OUTPATIENT)
Dept: OCCUPATIONAL MEDICINE | Facility: CLINIC | Age: 38
End: 2018-09-19

## 2018-09-19 DIAGNOSIS — Z23 NEED FOR VACCINATION: ICD-10-CM

## 2018-09-19 PROCEDURE — 90686 IIV4 VACC NO PRSV 0.5 ML IM: CPT | Performed by: PREVENTIVE MEDICINE

## 2018-09-24 ENCOUNTER — NON-PROVIDER VISIT (OUTPATIENT)
Dept: HEALTH INFORMATION MANAGEMENT | Facility: MEDICAL CENTER | Age: 38
End: 2018-09-24
Payer: COMMERCIAL

## 2018-09-24 VITALS — BODY MASS INDEX: 30.64 KG/M2 | HEIGHT: 62 IN | WEIGHT: 166.5 LBS

## 2018-09-24 DIAGNOSIS — K76.0 FATTY LIVER: ICD-10-CM

## 2018-09-24 PROCEDURE — 97803 MED NUTRITION INDIV SUBSEQ: CPT | Performed by: DIETITIAN, REGISTERED

## 2018-09-24 NOTE — PROGRESS NOTES
"Nutrition Reassess: Medical Weight Management   Today's date: 9/24/2018  Referring Provider: No ref. provider found      Time: in/out 3:31-3:51pm   Visit#: 9    Subjective:  Pt states she has been stressed with moving in to her new house  Admits she has been eating more junk food- chips and cookies when she is stressed after work   Is not eating fast food any more and is cooking dinners but munches on snack junk foods while cooking   Has Premier Protein for breakfast daily   Sometimes uses 2nd MR for dinner   Feels she has lost some motivation but is eager to get back on track   She is planning to start a support group at [facility] for health and fitness. Plans to work out at lunch breakfast with co workers      Anthropometrics/Objective  Today's weight:    Vitals:    09/24/18 1554   Weight: 75.5 kg (166 lb 8 oz)   Height: 1.575 m (5' 2\")     BMI:  Body mass index is 30.45 kg/m².  Starting weight/date 188.8lb      Total weight change : -22.3lb            Meal Plan:   High protein, low carb diet with 1-2 meal replacements per day     ReAssesment/Notes:  Today we discussed tips for getting back on track. Pt agrees she needs to focus on deal with her stress in more healthy ways aside from eating. She will keep the junk food out of the house and is going to think of things she can do when she gets home that are more calming.     Follow-up: 4 weeks    "

## 2018-10-10 ENCOUNTER — OFFICE VISIT (OUTPATIENT)
Dept: BEHAVIORAL HEALTH | Facility: CLINIC | Age: 38
End: 2018-10-10
Payer: COMMERCIAL

## 2018-10-10 DIAGNOSIS — Z63.4 BEREAVEMENT: ICD-10-CM

## 2018-10-10 DIAGNOSIS — F41.9 ANXIETY: ICD-10-CM

## 2018-10-10 PROCEDURE — 90834 PSYTX W PT 45 MINUTES: CPT | Performed by: MARRIAGE & FAMILY THERAPIST

## 2018-10-10 SDOH — SOCIAL STABILITY - SOCIAL INSECURITY: DISSAPEARANCE AND DEATH OF FAMILY MEMBER: Z63.4

## 2018-10-10 NOTE — BH THERAPY
" [facility]  Therapy Progress Note    Patient Name: Lily Vasquez  Patient MRN: [id number]  Today's Date: 10/10/2018     Type of session:Individual psychotherapy  Length of session: 50 minutes  Persons in attendance:Patient    Subjective/New Info: pt's mother's  was this last weekend for organ donor's and their families. Pt said it was very emotional    Objective/Observations:   Participation: Active verbal participation   Grooming: Casual   Cognition: Fully Oriented   Eye contact: Good   Mood: Depressed and Anxious   Affect: Constricted   Thought process: Goal-directed   Speech: Rate within normal limits   Other:     Diagnoses: No diagnosis found.     Current risk:   SUICIDE: Not applicable   Homicide: Not applicable   Self-harm: Not applicable   Relapse: Not applicable   Other:    Safety Plan reviewed? Not Indicated   If evidence of imminent risk is present, intervention/plan:     Therapeutic Intervention(s): Clarify:  Clarify feelings and Clarify thoughts, Positive behavior reinforced, Self-care skills, Stressors assessed and Supportive psychotherapy    Treatment Goal(s)/Objective(s) addressed: id pt triggers including kindness will open the tears; clarified feelings of grief and loss pt misses her mother immensely she was her best friend, reinforced pt's positive behaviors in taking care of herself b/c she puts herself last behind her H, children and her family; focused pt on self care and finding time for herself; provided support for pt     Progress toward Treatment Goals: Mild improvement    Plan:  - \"Homework\" recommendation: self care including time for herself    ESTRELLA Chan  10/10/2018                                   "

## 2018-10-17 ENCOUNTER — TELEPHONE (OUTPATIENT)
Dept: SLEEP MEDICINE | Facility: MEDICAL CENTER | Age: 38
End: 2018-10-17

## 2018-10-17 NOTE — TELEPHONE ENCOUNTER
Pt currently scheduled to see DEL Laughlin, JIMMY 10/18/18. Per last OV 6/15/18 [facility] lab was ordered. No results in the system.     I contacted the patient informed lab was ordered, pt was unaware. Did not have testing, will try to complete prior to visit. I informed her she will still need to be seen because it is her first compliance, encouraged she bring machine in with her. If patient unable to complete testing she can complete after visit and discuss with provider.

## 2018-10-18 ENCOUNTER — SLEEP CENTER VISIT (OUTPATIENT)
Dept: SLEEP MEDICINE | Facility: MEDICAL CENTER | Age: 38
End: 2018-10-18
Payer: COMMERCIAL

## 2018-10-18 VITALS
OXYGEN SATURATION: 99 % | BODY MASS INDEX: 30.36 KG/M2 | WEIGHT: 165 LBS | TEMPERATURE: 98.1 F | SYSTOLIC BLOOD PRESSURE: 120 MMHG | DIASTOLIC BLOOD PRESSURE: 68 MMHG | HEIGHT: 62 IN | RESPIRATION RATE: 16 BRPM | HEART RATE: 80 BPM

## 2018-10-18 DIAGNOSIS — G47.33 OSA (OBSTRUCTIVE SLEEP APNEA): ICD-10-CM

## 2018-10-18 DIAGNOSIS — F51.04 CHRONIC INSOMNIA: ICD-10-CM

## 2018-10-18 DIAGNOSIS — G25.81 RLS (RESTLESS LEGS SYNDROME): ICD-10-CM

## 2018-10-18 PROCEDURE — 99213 OFFICE O/P EST LOW 20 MIN: CPT | Performed by: NURSE PRACTITIONER

## 2018-10-18 RX ORDER — ZOLPIDEM TARTRATE 5 MG/1
5 TABLET ORAL NIGHTLY PRN
Qty: 2 TAB | Refills: 0 | Status: SHIPPED | OUTPATIENT
Start: 2018-10-18 | End: 2018-10-20

## 2018-10-18 ASSESSMENT — ENCOUNTER SYMPTOMS
RESPIRATORY NEGATIVE: 1
INSOMNIA: 1
EYE PAIN: 0
BRUISES/BLEEDS EASILY: 0
CONSTITUTIONAL NEGATIVE: 1
EYE DISCHARGE: 0
CARDIOVASCULAR NEGATIVE: 1

## 2018-10-18 NOTE — PROGRESS NOTES
Chief Complaint   Patient presents with   • Apnea     last seen 6/15/18         HPI: This patient is a 38 y.o. female, who presents for follow-up obstructive sleep apnea with first compliance check.     PSG indicates severe obstructive sleep apnea with an AHI of 29.6, minimum oxygen saturation of 86 %. She had split night study and did well with CPAP 7 cm H2O. She was ordered CPAP after her last visit. Compliance download over the past 30 days indicates 96 % compliance, average use of 5 hours 47 minutes per night, AHI of 29.6.   She does feel that she is falling asleep quicker and getting a deeper level of sleep but still reports fatigue during the day.    She is a history of RLS, sleep study showed increased limb movements however these did not result in arousals.  Ferritin level was checked and was significantly low.  She was recommended to start iron supplementation, however admits to being fairly noncompliant with this.  She forgets to take this.  She is encouraged to do so today.  She is pending a repeat ferritin level.  Her symptoms of RLS persist although mildly improved on CPAP.    Past Medical History:   Diagnosis Date   • Anesthesia     Severe PONV   • Arthritis     TMJ   • Bronchitis    • Chickenpox    • Dental disorder     permanent retainer   • Eating disorder 7/10/2018   • Fatty liver    • GERD (gastroesophageal reflux disease)    • Gestational diabetes    • Hemorrhoids    • Hiatus hernia syndrome    • Indigestion     GERD   • Influenza    • Neck pain    • Obesity    • Vitamin D deficiency        Social History   Substance Use Topics   • Smoking status: Never Smoker   • Smokeless tobacco: Never Used   • Alcohol use No       Family History   Problem Relation Age of Onset   • Arthritis Mother    • Hypertension Mother    • Hyperlipidemia Mother    • Sleep Apnea Mother    • Psychiatry Father    • Alcohol abuse Father    • Thyroid Sister    • Heart Disease Maternal Grandmother    • Heart Disease Maternal  "Grandfather    • Heart Disease Paternal Grandmother    • Diabetes Paternal Grandmother    • Cancer Paternal Grandfather         brain   • Stroke Unknown        Immunization History   Administered Date(s) Administered   • Influenza (IM) Preservative Free 12/07/2010, 10/25/2011   • Influenza Seasonal Injectable 09/21/2013   • Influenza TIV (IM) 01/18/2013, 09/21/2013   • Influenza Vaccine H1N1 01/30/2010   • Influenza Vaccine Pediatric Preserv Free 12/07/2010   • Influenza Vaccine Quad Inj (Pf) 11/16/2015, 11/03/2016, 10/10/2017, 09/19/2018   • Tdap Vaccine 05/07/2014   • Tuberculin Skin Test 12/06/2010, 12/13/2010       Current medications as of today   Current Outpatient Prescriptions   Medication Sig Dispense Refill   • Multiple Vitamin (MULTIVITAMINS PO) Take  by mouth.     • calcium carbonate (TUMS) 500 MG Chew Tab Take 500 mg by mouth every day.     • ibuprofen (MOTRIN) 200 MG Tab Take 200 mg by mouth every 6 hours as needed.       No current facility-administered medications for this visit.        Allergies: Codeine; Morphine; Sulfa drugs; and Tape    Blood pressure 120/68, pulse 80, temperature 36.7 °C (98.1 °F), temperature source Temporal, resp. rate 16, height 1.575 m (5' 2\"), weight 74.8 kg (165 lb), last menstrual period 06/07/2018, SpO2 99 %, not currently breastfeeding.      Review of Systems   Constitutional: Negative.    HENT: Negative.    Eyes: Negative for pain and discharge.   Respiratory: Negative.    Cardiovascular: Negative.    Endo/Heme/Allergies: Negative for environmental allergies. Does not bruise/bleed easily.   Psychiatric/Behavioral: The patient has insomnia.        Physical Exam   Constitutional: She is oriented to person, place, and time and well-developed, well-nourished, and in no distress.   HENT:   Head: Normocephalic and atraumatic.   Eyes: Pupils are equal, round, and reactive to light.   Neck: Normal range of motion. Neck supple. No tracheal deviation present.   Pulmonary/Chest: " Effort normal.   Musculoskeletal: Normal range of motion. She exhibits no edema.   Neurological: She is alert and oriented to person, place, and time.   Skin: Skin is warm and dry.   Psychiatric: Mood, memory, affect and judgment normal.   Vitals reviewed.      Diagnoses/Plan:    1. JAN (obstructive sleep apnea)  Patient's prior sleep study showed severe sleep apnea. she did have central apneas during titration phase of her initial sleep study thought to be treatment onset.  She was initiated on CPAP but has had no improvement in AHI.  She will return for a dedicated night titration to higher pressures of CPAP, BiPAP and possibly ASV if central apneas persist.  She is understanding of this.  Ambien provided for the night of her sleep testing if necessary.  - Polysomnography Titration; Future  - zolpidem (AMBIEN) 5 MG Tab; Take 1 Tab by mouth at bedtime as needed for Sleep (1 to 2 po qhs prn insomnia/sleep study. Bring to sleep study.) for up to 2 days.  Dispense: 2 Tab; Refill: 0    2. RLS (restless legs syndrome)  Patient has a history of RLS.  Prior ferritin level is significantly low.  She reports heavy menses.  Unfortunately, she has not been compliant with iron supplementation.  She will start this.  She will repeat ferritin level in the upcoming weeks.    3. Chronic insomnia  In part related to untreated sleep apnea and should improve once therapy adjusted      I would like her to follow-up with sleep medicine physician to review titration and lab work.      This dictation was created using voice recognition software. The accuracy of the dictation is limited to the abilities of the software. I expect there may be some errors of grammar and possibly content.

## 2018-10-24 ENCOUNTER — OFFICE VISIT (OUTPATIENT)
Dept: HEALTH INFORMATION MANAGEMENT | Facility: MEDICAL CENTER | Age: 38
End: 2018-10-24
Payer: COMMERCIAL

## 2018-10-24 ENCOUNTER — OFFICE VISIT (OUTPATIENT)
Dept: BEHAVIORAL HEALTH | Facility: CLINIC | Age: 38
End: 2018-10-24
Payer: COMMERCIAL

## 2018-10-24 VITALS
SYSTOLIC BLOOD PRESSURE: 110 MMHG | HEART RATE: 74 BPM | OXYGEN SATURATION: 97 % | BODY MASS INDEX: 31.32 KG/M2 | WEIGHT: 170.2 LBS | DIASTOLIC BLOOD PRESSURE: 60 MMHG | HEIGHT: 62 IN

## 2018-10-24 DIAGNOSIS — F43.20 ADJUSTMENT DISORDER, UNSPECIFIED TYPE: ICD-10-CM

## 2018-10-24 DIAGNOSIS — G47.31 CENTRAL SLEEP APNEA: ICD-10-CM

## 2018-10-24 DIAGNOSIS — Z63.4 BEREAVEMENT: ICD-10-CM

## 2018-10-24 DIAGNOSIS — K76.0 FATTY LIVER: ICD-10-CM

## 2018-10-24 DIAGNOSIS — E78.1 HYPERTRIGLYCERIDEMIA: ICD-10-CM

## 2018-10-24 DIAGNOSIS — R63.5 WEIGHT GAIN: ICD-10-CM

## 2018-10-24 DIAGNOSIS — F41.9 ANXIETY: ICD-10-CM

## 2018-10-24 DIAGNOSIS — F50.9 EATING DISORDER: ICD-10-CM

## 2018-10-24 DIAGNOSIS — F50.9 DISORDERED EATING: ICD-10-CM

## 2018-10-24 DIAGNOSIS — F51.04 CHRONIC INSOMNIA: ICD-10-CM

## 2018-10-24 DIAGNOSIS — E66.9 OBESITY (BMI 30.0-34.9): ICD-10-CM

## 2018-10-24 PROCEDURE — 90834 PSYTX W PT 45 MINUTES: CPT | Performed by: MARRIAGE & FAMILY THERAPIST

## 2018-10-24 PROCEDURE — 97803 MED NUTRITION INDIV SUBSEQ: CPT | Performed by: DIETITIAN, REGISTERED

## 2018-10-24 PROCEDURE — 99214 OFFICE O/P EST MOD 30 MIN: CPT | Performed by: INTERNAL MEDICINE

## 2018-10-24 SDOH — SOCIAL STABILITY - SOCIAL INSECURITY: DISSAPEARANCE AND DEATH OF FAMILY MEMBER: Z63.4

## 2018-10-24 NOTE — PROGRESS NOTES
"Bariatric Medicine Follow Up  Chief Complaint   Patient presents with   • Weight Gain       History of Present Illness:   Lily Vasquez is a 38 y.o. female who presents for weight management follow-up and to help address co-morbidities related to overweight, including insomnia, HLD/TG, VDD.    During the patient's last visit, the following were discussed and recommended:  Weight Goal: 3-5% wt loss each month (pt goal weight is 135 lb)  Diet: High Protein/Low Carb Meals and 2 snacks between meals daily  1-2 Premier protein shakes per day  >100 g protein, <100 g total carbs daily  64+ oz water per day  Avoid sweet drinks and sodas  Track daily intake  Physical Activity: Gym workouts 3 days per week  Risk level for moderate/vigorous exercise program: Low  New Rx: None  Behavior change: Mindful eating, resume tracking if weight loss plateaus.  Continue behavioral health counseling.  Follow-up: 2 month  1 month with RD    A lot of stress eating, able to exercise less due to moving and busy work schedule.  Still having 2 PP shakes per day.  Felt very sick having bagel so won't have those anymore.  Wants tips again on what to eat when she is not using meal replacements.  Had been eating out a lot of fast food at prior visits.    Still going for therapy and very motivated to get back on track with wt loss.  Does not really want to try wt loss meds but will consider.  Sleep not good.  Needs another sleep study b/c CPAP not working.  May need bipap.  Immaculata to be central apnea.  Not currently on statin, vitamin D repletion.    Exercise:   Gym workouts when time but not really going     Review of Systems   Still fatigued during the day.  High stress levels at times.  No change in BMs.  All other ROS were reviewed and are otherwise unchanged from my previous visit with patient.    Physical Exam:    /60 (BP Location: Right arm, Patient Position: Sitting, BP Cuff Size: Adult)   Pulse 74   Ht 1.575 m (5' 2\")   Wt 77.2 kg " (170 lb 3.2 oz)   LMP 06/07/2018   SpO2 97%   BMI 31.13 kg/m²   Waist: 37.75 in  Body fat % 40  REE 1473 kcal/day    Weight change since last visit: +6 lb (-18.6 lb total)  Waist Circum change since last visit: +1 in (-4 in total)    Constitutional: Oriented to person, place, and time and well-developed, well-nourished, and in no distress.    Head: Normocephalic.   Musculoskeletal: Normal range of motion. No edema.   Neurological: Alert and oriented to person, place, and time. No muscle weakness.  Gait normal.   Skin: Warm and dry. Not diaphoretic.   Psychiatric: Mood, memory, affect and judgment normal.     Laboratory:   None new.      Dietitian Assessment: I have reviewed the Dietitian's assessment related to this encounter.       ASSESSMENT/PLAN:  Body mass index is 31.13 kg/m².    Obesity Stage ([place]): 1; Class 1    1. Weight gain     2. Obesity (BMI 30.0-34.9)     3. Eating disorder     4. Central sleep apnea     5. Chronic insomnia     6. Hypertriglyceridemia     7. Fatty liver     8. Adjustment disorder, unspecified type       The patient continues to struggle with stress, likely binge eating.  Has regained some of the weight she has lost.  Counseling appears to be helping reduce overall stress levels, also addressing sleep issues which appear to be central sleep apnea requiring treatment other than CPAP.  Monitor lipids, LFTs with weight loss.    The patient and I have discussed at length and agree to the following recommendations, which are all addressing the above diagnoses:    Weight Goal: 3-5% wt loss each month (pt goal weight is 135 lb)  Diet: High Protein/Low Carb Meals and 2 snacks between meals daily  2 Premier protein breakfast, lunch daily  64+ oz water per day  Avoid sweet drinks and sodas  Track daily intake if possible and time permits  Physical Activity:  Resume gym workouts, more workouts at work, resume fit bit tracking of steps  Risk level for moderate/vigorous exercise program:  Low  New Rx: Defers wt loss meds but given info on three to consider if wt loss plateau  Behavior change: Continue behavioral health counseling  Follow-up: 2 months    Patient's body mass index is 31.13 kg/m². Exercise and nutrition counseling were performed at this visit.

## 2018-10-24 NOTE — BH THERAPY
[facility]  Therapy Progress Note    Patient Name: Lily Vasquez  Patient MRN: [id number]  Today's Date: 10/24/2018     Type of session:Individual psychotherapy  Length of session: 45 minutes  Persons in attendance:Patient    Subjective/New Info: pt is grieving the death of her mother and she is feeling invisible, inadequate and depressed    Objective/Observations:   Participation: Active verbal participation   Grooming: Casual   Cognition: Alert   Eye contact: Good   Mood: Depressed and Anxious   Affect: Constricted   Thought process: Goal-directed   Speech: Rate within normal limits   Other:     Diagnoses: No diagnosis found.     Current risk:   SUICIDE: Not applicable   Homicide: Not applicable   Self-harm: Not applicable   Relapse: Not applicable   Other:    Safety Plan reviewed? No   If evidence of imminent risk is present, intervention/plan:     Therapeutic Intervention(s): Clarify:  Clarify feelings and Clarify thoughts, Cognitive modification, Self-care skills, Stressors assessed and Supportive psychotherapy    Treatment Goal(s)/Objective(s) addressed: id pt triggers including her son has ADHD and she did also, clarified shame and frustration for having ADHD and getting in trouble a lot as a child like her son; using cognitive modification tech's helped pt heal past trauma wounds regarding ADHD in middle school and provided support for pt     Progress toward Treatment Goals: Mild improvement    Plan:return for 1:1  - Next appointment scheduled:  11/12/2018    ESTRELLA Chan  10/24/2018

## 2018-10-24 NOTE — PROGRESS NOTES
"Nutrition Reassess: Medical Weight Management   Today's date: 10/24/2018  Referring Provider: Katie Moffett M.D.    Time: in/out 4:05-4:27  Visit#: 10    Subjective:  - struggling over the last few months  - doing well early in the day, but around 3pm when she leaves work grabs jelly beans or chips that are out where she picks her kids up despite having a healthy veggie and protein snack packed  - worried about the holidays  - didn't realize she gained this much  - just moved and has not been able to get to the gym regularly  - using premier protein every day, had a bagel and felt very ill, doesn't want to feel like that again  - has been telling herself it's only a few pieces of candy, but then realizes at the end of the day it all added up  - now commuting 30 minute drive home   - knows she is stress eating and has been seeing behavioral health for this which she feels is helping     Diet history:   Breakfast - PP  Snack - celery and PB  Lunch - salad + protein (usually eggs) w/ grapes and sunflower seeds or apples and pumpkin seeds + added veggies if she gets the salad at work + balsamic vinaigrette or ACV vinaigrette   Snack - chips, jelly beans   Dinner - sometimes out or home cooked     Anthropometrics/Objective  Today's weight:    Vitals:    10/24/18 1546   BP: 110/60   Weight: 77.2 kg (170 lb 3.2 oz)   Height: 1.575 m (5' 2\")     BMI:  Body mass index is 31.13 kg/m².  Starting weight/date 188.8 lbs (1/24/18)       Total weight change: -18.6 lbs (+ 3.7 lbs since last visit)            Meal Plan:   High protein, low carb diet with 1-2 meal replacements per day     ReAssesment/Notes:  Lily has been struggling with stress eating more recently. We discussed some ways she can navigate this without turning to food like going for a quick walk, listening to music, or meditating. Gave her the snack bar list for her to try around 3 pm instead of chips or candy that would have some protein as well to help her feel " satisfied, but still be sweet enough to curb her cravings at this time. We discussed importance of setting specific goals and having a support system (pending a walking group at work on lunch breaks) when she is struggling. Her goal is to resume some time of exercise (either a home workout video, walking, or the gym) for 30 minutes 2 days a week. Ultimately she would like to be doing this 5 days a week, but will start here to get herself back into a routine.     Follow-up: 7 weeks with MD/DAVID

## 2018-11-12 ENCOUNTER — OFFICE VISIT (OUTPATIENT)
Dept: BEHAVIORAL HEALTH | Facility: CLINIC | Age: 38
End: 2018-11-12
Payer: COMMERCIAL

## 2018-11-12 DIAGNOSIS — Z63.0 PARTNER RELATIONSHIP PROBLEM: ICD-10-CM

## 2018-11-12 DIAGNOSIS — F32.9 REACTIVE DEPRESSION: ICD-10-CM

## 2018-11-12 DIAGNOSIS — F41.9 ANXIETY: ICD-10-CM

## 2018-11-12 PROCEDURE — 90834 PSYTX W PT 45 MINUTES: CPT | Performed by: MARRIAGE & FAMILY THERAPIST

## 2018-11-12 SDOH — SOCIAL STABILITY - SOCIAL INSECURITY: PROBLEMS IN RELATIONSHIP WITH SPOUSE OR PARTNER: Z63.0

## 2018-11-12 NOTE — BH THERAPY
" [facility]  Therapy Progress Note    Patient Name: Lily Vasquez  Patient MRN: [id number]  Today's Date: 11/12/2018     Type of session:Individual psychotherapy  Length of session: 50 minutes  Persons in attendance:Patient    Subjective/New Info: the one year anniversary of her mothers accidental death was October and the holidays are quickly approaching pt is staying busy with work, home, special needs son and family; pt and family are suing [employer] for mom's accidental death; pt feels alone and disengaged from her family that is stoic    Objective/Observations:   Participation: Active verbal participation   Grooming: Casual   Cognition: Alert   Eye contact: Good   Mood: Depressed and Anxious   Affect: Sad   Thought process: Goal-directed   Speech: Rate within normal limits   Other:     Diagnoses: No diagnosis found.     Current risk:   SUICIDE: Not applicable   Homicide: Not applicable   Self-harm: Not applicable   Relapse: Lowfood   Other:    Safety Plan reviewed? No   If evidence of imminent risk is present, intervention/plan:     Therapeutic Intervention(s): Clarify:  Clarify feelings and Clarify thoughts, Cognitive modification, Parenting/familial roles addressed, Self-care skills, Stressors assessed and Supportive psychotherapy    Treatment Goal(s)/Objective(s) addressed: id pt stressors including one year anniversary October of her mothers accidental death and pt's son is special needs and he has no friends at school and he is not happy at school and taking medication;, clarified pt has guilt for yelling at her son about \"everything\"; focused pt on parenting skills and positive reinforcement vs negative reinforcement; using cognitive modification helped pt validate herself as a mother; encouraged self care and provided support for pt    Progress toward Treatment Goals: Mild improvement    Plan:  - \"Homework\" recommendation: return to herself validate her needs and wants and feelings    Janessa " ESTRELLA Escobar  11/12/2018

## 2018-11-16 ENCOUNTER — SLEEP STUDY (OUTPATIENT)
Dept: SLEEP MEDICINE | Facility: MEDICAL CENTER | Age: 38
End: 2018-11-16
Attending: NURSE PRACTITIONER
Payer: COMMERCIAL

## 2018-11-16 DIAGNOSIS — G47.33 OSA (OBSTRUCTIVE SLEEP APNEA): ICD-10-CM

## 2018-11-16 PROCEDURE — 95811 POLYSOM 6/>YRS CPAP 4/> PARM: CPT | Performed by: INTERNAL MEDICINE

## 2018-11-19 NOTE — PROCEDURES
Clinical Comments:  The patient underwent a CPAP titration using the standard montage for measurement of parameters of sleep, respiratory events, movement abnormalities, heart rate and rhythm. A microphone was used to monitor snoring.      INTERPRETATION:  Testing began at 9:57:07 PM.  The total recording time was 477.0 minutes with a sleep period of 436.8 minutes and the total sleep time was 421.0 minutes with a sleep efficiency of 88.3%.  The sleep latency was 40.3 minutes, and REM latency was 95.0 minutes.  The patient experienced 29 arousals in total, for an arousal index of 4.1    RESPIRATORY: The patient had 15 apneas in total.  Of these, 2 were obstructive apneas, and 13 were central apneas.  This resulted in an apnea index (AI) of 2.1.  The patient had 101 hypopneas, for a hypopnea index of 14.4.  The overall AHI was 16.5, while the AHI during Stage R sleep was 4.0.  AHI while supine was 0.0.    OXIMETRY: Oxygen saturation monitoring showed a mean SpO2 of 92.9%, with a minimum oxygen saturation of 87.0%.  Oxygen saturations were less than or = 89% for 0.4 minutes of sleep time.    CARDIAC: The highest heart rate during the recording was 93.0 beats per minute.  The average heart rate during sleep was 66.2 bpm.    LIMB MOVEMENTS: There were a total of 3 PLMs during sleep, of which 0 were PLMs arousals.  This resulted in a PLMS index of 0.4.    CPAP was tried from 7 to 11cm H2O.  BIPAP was tried from 13/9 to 15/11cm H2O.    RECORDING TECHNIQUE:       After the scalp was prepared, gold plated electrodes were applied to the scalp according to the International 10-20 System. EEG (electroencephalogram) was continuously monitored from the O1-M2, O2-M1, C3-M2, C4-M1, F3-M2, and F4-M1.   EOGs (electrooculograms) were monitored by electrodes placed at the left and right outer canthi.  Chin EMG (electromyogram) was monitored by electrodes placed on the mentalis and sub-mentalis muscles.  Nasal and oral airflow were  monitored using a triple port thermocouple as well as oronasal pressure transducer.  Respiratory effort was measured by inductive plethysmography technology employing abdominal and thoracic belts.  Blood oxygen saturation and pulse were monitored by pulse oximetry.  Heart rhythm was monitored by surface electrocardiogram.  Leg EMG was studied using surface electrodes placed on left and right anterior tibialis.  A microphone was used to monitor tracheal sounds and snoring.  Body position was monitored and documented by technician observation      SUMMARY:    This was an overnight positive airway pressure titration polysomnogram.  The patient chose to use a small ResMed air fit F 20 mask and heated humidification.     During the diagnostic phase the total recording time was 477.0 minutes, the sleep period time was 436.8 minutes, and the total sleep time was 421.0 minutes.  The patient's sleep efficiency was 88.3 % which is nearly normal.  The patient experienced a normal 3 REM periods.    The sleep stage durations revealed 15.8 minutes of wake after sleep onset (WASO), 10.0 minutes of N1 sleep, 43.5 minutes of N2 sleep, 61.5 minutes of N3 sleep, and 106.0 minutes of REM.    The latency to sleep was 40.3 minutes which is prolonged.  The latency to REM was 95 minutes which is normal.  No significant sleep fragmentation occurred.  The arousal index was 4.1.  The Total Limb Movement (isolated) Index was 0.4, the Limb Movement with Arousal Index was 0, and the PLM Series Index was 0.    The patient experienced 15 apneas and 101 hypopneas and 0 RERAS.  The apnea hypopnea index was 16.5, the RDI was 16.5, the mean event duration was 16.1 seconds, and the longest event lasted 22.6 seconds.  The REM index was 4.0 and the supine index was not applicable as the patient did not experience any events in the supine position.  The apnea hypopnea index represents moderate sleep apnea syndrome during the Pap titration.    The cassidy  saturation during sleep was 87 % and the patient spent 1.2 % of the recording with saturations less than or equal to 90%.    During sleep, the minimum heart rate was artifactual,  the mean heart rate was 66 bpm, and the maximum heart rate was 93 bpm.    The technician initiated treatment with CPAP set at 7 cmH2O and increased the pressure to a maximum of 11 cmH2O then transitioned the patient to bilevel for continued events and hypoxemia.    The patient did best on bilevel 15/11 cm water with a resultant AHI of 0.0, a minimum saturation of 91 %, and a mean saturation of 92.7 %.  The patient did fairly well on another bilevel pressures as well.  On bilevel 15/11 cm water the patient did not achieve REM or supine sleep.    ASSESSMENT:    Successful bilevel titration to a final pressure of bilevel 15/11 cm water with a resultant AHI of 0.0, minimum saturation of 91%, and mean saturation of 92.7%, but no supine or REM sleep.        RECOMMENDATION:    Recommend bilevel 15/11 cmH2O using a mask of choice and heated humidification followed by data card and clinical review in 6-8 weeks.  The patient successfully used a small ResMed air fit F 20 mask with heated humidification during the positive airway pressure titration.

## 2018-11-26 ENCOUNTER — OFFICE VISIT (OUTPATIENT)
Dept: BEHAVIORAL HEALTH | Facility: CLINIC | Age: 38
End: 2018-11-26
Payer: COMMERCIAL

## 2018-11-26 DIAGNOSIS — Z63.4 BEREAVEMENT: ICD-10-CM

## 2018-11-26 DIAGNOSIS — F41.8 DEPRESSION WITH ANXIETY: ICD-10-CM

## 2018-11-26 PROCEDURE — 90834 PSYTX W PT 45 MINUTES: CPT | Performed by: MARRIAGE & FAMILY THERAPIST

## 2018-11-26 SDOH — SOCIAL STABILITY - SOCIAL INSECURITY: DISSAPEARANCE AND DEATH OF FAMILY MEMBER: Z63.4

## 2018-11-26 NOTE — BH THERAPY
" [facility]  Therapy Progress Note    Patient Name: Lily Vasquez  Patient MRN: [id number]  Today's Date: 11/26/2018     Type of session:Individual psychotherapy  Length of session: 50 minutes  Persons in attendance:Patient    Subjective/New Info: pt is grieving the death of her mother and the holidays are painful for pt dad got drunk and her siblings are unemotional    Objective/Observations:   Participation: Active verbal participation   Grooming: Casual   Cognition: Alert   Eye contact: Good   Mood: Depressed and Anxious   Affect: Full range   Thought process: Goal-directed   Speech: Rate within normal limits   Other:     Diagnoses: No diagnosis found.     Current risk:   SUICIDE: Not applicable   Homicide: Not applicable   Self-harm: Not applicable   Relapse: Not applicable   Other:    Safety Plan reviewed? Not Indicated   If evidence of imminent risk is present, intervention/plan:     Therapeutic Intervention(s): Clarify:  Clarify feelings and Clarify thoughts, Cognitive modification, Self-care skills, Stressors assessed and Supportive psychotherapy    Treatment Goal(s)/Objective(s) addressed: id pt triggers including holidays without her mom; clarified she is sad and feels different from her dad and siblings; using cognitive modification helped pt validate her feelings; focused pt on self care and cont with therapy and provided support for pt     Progress toward Treatment Goals: Mild improvement    Plan:  - \"Homework\" recommendation: be gentle with herself    ESTRELLA Chan  11/26/2018                                   "

## 2018-12-10 ENCOUNTER — OFFICE VISIT (OUTPATIENT)
Dept: HEALTH INFORMATION MANAGEMENT | Facility: MEDICAL CENTER | Age: 38
End: 2018-12-10
Payer: COMMERCIAL

## 2018-12-10 VITALS
BODY MASS INDEX: 31.32 KG/M2 | OXYGEN SATURATION: 97 % | HEART RATE: 84 BPM | DIASTOLIC BLOOD PRESSURE: 70 MMHG | SYSTOLIC BLOOD PRESSURE: 122 MMHG | HEIGHT: 62 IN | WEIGHT: 170.2 LBS

## 2018-12-10 DIAGNOSIS — K76.0 FATTY LIVER: ICD-10-CM

## 2018-12-10 DIAGNOSIS — E55.9 VITAMIN D INSUFFICIENCY: ICD-10-CM

## 2018-12-10 DIAGNOSIS — E28.2 PCOS (POLYCYSTIC OVARIAN SYNDROME): ICD-10-CM

## 2018-12-10 DIAGNOSIS — E66.9 OBESITY (BMI 30-39.9): ICD-10-CM

## 2018-12-10 DIAGNOSIS — G47.9 SLEEPING DIFFICULTY: ICD-10-CM

## 2018-12-10 DIAGNOSIS — G47.31 CENTRAL SLEEP APNEA: ICD-10-CM

## 2018-12-10 PROCEDURE — 99214 OFFICE O/P EST MOD 30 MIN: CPT | Performed by: INTERNAL MEDICINE

## 2018-12-10 PROCEDURE — 97803 MED NUTRITION INDIV SUBSEQ: CPT | Performed by: DIETITIAN, REGISTERED

## 2018-12-10 NOTE — PROGRESS NOTES
Bariatric Medicine Follow Up  Chief Complaint   Patient presents with   • Weight Gain       History of Present Illness:   Lily Vasquez is a 38 y.o. female who presents for weight management follow-up and to help address co-morbidities related to overweight, including JAN, HLD/TG, fatty liver.    During the patient's last visit, the following were discussed and recommended:  Weight Goal: 3-5% wt loss each month (pt goal weight is 135 lb)  Diet: High Protein/Low Carb Meals and 2 snacks between meals daily  2 Premier protein breakfast, lunch daily  64+ oz water per day  Avoid sweet drinks and sodas  Track daily intake if possible and time permits  Physical Activity:  Resume gym workouts, more workouts at work, resume fit bit tracking of steps  Risk level for moderate/vigorous exercise program: Low  New Rx: Defers wt loss meds but given info on three to consider if wt loss plateau  Behavior change: Continue behavioral health counseling  Follow-up: 2 months    Stress level down, making more mindful choices, binge eating less.  Work less busy, calming down.  Counseling helping with stress reduction a lot!    Using PP in am consistently, sometimes lunch or dinner.  Following plan 70% of time.  Needs to get back to exercise, cannot get to gym.  Not ready for antiobesity meds, afraid how she will feel and if she will get GI upset.    Sleep stable, still not great.   Just had sleep study, f/u due next week.  May be changing to bipap. Last lipids 7/2018 normal without statin.  No recent LFTs.  Taking MVI not Vit D, not taking iron.  Confirms metformin helped her in the past when she was treated for PCO S.    Exercise:   None  Yoga provided free through work once per wk  Walked to [facility] [employer] today  Found gym near her home, to find out about membership     Review of Systems   Losing a lot of hair  All other ROS were reviewed and are otherwise unchanged from my previous visit with patient.    Physical Exam:    /70 (BP Location:  "Right arm, Patient Position: Sitting, BP Cuff Size: Adult)   Pulse 84   Ht 1.575 m (5' 2\")   Wt 77.2 kg (170 lb 3.2 oz)   LMP 06/07/2018   SpO2 97%   BMI 31.13 kg/m²   Waist: 37.75 in  Body fat % 41  REE 1473 kcal/day    Weight change since last visit: 0 lb (-18.6 lb total)  Waist Circum change since last visit: 0 in (-4 in total)    Constitutional: Oriented to person, place, and time and well-developed, well-nourished, and in no distress.    Head: Normocephalic.   Musculoskeletal: Normal range of motion. No edema.   Neurological: Alert and oriented to person, place, and time. No muscle weakness.  Gait normal.   Skin: Warm and dry. Not diaphoretic.   Psychiatric: Mood, memory, affect and judgment normal.     Laboratory:   None new.      Dietitian Assessment: I have reviewed the Dietitian's assessment related to this encounter.       ASSESSMENT/PLAN:  Body mass index is 31.13 kg/m².    Obesity Stage ([place]):  2; Class 1    1. Sleeping difficulty     2. Central sleep apnea     3. PCOS (polycystic ovarian syndrome)  metFORMIN (GLUCOPHAGE) 500 MG Tab   4. Vitamin D insufficiency     5. Fatty liver     6. Obesity (BMI 30-39.9)       Behaviorally, the patient is responding well to counseling, with stress level  improving.  In turn, this helped stabilize her weight, appears to be binge eating less.  Sleep evaluation underway.  Start metformin, given PCOS, may help with appetite suppression.  Monitor vitamin D levels off vitamin D.  Monitor LFTs with further weight loss.    The patient and I have discussed at length and agree to the following recommendations, which are all addressing the above diagnoses:    Weight Goal: 3-5% wt loss each month (pt goal weight is 135 lb)  Diet: PP am, sometimes for lunch or dinner.  Physical Activity:  Start gym near her home, yoga once per wk at work, walking when she can  Wants to try jogging/running!  Risk level for moderate/vigorous exercise program: low  New Rx: Does not feel " ready for antiobesity meds  Restart metformin as she has tolerated in past, titrate up as tolerated  Behavior change: continue counseling  Follow-up: one month    Patient's body mass index is 31.13 kg/m². Exercise and nutrition counseling were performed at this visit.

## 2018-12-11 ENCOUNTER — APPOINTMENT (OUTPATIENT)
Dept: BEHAVIORAL HEALTH | Facility: CLINIC | Age: 38
End: 2018-12-11
Payer: COMMERCIAL

## 2018-12-20 ENCOUNTER — SLEEP CENTER VISIT (OUTPATIENT)
Dept: SLEEP MEDICINE | Facility: MEDICAL CENTER | Age: 38
End: 2018-12-20
Payer: COMMERCIAL

## 2018-12-20 VITALS
OXYGEN SATURATION: 97 % | HEART RATE: 67 BPM | RESPIRATION RATE: 16 BRPM | BODY MASS INDEX: 31.83 KG/M2 | WEIGHT: 173 LBS | SYSTOLIC BLOOD PRESSURE: 116 MMHG | HEIGHT: 62 IN | DIASTOLIC BLOOD PRESSURE: 62 MMHG

## 2018-12-20 DIAGNOSIS — G47.10 HYPERSOMNOLENCE: ICD-10-CM

## 2018-12-20 DIAGNOSIS — G47.31 COMPLEX SLEEP APNEA SYNDROME: ICD-10-CM

## 2018-12-20 DIAGNOSIS — F51.04 CHRONIC INSOMNIA: ICD-10-CM

## 2018-12-20 DIAGNOSIS — G25.81 RESTLESS LEG SYNDROME: ICD-10-CM

## 2018-12-20 PROCEDURE — 99214 OFFICE O/P EST MOD 30 MIN: CPT | Performed by: INTERNAL MEDICINE

## 2018-12-20 RX ORDER — ZOLPIDEM TARTRATE 5 MG/1
TABLET ORAL
Refills: 0 | COMMUNITY
Start: 2018-10-22 | End: 2018-12-10

## 2018-12-20 NOTE — PROGRESS NOTES
CC: Sleep apnea hypopnea syndrome.    HPI:   Ms. Vasquez was evaluated here in May for snoring and excessive daytime somnolence.  A split-night polysomnogram on June 9, 2018 demonstrated an apnea hypotony index of 29.6 events per hour and a lowest arterial oxygen saturation of 86% on room air.  There was a good response to CPAP at 7 cm of water pressure with some treatment onset central apneas but suppression of hypopnea events and preservation of arterial oxygen saturation.    Response to CPAP at 7 cm of water was suboptimal.  When she was seen on October 18, 2018 she noted persistent fatigue and daytime somnolence.  The CPAP data recording chip suggested residual AHI of 29.6 events per hour.  She returns today to review the results of a titration polysomnogram.    That study on November 16, 2018 is reviewed with the patient.  It demonstrates mild fragmentation of sleep related to increased sleep onset latency.  There are only rare periodic limb movements without arousal.  CPAP was applied at 7-11 cm of water with persistence of hypopnea and central apnea episodes.  BiPAP was then applied at 13-15/9-11 cm water pressure.  In the final pressure stage, the apnea hypotony index was 0 with a minimum arterial oxygen saturation of 91% but no REM sleep time was included in that stage and no supine sleep time was recorded.  In the penultimate pressure stage there was a single episode of hypopnea and 5 episodes of central apnea and states that included REM sleep time.        Patient Active Problem List    Diagnosis Date Noted   • Reactive depression 11/12/2018     Priority: Medium     Class: Chronic   • Partner relationship problem 11/12/2018     Priority: Medium     Class: Chronic   • Anxiety 09/05/2018     Priority: Medium     Class: Acute   • Bereavement 07/10/2018     Priority: Medium     Class: Chronic   • Eating disorder 07/10/2018     Priority: Low     Class: Chronic   • Obesity (BMI 30-39.9) 12/10/2018   • Sore throat  08/31/2018   • Central sleep apnea 06/15/2018   • Low ferritin level 06/15/2018   • RLS (restless legs syndrome) 05/23/2018   • Chronic insomnia 05/23/2018   • Pharyngitis 03/07/2018   • Weight gain 01/24/2018   • Sleeping difficulty 01/24/2018   • Hiatal hernia 01/03/2018   • Obesity (BMI 30.0-34.9) 01/03/2018   • PCOS (polycystic ovarian syndrome) 01/03/2018   • Hypertriglyceridemia 01/03/2018   • Labor and delivery, indication for care 06/07/2014   • TMJ (temporomandibular joint syndrome)    • Vitamin D insufficiency 12/07/2010   • Preventative health care 12/07/2010   • Fatty liver    • GERD (gastroesophageal reflux disease) 09/09/2009   • Hemorrhoid 09/09/2009       Past Medical History:   Diagnosis Date   • Anesthesia     Severe PONV   • Arthritis     TMJ   • Bronchitis    • Chickenpox    • Dental disorder     permanent retainer   • Eating disorder 7/10/2018   • Fatty liver    • GERD (gastroesophageal reflux disease)    • Gestational diabetes    • Hemorrhoids    • Hiatus hernia syndrome    • Indigestion     GERD   • Influenza    • Neck pain    • Obesity    • Vitamin D deficiency        Past Surgical History:   Procedure Laterality Date   • LISBET BY LAPAROSCOPY  12/18/2012    Performed by Idania Camara M.D. at SURGERY [facility]   • LARYNGOSCOPY  7/6/2011    Performed by EDNA MINA at SURGERY SAME DAY [facility]   • ESOPHAGOSCOPY  7/6/2011    Performed by EDNA MINA at SURGERY SAME DAY [facility]   • TONSILLECTOMY AND ADENOIDECTOMY  1991   • OTHER  1991    TONSILLECTOMY    • CHOLECYSTECTOMY         Family History   Problem Relation Age of Onset   • Arthritis Mother    • Hypertension Mother    • Hyperlipidemia Mother    • Sleep Apnea Mother    • Psychiatry Father    • Alcohol abuse Father    • Thyroid Sister    • Heart Disease Maternal Grandmother    • Heart Disease Maternal Grandfather    • Heart Disease Paternal Grandmother    • Diabetes Paternal Grandmother    • Cancer Paternal  "Grandfather         brain   • Stroke Unknown        Social History     Social History   • Marital status:      Spouse name: N/A   • Number of children: N/A   • Years of education: N/A     Occupational History   • Not on file.     Social History Main Topics   • Smoking status: Never Smoker   • Smokeless tobacco: Never Used   • Alcohol use No   • Drug use: No   • Sexual activity: Yes     Partners: Male      Comment: , works at [employer]     Other Topics Concern   • Not on file     Social History Narrative   • No narrative on file       Current Outpatient Prescriptions   Medication Sig Dispense Refill   • metFORMIN (GLUCOPHAGE) 500 MG Tab Take 1 Tab by mouth every day. 30 Tab 2   • Multiple Vitamin (MULTIVITAMINS PO) Take  by mouth.     • calcium carbonate (TUMS) 500 MG Chew Tab Take 500 mg by mouth every day.     • ibuprofen (MOTRIN) 200 MG Tab Take 200 mg by mouth every 6 hours as needed.       No current facility-administered medications for this visit.     \"CURRENT RX\"      Allergies: Codeine; Morphine; Sulfa drugs; and Tape      ROS  Unchanged from prior.      Physical Exam:   /62 (BP Location: Right arm, Patient Position: Sitting, BP Cuff Size: Adult)   Pulse 67   Resp 16   Ht 1.575 m (5' 2\")   Wt 78.5 kg (173 lb)   LMP 06/07/2018   SpO2 97%   BMI 31.64 kg/m²    Head and neck examination demonstrates no mucosal lesion, purulent drainage or evident polyps. The pharynx is benign with a Mallampati III presentation. The neck is supple without thyromegaly. On chest examination there are symmetrical bilateral breath sounds without rales, wheezing or consolidation. On cardiac examination, the apical impulse and heart sounds are normal and the rhythm is regular. There is no murmur, gallop or rub and no jugular venous distention. The abdomen is soft with active bowel sounds and no palpable hepatosplenomegaly, mass, guarding or rebound. The extremities show no clubbing, cyanosis or edema " and no signs of deep venous thrombosis. There is no warmth, redness, tenderness or palpable venous cord in the calves. The skin is clear, warm and dry. There is no unusual peripheral lymphadenopathy. Peripheral pulses are palpable in all 4 extremities. On neurologic examination, cranial nerve function is intact, motor tone is symmetrical, and the patient is alert, oriented and responsive.       Problems:  1. Complex sleep apnea syndrome  She has severe sleep apnea hypopnea with an apnea hypotony index of 29.6 events per hour and the lowest arterial oxygen saturation of 86% on room air.  Treatment with CPAP was unsuccessful and the titration studies documented the evolution of persisting central apnea episodes on CPAP, consistent with complex sleep apnea.  She did well on BiPAP using somewhat higher pressures with effective suppression of both central and obstructive episodes and preservation of arterial oxygen saturation.  Effective treatment is required only to improve levels of daytime alertness but also to reduce the cardiac and neurologic risks associated with untreated sleep apnea hypopnea.    2. Hypersomnolence  Related to the sleep disordered breathing.    3. Chronic insomnia  This will be reassessed after her sleep disordered breathing is been effectively treated.  She may benefit from cognitive behavioral therapy.    4. Restless leg syndrome  She remains on oral iron supplements with a follow-up serum ferritin assay pending.  Her polysomnogram did not suggest a problem with periodic limb movements during sleep.      Plan:   1.  Change treatment to BiPAP at 15/11 cm water pressure.  She did best with a small air fit F 20 full facemask.  We have talked about acclimating to the BiPAP with proper mask fit, effective humidification and consistent use.    2.  Continue oral iron supplements with a follow-up serum ferritin level.    3.  Return visit here about 2 months after starting BiPAP, bringing the data  recording chip with her.  She may drop into the technician clinic at any time for assistance.    We appreciate the opportunity to assist in her care.  Return in about 3 months (around 3/20/2019).

## 2018-12-26 ENCOUNTER — OFFICE VISIT (OUTPATIENT)
Dept: BEHAVIORAL HEALTH | Facility: CLINIC | Age: 38
End: 2018-12-26
Payer: COMMERCIAL

## 2018-12-26 DIAGNOSIS — F41.8 ANXIETY WITH DEPRESSION: ICD-10-CM

## 2018-12-26 PROCEDURE — 90834 PSYTX W PT 45 MINUTES: CPT | Performed by: MARRIAGE & FAMILY THERAPIST

## 2018-12-26 NOTE — BH THERAPY
" [facility]  Therapy Progress Note    Patient Name: Lily Vasquez  Patient MRN: [id number]  Today's Date: 12/26/2018     Type of session:Individual psychotherapy  Length of session: 45 minutes  Persons in attendance:Patient    Subjective/New Info: pt had first marcello without her mother. Pt said siblings argued and gave unsolicited advice and father was drunk and pt felt alone    Objective/Observations:   Participation: Active verbal participation   Grooming: Casual   Cognition: Alert   Eye contact: Good   Mood: Depressed and Anxious   Affect: Sad and Tearful   Thought process: Goal-directed   Speech: Rate within normal limits   Other:     Diagnoses: No diagnosis found.     Current risk:   SUICIDE: Low   Homicide: Not applicable   Self-harm: Not applicable   Relapse: Lowfood   Other:    Safety Plan reviewed? No   If evidence of imminent risk is present, intervention/plan:     Therapeutic Intervention(s): Clarify:  Clarify feelings and Clarify thoughts, Cognitive modification, Maladaptive behavior addressed, Positive behavior reinforced, Role-playing, Self-care skills, Stressors assessed and Supportive psychotherapy    Treatment Goal(s)/Objective(s) addressed: id pt triggers including being with the family and her mother was not there; clarified pt's feelings of lonliness, depression and anxiety; addressed maladaptive behavior including guarding her heart from her H; reinforced positive behavior including willingness to tell her H what she wants; using cognitive modification and role playing helped pt validate her little girls inside her heart's feelings, needs and wants; focused pt on self care and provide support for pt      Progress toward Treatment Goals: Mild improvement    Plan:  - \"Homework\" recommendation: cont to nurture herself using inner child dialogue    ESTRELLA Chan  12/26/2018                                   "

## 2019-01-02 ENCOUNTER — OFFICE VISIT (OUTPATIENT)
Dept: MEDICAL GROUP | Facility: LAB | Age: 39
End: 2019-01-02
Payer: COMMERCIAL

## 2019-01-02 VITALS
SYSTOLIC BLOOD PRESSURE: 104 MMHG | WEIGHT: 171.8 LBS | HEART RATE: 78 BPM | HEIGHT: 62 IN | TEMPERATURE: 98.1 F | BODY MASS INDEX: 31.62 KG/M2 | OXYGEN SATURATION: 98 % | DIASTOLIC BLOOD PRESSURE: 72 MMHG | RESPIRATION RATE: 18 BRPM

## 2019-01-02 DIAGNOSIS — J02.9 PHARYNGITIS, UNSPECIFIED ETIOLOGY: ICD-10-CM

## 2019-01-02 LAB
FLUAV+FLUBV AG SPEC QL IA: NEGATIVE
INT CON NEG: NEGATIVE
INT CON NEG: NEGATIVE
INT CON POS: POSITIVE
INT CON POS: POSITIVE
S PYO AG THROAT QL: NEGATIVE

## 2019-01-02 PROCEDURE — 87880 STREP A ASSAY W/OPTIC: CPT | Performed by: INTERNAL MEDICINE

## 2019-01-02 PROCEDURE — 99213 OFFICE O/P EST LOW 20 MIN: CPT | Performed by: INTERNAL MEDICINE

## 2019-01-02 PROCEDURE — 87804 INFLUENZA ASSAY W/OPTIC: CPT | Performed by: INTERNAL MEDICINE

## 2019-01-02 ASSESSMENT — PATIENT HEALTH QUESTIONNAIRE - PHQ9
6. FEELING BAD ABOUT YOURSELF - OR THAT YOU ARE A FAILURE OR HAVE LET YOURSELF OR YOUR FAMILY DOWN: SEVERAL DAYS
SUM OF ALL RESPONSES TO PHQ9 QUESTIONS 1 AND 2: 1
4. FEELING TIRED OR HAVING LITTLE ENERGY: SEVERAL DAYS
7. TROUBLE CONCENTRATING ON THINGS, SUCH AS READING THE NEWSPAPER OR WATCHING TELEVISION: NOT AT ALL
2. FEELING DOWN, DEPRESSED, IRRITABLE, OR HOPELESS: SEVERAL DAYS
SUM OF ALL RESPONSES TO PHQ QUESTIONS 1-9: 6
3. TROUBLE FALLING OR STAYING ASLEEP OR SLEEPING TOO MUCH: MORE THAN HALF THE DAYS
9. THOUGHTS THAT YOU WOULD BE BETTER OFF DEAD, OR OF HURTING YOURSELF: NOT AT ALL
1. LITTLE INTEREST OR PLEASURE IN DOING THINGS: NOT AT ALL
5. POOR APPETITE OR OVEREATING: SEVERAL DAYS
8. MOVING OR SPEAKING SO SLOWLY THAT OTHER PEOPLE COULD HAVE NOTICED. OR THE OPPOSITE, BEING SO FIGETY OR RESTLESS THAT YOU HAVE BEEN MOVING AROUND A LOT MORE THAN USUAL: NOT AT ALL

## 2019-01-02 NOTE — LETTER
January 2, 2019    To Whom It May Concern:         This is confirmation that Lily Idalia Vasquez attended her scheduled appointment with Jenelle Malik M.D. on 1/02/19.        Please excuse her from work for 2 days to hasten her recovery.         If you have any questions please do not hesitate to call me at the phone number listed below.    Sincerely,          Jenelle Malik M.D.  [phone]

## 2019-01-02 NOTE — PROGRESS NOTES
Chief Complaint   Patient presents with   • Pharyngitis     Since Sunday Night   • Nasal Congestion       Subjective:     HPI:   Lily presents today with the following.    Pharyngitis  New to discuss, uncontrolled problem.  Her symptoms started about 3 days ago on Sunday night with a sore throat that continued to worsen with difficulty swallowing and talking.  She also has nasal stuffiness/congestion but no actual sinus pain.  She does feel bilateral ear pain when she swallows.  She noted a tender enlarged lymph nodes in her neck that continued to worsen.  Denies any fever or chills.  Denies chest pain, shortness of breath, cough, or wheezing.  Denies impaired hearing or vision problems.  Reported one sick contact at work.  Had her flu shot this season already.  Denies myalgias, nausea, vomiting or diarrhea.        Patient Active Problem List    Diagnosis Date Noted   • Reactive depression 11/12/2018     Priority: Medium     Class: Chronic   • Partner relationship problem 11/12/2018     Priority: Medium     Class: Chronic   • Anxiety 09/05/2018     Priority: Medium     Class: Acute   • Bereavement 07/10/2018     Priority: Medium     Class: Chronic   • Eating disorder 07/10/2018     Priority: Low     Class: Chronic   • Obesity (BMI 30-39.9) 12/10/2018   • Sore throat 08/31/2018   • Central sleep apnea 06/15/2018   • Low ferritin level 06/15/2018   • RLS (restless legs syndrome) 05/23/2018   • Chronic insomnia 05/23/2018   • Pharyngitis 03/07/2018   • Weight gain 01/24/2018   • Sleeping difficulty 01/24/2018   • Hiatal hernia 01/03/2018   • Obesity (BMI 30.0-34.9) 01/03/2018   • PCOS (polycystic ovarian syndrome) 01/03/2018   • Hypertriglyceridemia 01/03/2018   • Labor and delivery, indication for care 06/07/2014   • TMJ (temporomandibular joint syndrome)    • Vitamin D insufficiency 12/07/2010   • Preventative health care 12/07/2010   • Fatty liver    • GERD (gastroesophageal reflux disease) 09/09/2009   •  Hemorrhoid 09/09/2009       Current Outpatient Prescriptions   Medication Sig Dispense Refill   • Multiple Vitamin (MULTIVITAMINS PO) Take  by mouth.     • calcium carbonate (TUMS) 500 MG Chew Tab Take 500 mg by mouth every day.     • metFORMIN (GLUCOPHAGE) 500 MG Tab Take 1 Tab by mouth every day. (Patient not taking: Reported on 1/2/2019) 30 Tab 2   • ibuprofen (MOTRIN) 200 MG Tab Take 200 mg by mouth every 6 hours as needed.       No current facility-administered medications for this visit.        Allergies as of 01/02/2019 - Reviewed 01/02/2019   Allergen Reaction Noted   • Codeine Vomiting 01/02/2009   • Morphine Vomiting 06/28/2011   • Sulfa drugs Vomiting 06/28/2011   • Tape Contact Dermatitis 06/28/2011        Past Medical History:   Diagnosis Date   • Anesthesia     Severe PONV   • Arthritis     TMJ   • Bronchitis    • Chickenpox    • Dental disorder     permanent retainer   • Eating disorder 7/10/2018   • Fatty liver    • GERD (gastroesophageal reflux disease)    • Gestational diabetes    • Hemorrhoids    • Hiatus hernia syndrome    • Indigestion     GERD   • Influenza    • Neck pain    • Obesity    • Vitamin D deficiency        Past Surgical History:   Procedure Laterality Date   • LISBET BY LAPAROSCOPY  12/18/2012    Performed by Idania Camara M.D. at SURGERY [facility]   • LARYNGOSCOPY  7/6/2011    Performed by EDNA MINA at SURGERY SAME DAY [facility]   • ESOPHAGOSCOPY  7/6/2011    Performed by EDNA MINA at SURGERY SAME DAY [facility]   • TONSILLECTOMY AND ADENOIDECTOMY  1991   • OTHER  1991    TONSILLECTOMY    • CHOLECYSTECTOMY         Social History   Substance Use Topics   • Smoking status: Never Smoker   • Smokeless tobacco: Never Used   • Alcohol use No       Family History   Problem Relation Age of Onset   • Arthritis Mother    • Hypertension Mother    • Hyperlipidemia Mother    • Sleep Apnea Mother    • Psychiatry Father    • Alcohol abuse Father    • Thyroid Sister    •  "Heart Disease Maternal Grandmother    • Heart Disease Maternal Grandfather    • Heart Disease Paternal Grandmother    • Diabetes Paternal Grandmother    • Cancer Paternal Grandfather         brain   • Stroke Unknown        ROS:     - Constitutional: Negative for fever, chills, unexpected weight change, and fatigue/generalized weakness.     - HEENT: Per HPI.    - Respiratory: Negative for cough, sputum production, dyspnea and wheezing.    - Cardiovascular: Negative for chest pain or palpitations.      - Gastrointestinal: Negative for heartburn, nausea, vomiting, abdominal pain, diarrhea or constipation.     - Genitourinary: Negative for dysuria, polyuria or urinary urgency.    - Musculoskeletal: Negative for myalgias, back pain, and joint pain.     - Skin: Negative for rash, itching, cyanotic skin color change.     - Psychiatric/Behavioral: Negative for depression or suicidal/homicidal ideation.       Physical Exam:     Blood pressure 104/72, pulse 78, temperature 36.7 °C (98.1 °F), temperature source Temporal, resp. rate 18, height 1.575 m (5' 2\"), weight 77.9 kg (171 lb 12.8 oz), last menstrual period 06/07/2018, SpO2 98 %, not currently breastfeeding. Body mass index is 31.42 kg/m².   Gen:         Alert and oriented, No apparent distress.  HEENT: Eyes conjunctiva is clear, lids without ptosis, pupils equal round and reactive to light and accommodation.  Ears normal shape and contour, canals are clear bilaterally, TMs with good light reflex and appear normal.  Nasal mucosa pale and edematous with clear rhinorrhea.    Oropharynx diffuse pharyngeal erythema, edema but no exudates.  Sinuses (frontal and maxillary) nontender to palpation.  Neck:        + Positive anterior cervical and submandibular bilateral tender lymphadenopathy.  Lungs:     Clear to auscultation bilaterally  CV:          Regular rate and rhythm. No murmurs, rubs or gallops.               Ext:          No clubbing, cyanosis, edema.      Assessment " and Plan:     38 y.o. female with the following issues.    1. Pharyngitis, unspecified etiology  New, uncontrolled.  Likely viral in etiology, rapid strep and influenza test in the office were both negative.  Recommended supportive care, over-the-counter NSAIDs, hydration and home remedies.  Hand hygiene.    - POCT Rapid Strep A  - POCT Influenza A/B      Follow Up:      No Follow-up on file.      Please note that this dictation was created using voice recognition software. I have made every reasonable attempt to correct obvious errors, but I expect that there are errors of grammar and possibly content that I did not discover before finalizing the note.    Signed by: Jenelle Malik M.D.

## 2019-01-02 NOTE — ASSESSMENT & PLAN NOTE
New to discuss, uncontrolled problem.  Her symptoms started about 3 days ago on Sunday night with a sore throat that continued to worsen with difficulty swallowing and talking.  She also has nasal stuffiness/congestion but no actual sinus pain.  She does feel bilateral ear pain when she swallows.  She noted a tender enlarged lymph nodes in her neck that continued to worsen.  Denies any fever or chills.  Denies chest pain, shortness of breath, cough, or wheezing.  Denies impaired hearing or vision problems.  Reported one sick contact at work.  Had her flu shot this season already.  Denies myalgias, nausea, vomiting or diarrhea.

## 2019-01-03 DIAGNOSIS — E28.2 PCOS (POLYCYSTIC OVARIAN SYNDROME): ICD-10-CM

## 2019-01-03 NOTE — TELEPHONE ENCOUNTER
Was the patient seen in the last year in this department? Yes    Does patient have an active prescription for medications requested? No     Received Request Via: Pharmacy     **90 day supply requested**

## 2019-01-16 ENCOUNTER — OFFICE VISIT (OUTPATIENT)
Dept: BEHAVIORAL HEALTH | Facility: CLINIC | Age: 39
End: 2019-01-16
Payer: COMMERCIAL

## 2019-01-16 DIAGNOSIS — F41.8 DEPRESSION WITH ANXIETY: ICD-10-CM

## 2019-01-16 DIAGNOSIS — Z63.4 BEREAVEMENT: ICD-10-CM

## 2019-01-16 PROCEDURE — 90834 PSYTX W PT 45 MINUTES: CPT | Performed by: MARRIAGE & FAMILY THERAPIST

## 2019-01-16 SDOH — SOCIAL STABILITY - SOCIAL INSECURITY: DISSAPEARANCE AND DEATH OF FAMILY MEMBER: Z63.4
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
## 2019-02-06 ENCOUNTER — OFFICE VISIT (OUTPATIENT)
Dept: BEHAVIORAL HEALTH | Facility: CLINIC | Age: 39
End: 2019-02-06
Payer: COMMERCIAL

## 2019-02-06 DIAGNOSIS — Z63.4 BEREAVEMENT: ICD-10-CM

## 2019-02-06 DIAGNOSIS — F41.8 DEPRESSION WITH ANXIETY: ICD-10-CM

## 2019-02-06 PROCEDURE — 90834 PSYTX W PT 45 MINUTES: CPT | Performed by: MARRIAGE & FAMILY THERAPIST

## 2019-02-06 SDOH — SOCIAL STABILITY - SOCIAL INSECURITY: DISSAPEARANCE AND DEATH OF FAMILY MEMBER: Z63.4

## 2019-02-06 NOTE — BH THERAPY
" [facility]  Therapy Progress Note    Patient Name: Lily Vasquez  Patient MRN: [id number]  Today's Date: 2/6/2019     Type of session:Individual psychotherapy  Length of session: 50  Persons in attendance:Patient    Subjective/New Info: pt wants to distance herself from her family because they are critical, blaming and judgmental and that brings her mood down    Objective/Observations:   Participation: Active verbal participation   Grooming: Casual   Cognition: Alert   Eye contact: Good   Mood: Depressed and Anxious   Affect: Full range   Thought process: Goal-directed   Speech: Rate within normal limits and Volume within normal limits   Other:     Diagnoses: No diagnosis found.     Current risk:   SUICIDE: Not applicable   Homicide: Not applicable   Self-harm: Not applicable   Relapse: Lowfood   Other:    Safety Plan reviewed? No   If evidence of imminent risk is present, intervention/plan:     Therapeutic Intervention(s): Clarify:  Clarify feelings and Clarify thoughts, Cognitive modification, Parenting/familial roles addressed, Positive behavior reinforced, Self-care skills, Stressors assessed and Supportive psychotherapy    Treatment Goal(s)/Objective(s) addressed: id pt triggers including being around her bio family members that are very negative and judgmental, clarified pt feels hurt and angry when she has been around her dad and her siblings, identified pt's role as the youngest in the family has been to be picked on, using cognitive modification helped pt relinquish her role as the whipping post, reinforced pt's positive behjaviors including declining when she is invited to be around her siblings, encouraged pt self care and provided support for pt     Progress toward Treatment Goals: Mild improvement    Plan:  - \"Homework\" recommendation: look up ceremonies and/or rituals the celebrate who she is    ESTRELLA Chan  2/6/2019                                   "

## 2019-02-07 ENCOUNTER — OFFICE VISIT (OUTPATIENT)
Dept: HEALTH INFORMATION MANAGEMENT | Facility: MEDICAL CENTER | Age: 39
End: 2019-02-07
Payer: COMMERCIAL

## 2019-02-07 VITALS
WEIGHT: 171.1 LBS | OXYGEN SATURATION: 97 % | HEIGHT: 62 IN | DIASTOLIC BLOOD PRESSURE: 70 MMHG | HEART RATE: 81 BPM | BODY MASS INDEX: 31.49 KG/M2 | SYSTOLIC BLOOD PRESSURE: 116 MMHG

## 2019-02-07 DIAGNOSIS — K76.0 FATTY LIVER: ICD-10-CM

## 2019-02-07 DIAGNOSIS — E28.2 PCOS (POLYCYSTIC OVARIAN SYNDROME): ICD-10-CM

## 2019-02-07 DIAGNOSIS — G47.31 CENTRAL SLEEP APNEA: ICD-10-CM

## 2019-02-07 DIAGNOSIS — F32.9 REACTIVE DEPRESSION: ICD-10-CM

## 2019-02-07 DIAGNOSIS — E66.9 OBESITY (BMI 30.0-34.9): ICD-10-CM

## 2019-02-07 DIAGNOSIS — E55.9 VITAMIN D INSUFFICIENCY: ICD-10-CM

## 2019-02-07 PROCEDURE — 97803 MED NUTRITION INDIV SUBSEQ: CPT | Performed by: DIETITIAN, REGISTERED

## 2019-02-07 PROCEDURE — 99214 OFFICE O/P EST MOD 30 MIN: CPT | Performed by: INTERNAL MEDICINE

## 2019-02-07 NOTE — PROGRESS NOTES
"Nutrition Reassess: Medical Weight Management   Today's date: 2/7/2019  Referring Provider: No ref. provider found      Time: in/out 8:21-8:34 pm   Visit#: 12    Subjective:  - feels at a plateau, wants to break through it   - move to [place] has altered her schedule, not able to workout at much  - just got back going to the gym once a week (used to go 3x/wk until the move), also yoga at lunch once a week or walking on lunch break (not always consistent if having a meeting), [employer] classes at home once a week   - snacking a lot in the afternoon when she goes to her dads house to  the kids (jelly beans, chips, cookies)    - no changes to her intake earlier in the day   - not tracking, but thinks restarting will help her     Anthropometrics/Objective  Today's weight:    Vitals:    02/07/19 0804   BP: 116/70   Weight: 77.6 kg (171 lb 1.6 oz)   Height: 1.575 m (5' 2\")     BMI:  Body mass index is 31.29 kg/m².  Starting weight/date 188.8lb (1/24/18)      Total weight change:  - 17.7 lbs           Meal Plan:   High protein low carb diet with 1 meal replacements per day     ReAssesment/Notes:  Lily thinks her snacking habits in the afternoon need to change in order for her to continue losing weight. She has been skipping her protein + veggies snacks and instead reaching for cookies, chips and jelly beans at her Dad's house when she goes to pick the kids up. We came up with a plan and she will try bringing a snack bar (provided her with list) for something still sweet, but higher in protein to have either right before or during this time (3pm). If this alone does not help, she will also have 1 oz protein + NS veggie around 5:00 (ex. carrots and humus) before dinner as she has been feeling very hungry in the afternoon. Adding an additional healthy snack will still be much less calories than she is getting reaching for junk food.     Lily also is considering ways she can increase her weekly activity. She will " be doing a 5 days trial at the local [facility] (has rock wall, basketball court, &  for the kids) in hopes that the entire family can go in the evenings and she can workout while her  plays with the kids. Lily plans to restart her food journal and Dr. Varela will also be increasing her Metformin dose on today's visit.     Follow-up: 6 weeks DAVID/MD

## 2019-02-07 NOTE — PROGRESS NOTES
"Bariatric Medicine Follow Up  Chief Complaint   Patient presents with   • Weight Gain       History of Present Illness:   Lily Vasquez is a 38 y.o. female who presents for weight management follow-up and to help address co-morbidities related to overweight, including PCOS, JAN, VDD.    During the patient's last visit, the following were discussed and recommended:  Weight Goal: 3-5% wt loss each month (pt goal weight is 135 lb)  Diet: PP am, sometimes for lunch or dinner.  Physical Activity:  Start gym near her home, yoga once per wk at work, walking when she can  Wants to try jogging/running!  Risk level for moderate/vigorous exercise program: low  New Rx: Does not feel ready for antiobesity meds  Restart metformin as she has tolerated in past, titrate up as tolerated  Behavior change: continue counseling  Follow-up: one month    Wants to start tracking again, see what she did to help with wt loss at the beginning of the program.  Had been eating the same food over and over so stopped tracking but now feels she needs to resume, not eating well some days.    The patient has continued following up with behavioral health.  Helping, not eating as much emotionally.  Also prescribed BiPAP about 6 weeks ago, waiting for mask to start using it.  Had been on iron for restless leg syndrome.  Continues on metformin for PCOS.    Exercise: Tracking!  Gym one/wk  Yoga one/wk  [employer] videos at home     Review of Systems   Insomnia without change, still fatigued.  Denies lightheaded.  Some bloating with meformin but no diarrhea.  All other ROS were reviewed and are otherwise unchanged from my previous visit with patient.    Physical Exam:    /70 (BP Location: Left arm, Patient Position: Sitting, BP Cuff Size: Adult)   Pulse 81   Ht 1.575 m (5' 2\")   Wt 77.6 kg (171 lb 1.6 oz)   LMP 06/07/2018   SpO2 97%   BMI 31.29 kg/m²      Weight change since last visit:  1 lb (-17 lb total)  Waist Circum change since last visit: "  0 in (-4 in total)    Constitutional: Oriented to person, place, and time and well-developed, well-nourished, and in no distress.    Head: Normocephalic.   Musculoskeletal: Normal range of motion. No edema.   Neurological: Alert and oriented to person, place, and time. No muscle weakness.  Gait normal.   Skin: Warm and dry. Not diaphoretic.   Psychiatric: Mood, memory, affect and judgment normal.     Laboratory:   Recent labs reviewed.      Dietitian Assessment: I have reviewed the Dietitian's assessment related to this encounter.       ASSESSMENT/PLAN:  Body mass index is 31.29 kg/m².    Obesity Stage ([place]): 2; Class 1    1. PCOS (polycystic ovarian syndrome)  metFORMIN (GLUCOPHAGE) 500 MG Tab   2. Obesity (BMI 30.0-34.9)     3. Vitamin D insufficiency     4. Central sleep apnea     5. Reactive depression       The patient appears to be responding to behavioral health counseling, wishes to get back on track now with recording her intake, increasing activity.  Increase also metformin, as appears to be helping with satiety.  Continue vitamin D repletion.  To start BiPAP for JAN soon, which should help with sleep, fatigue and depressive symptoms as well.  Patient defers anti-obesity medication other than metformin.    The patient and I have discussed at length and agree to the following recommendations, which are all addressing the above diagnoses:    Weight Goal: 3-5% wt loss each month (pt goal weight is 135 lb)  Diet: High Protein/Low Carb Meals and 2 snacks between meals daily  >100 g protein, <100 g total carbs daily  64+ oz water per day  Avoid sweet drinks and sodas, as she is doing  Track daily intake as she had done previously, bring in next visit  Physical Activity: Daily activity, including gym workouts, yoga or walking  Starting to record and track exercise  Risk level for moderate/vigorous exercise program: Low  New Rx: Increase metformin to twice daily  Behavior change: Resume tracking, increase  activity  Follow-up: 6 wks    Patient's body mass index is 31.29 kg/m². Exercise and nutrition counseling were performed at this visit.

## 2019-02-25 ENCOUNTER — OFFICE VISIT (OUTPATIENT)
Dept: MEDICAL GROUP | Facility: MEDICAL CENTER | Age: 39
End: 2019-02-25
Payer: COMMERCIAL

## 2019-02-25 VITALS
HEIGHT: 62 IN | DIASTOLIC BLOOD PRESSURE: 64 MMHG | BODY MASS INDEX: 31.47 KG/M2 | TEMPERATURE: 98.8 F | WEIGHT: 171 LBS | SYSTOLIC BLOOD PRESSURE: 100 MMHG | HEART RATE: 82 BPM | OXYGEN SATURATION: 95 %

## 2019-02-25 DIAGNOSIS — M77.11 LATERAL EPICONDYLITIS OF RIGHT ELBOW: ICD-10-CM

## 2019-02-25 PROCEDURE — 99214 OFFICE O/P EST MOD 30 MIN: CPT | Performed by: FAMILY MEDICINE

## 2019-02-25 NOTE — Clinical Note
LIDIA, I saw her today for tennis elbow, did a quick overview of her health maintenance topics and labs, and everything seems kosher.  But you may find that you want to order her more labs for her annual medical exam (which I had Sabrina schedule for you) in approximately 1.5-2months.

## 2019-02-25 NOTE — ASSESSMENT & PLAN NOTE
New problem for my medical evaluation, uncontrolled, patient states that over the last month to 1.5 months patient has been experiencing increased right lateral forearm muscular tenderness to movement and use as well as painful bone at the R olecranon.    Patient has had no blunt force trauma, has not fallen to this arm, does not play sports, has not increase or change exercise routines, however does work with a computer all day long, states that she uses her right hand to use the mouse, states that her workstation can be adjustable height, however that she uses multiple monitors.  Therefore, patient was asked to be more mindful of how she uses the mouse, to make smaller movements rather than big broad movements that can cause her to flex the elbow muscles.    Patient has trialed some ibuprofen 400 mg p.o. daily as needed, as well as icy hot.  However, patient has not taken ibuprofen on a regular, daily basis.  Additionally, patient has not used the icy hot to massage out her muscles.    ROS is negative for right upper extremity radicular pain/numbness, right hand paresthesias

## 2019-02-25 NOTE — PROGRESS NOTES
Subjective:   Chief Complaint/History of Present Illness:  Lily Vasquez is a 38 y.o. female established patient who presents today to discuss medical problems as listed below    The encounter diagnosis was Lateral epicondylitis of right elbow.    Lateral epicondylitis of right elbow  New problem for my medical evaluation, uncontrolled, patient states that over the last month to 1.5 months patient has been experiencing increased right lateral forearm muscular tenderness to movement and use as well as painful bone at the R olecranon.    Patient has had no blunt force trauma, has not fallen to this arm, does not play sports, has not increase or change exercise routines, however does work with a computer all day long, states that she uses her right hand to use the mouse, states that her workstation can be adjustable height, however that she uses multiple monitors.  Therefore, patient was asked to be more mindful of how she uses the mouse, to make smaller movements rather than big broad movements that can cause her to flex the elbow muscles.    Patient has trialed some ibuprofen 400 mg p.o. daily as needed, as well as icy hot.  However, patient has not taken ibuprofen on a regular, daily basis.  Additionally, patient has not used the icy hot to massage out her muscles.    ROS is negative for right upper extremity radicular pain/numbness, right hand paresthesias      Patient Active Problem List    Diagnosis Date Noted   • Reactive depression 11/12/2018     Priority: Medium     Class: Chronic   • Partner relationship problem 11/12/2018     Priority: Medium     Class: Chronic   • Anxiety 09/05/2018     Priority: Medium     Class: Acute   • Bereavement 07/10/2018     Priority: Medium     Class: Chronic   • Eating disorder 07/10/2018     Priority: Low     Class: Chronic   • Lateral epicondylitis of right elbow 02/25/2019   • Obesity (BMI 30-39.9) 12/10/2018   • Sore throat 08/31/2018   • Central sleep apnea 06/15/2018   •  "Low ferritin level 06/15/2018   • RLS (restless legs syndrome) 05/23/2018   • Chronic insomnia 05/23/2018   • Pharyngitis 03/07/2018   • Weight gain 01/24/2018   • Sleeping difficulty 01/24/2018   • Hiatal hernia 01/03/2018   • Obesity (BMI 30.0-34.9) 01/03/2018   • PCOS (polycystic ovarian syndrome) 01/03/2018   • Hypertriglyceridemia 01/03/2018   • Labor and delivery, indication for care 06/07/2014   • TMJ (temporomandibular joint syndrome)    • Vitamin D insufficiency 12/07/2010   • Preventative health care 12/07/2010   • Fatty liver    • GERD (gastroesophageal reflux disease) 09/09/2009   • Hemorrhoid 09/09/2009       Additional History:   Allergies:    Codeine; Morphine; Sulfa drugs; and Tape     Current Medications:     Current Outpatient Prescriptions   Medication Sig Dispense Refill   • Elastic Bandages & Supports (TENNIS ELBOW STRAP) Misc 1 Each by Does not apply route every day. 1 Each 0   • metFORMIN (GLUCOPHAGE) 500 MG Tab Take 1 Tab by mouth 2 times a day, with meals. 60 Tab 1   • Multiple Vitamin (MULTIVITAMINS PO) Take  by mouth.     • calcium carbonate (TUMS) 500 MG Chew Tab Take 500 mg by mouth every day.     • ibuprofen (MOTRIN) 200 MG Tab Take 200 mg by mouth every 6 hours as needed.       No current facility-administered medications for this visit.         Social History:     Social History   Substance Use Topics   • Smoking status: Never Smoker   • Smokeless tobacco: Never Used   • Alcohol use No       ROS:     - NOTE: All other systems reviewed and are negative, except as in HPI.     Objective:   Physical Exam:    Vitals: Blood pressure 100/64, pulse 82, temperature 37.1 °C (98.8 °F), height 1.575 m (5' 2.01\"), weight 77.6 kg (171 lb), last menstrual period 06/07/2018, SpO2 95 %, not currently breastfeeding.   BMI: Body mass index is 31.27 kg/m².   General/Constitutional: Vitals as above, Well nourished, well developed female in no acute distress   Head/Eyes: Head is grossly normal & " atraumatic, bilateral conjunctivae clear and not injected, bilateral EOMI, bilateral PERRL   ENT: Bilateral external ears grossly normal in appearance, Hearing grossly intact, External nares normal in appearance and without discharge/bleeding   Respiratory: No respiratory distress, no grossly apparent wheezing/rhonchi/rales   Cardiovascular: No bilateral lower extremity edema   MSK: Extensor muscle tenderness to the ulnar side of the forearm extending from the lateral epicondyle approximately 4-6 cm distally, worsened with supination, gait grossly normal & not antalgic   Integumentary: No apparent rashes   Psych: Judgment grossly appropriate, no apparent depression/anxiety    Health Maintenance:     - Reviewed and found to be up-to-date    Imaging/Labs:     - 07/31/18 -- cholesterol labs within normal limits    Assessment and Plan:   1. Lateral epicondylitis of right elbow  New problem, uncontrolled.  Patient given conservative care instructions (NSAIDs, stretching, warm & cold compresses, OTC oral and topical analgesics) as well as instructions re: stretches sourced from Sports Medicine Advisor regarding Lateral Epicondylitis.  Also given referral to occupational therapy for further evaluation management with possible evaluation of work environment?   - Elastic Bandages & Supports (TENNIS ELBOW STRAP) Misc; 1 Each by Does not apply route every day.  Dispense: 1 Each; Refill: 0   - REFERRAL TO OCCUPATIONAL THERAPY Reason for Therapy: Eval/Treat/Report        RTC: in 04/2019 for Annual Medical Exam with PCP.    PLEASE NOTE: This dictation was created using voice recognition software. I have made every reasonable attempt to correct obvious errors, but I expect that there are errors of grammar and possibly content that I did not discover before finalizing the note.

## 2019-02-27 ENCOUNTER — OFFICE VISIT (OUTPATIENT)
Dept: BEHAVIORAL HEALTH | Facility: CLINIC | Age: 39
End: 2019-02-27
Payer: COMMERCIAL

## 2019-02-27 DIAGNOSIS — F41.8 DEPRESSION WITH ANXIETY: ICD-10-CM

## 2019-02-27 PROCEDURE — 90834 PSYTX W PT 45 MINUTES: CPT | Performed by: MARRIAGE & FAMILY THERAPIST

## 2019-02-27 NOTE — BH THERAPY
" [facility]  Therapy Progress Note    Patient Name: Lily Vasquez  Patient MRN: [id number]  Today's Date: 2019     Type of session:Individual psychotherapy  Length of session: 50 minutes  Persons in attendance:Patient    Subjective/New Info: pt is struggling with missing her mother feeling guilty for not spending more time with her before she suddenly     Objective/Observations:   Participation: Active verbal participation   Grooming: Casual   Cognition: Alert   Eye contact: Good   Mood: Depressed and Anxious   Affect: Full range   Thought process: Logical   Speech: Rate within normal limits and Volume within normal limits   Other:     Diagnoses: No diagnosis found.     Current risk:   SUICIDE: Not applicable   Homicide: Not applicable   Self-harm: Not applicable   Relapse: Not applicable   Other:    Safety Plan reviewed? No   If evidence of imminent risk is present, intervention/plan:     Therapeutic Intervention(s): Clarify:  Clarify feelings and Clarify thoughts, Cognitive modification, Positive behavior reinforced, Self-care skills, Stressors assessed and Supportive psychotherapy    Treatment Goal(s)/Objective(s) addressed: id pt stressors including grieving the loss of her mother, clarified pt feels guilty for not spending more time, using cognitive mod helped pt connect with mother's spirit, reinforced positive behavioral changes including leadership at work and at home, encouraged self care and provided support for pt    Progress toward Treatment Goals: Mild improvement    Plan:  - \"Homework\" recommendation: get filomena lin and read    ESTRELLA Chan  2019                                   "

## 2019-03-13 ENCOUNTER — OFFICE VISIT (OUTPATIENT)
Dept: BEHAVIORAL HEALTH | Facility: CLINIC | Age: 39
End: 2019-03-13
Payer: COMMERCIAL

## 2019-03-13 DIAGNOSIS — F43.21 GRIEF REACTION: ICD-10-CM

## 2019-03-13 DIAGNOSIS — F41.8 DEPRESSION WITH ANXIETY: ICD-10-CM

## 2019-03-13 PROCEDURE — 90834 PSYTX W PT 45 MINUTES: CPT | Performed by: MARRIAGE & FAMILY THERAPIST

## 2019-03-13 NOTE — BH THERAPY
" [facility]  Therapy Progress Note    Patient Name: Lily Vasquez  Patient MRN: [id number]  Today's Date: 3/13/2019     Type of session:Individual psychotherapy  Length of session: 50 minutes  Persons in attendance:Patient    Subjective/New Info: pt's  mother's birthday is tmro and pt is sad    Objective/Observations:   Participation: Active verbal participation   Grooming: Casual   Cognition: Alert   Eye contact: Good   Mood: Depressed   Affect: Sad and Tearful   Thought process: Goal-directed   Speech: Rate within normal limits and Volume within normal limits   Other:     Diagnoses: No diagnosis found.     Current risk:   SUICIDE: Not applicable   Homicide: Not applicable   Self-harm: Not applicable   Relapse: Not applicable   Other:    Safety Plan reviewed? Not Indicated   If evidence of imminent risk is present, intervention/plan:     Therapeutic Intervention(s): Clarify:  Clarify feelings and Clarify thoughts, Cognitive modification, Parenting/familial roles addressed, Positive behavior reinforced, Self-care skills, Stressors assessed and Supportive psychotherapy    Treatment Goal(s)/Objective(s) addressed: id pt stressors including tmro would have been her mother's b'day, clarified pt is missing her mother and she is sad for her, discussed how pt's family members do not discuss their feelings especially about their mother's passing, using cognitive modification helped pt plan for how she could honor her mother tmro with her children, reinforced pt's positive affirming behavior of her own childrens feelings about their grandmother's passing, encouraged self care and provided support for pt    Progress toward Treatment Goals: Mild improvement    Plan:  - \"Homework\" recommendation: do a ritual to recognize her mother's b'day tmro    ESTRELLA Chan  3/13/2019                                   "

## 2019-03-20 ENCOUNTER — SLEEP CENTER VISIT (OUTPATIENT)
Dept: SLEEP MEDICINE | Facility: MEDICAL CENTER | Age: 39
End: 2019-03-20
Payer: COMMERCIAL

## 2019-03-20 VITALS
HEIGHT: 62 IN | WEIGHT: 174 LBS | DIASTOLIC BLOOD PRESSURE: 68 MMHG | OXYGEN SATURATION: 98 % | HEART RATE: 81 BPM | RESPIRATION RATE: 16 BRPM | SYSTOLIC BLOOD PRESSURE: 102 MMHG | BODY MASS INDEX: 32.02 KG/M2

## 2019-03-20 DIAGNOSIS — F51.04 CHRONIC INSOMNIA: ICD-10-CM

## 2019-03-20 DIAGNOSIS — G47.33 OBSTRUCTIVE SLEEP APNEA SYNDROME: ICD-10-CM

## 2019-03-20 DIAGNOSIS — G25.81 RESTLESS LEG SYNDROME: ICD-10-CM

## 2019-03-20 DIAGNOSIS — G47.31 COMPLEX SLEEP APNEA SYNDROME: ICD-10-CM

## 2019-03-20 DIAGNOSIS — G47.10 HYPERSOMNOLENCE: ICD-10-CM

## 2019-03-20 PROCEDURE — 99214 OFFICE O/P EST MOD 30 MIN: CPT | Performed by: INTERNAL MEDICINE

## 2019-03-21 ENCOUNTER — OFFICE VISIT (OUTPATIENT)
Dept: HEALTH INFORMATION MANAGEMENT | Facility: MEDICAL CENTER | Age: 39
End: 2019-03-21
Payer: COMMERCIAL

## 2019-03-21 VITALS
SYSTOLIC BLOOD PRESSURE: 114 MMHG | OXYGEN SATURATION: 97 % | HEART RATE: 84 BPM | DIASTOLIC BLOOD PRESSURE: 72 MMHG | BODY MASS INDEX: 32.06 KG/M2 | WEIGHT: 174.2 LBS | HEIGHT: 62 IN

## 2019-03-21 DIAGNOSIS — E55.9 VITAMIN D INSUFFICIENCY: ICD-10-CM

## 2019-03-21 DIAGNOSIS — K76.0 FATTY LIVER: ICD-10-CM

## 2019-03-21 DIAGNOSIS — E78.1 HYPERTRIGLYCERIDEMIA: ICD-10-CM

## 2019-03-21 DIAGNOSIS — F50.9 EATING DISORDER: ICD-10-CM

## 2019-03-21 DIAGNOSIS — R63.5 WEIGHT GAIN: ICD-10-CM

## 2019-03-21 DIAGNOSIS — G47.31 CENTRAL SLEEP APNEA: ICD-10-CM

## 2019-03-21 DIAGNOSIS — E28.2 PCOS (POLYCYSTIC OVARIAN SYNDROME): ICD-10-CM

## 2019-03-21 PROCEDURE — 99214 OFFICE O/P EST MOD 30 MIN: CPT | Performed by: INTERNAL MEDICINE

## 2019-03-21 PROCEDURE — 97803 MED NUTRITION INDIV SUBSEQ: CPT | Performed by: DIETITIAN, REGISTERED

## 2019-03-21 NOTE — PROGRESS NOTES
Bariatric Medicine Follow Up  Chief Complaint   Patient presents with   • Weight Gain       History of Present Illness:   Lily Vasquez is a 38 y.o. female who presents for weight management follow-up and to help address co-morbidities related to overweight, including PCOS.    During the patient's last visit, the following were discussed and recommended:  Weight Goal: 3-5% wt loss each month (pt goal weight is 135 lb)  Diet: High Protein/Low Carb Meals and 2 snacks between meals daily  >100 g protein, <100 g total carbs daily  64+ oz water per day  Avoid sweet drinks and sodas, as she is doing  Track daily intake as she had done previously, bring in next visit  Physical Activity: Daily activity, including gym workouts, yoga or walking  Starting to record and track exercise  Risk level for moderate/vigorous exercise program: Low  New Rx: Increase metformin to twice daily  Behavior change: Resume tracking, increase activity  Follow-up: 6 wks    Restricted diet too much during day then was binge eating at night. Had changed to home made smoothie, stopped PP.  Salad for lunch.  At night she became extremely hungry and ate very large portions at night.  Now back to PP. Stopped this week when she realized she was gaining wt.    Just started higher dose of metformin.  Feels more bloated, less hunger between meals.  She is concerned about her fasting glucose, as she recently received a genetic report that she is at higher risk of developing type 2 diabetes mellitus.    Bought journal to track but does not want to start if not exercising.  Behav health helping her deal with her emotions.    Still only sleeping about 4 hr sleep/night during wk.    Exercise:   Has tennis elbow so doing arm exercises   Walking on work breaks     Review of Systems   Denies lightheadedness, diaphoresis.  Still having insomnia.  All other ROS were reviewed and are otherwise unchanged from my previous visit with patient.    Physical Exam:    BP  "114/72 (BP Location: Left arm, Patient Position: Sitting, BP Cuff Size: Adult)   Pulse 84   Ht 1.575 m (5' 2\")   Wt 79 kg (174 lb 3.2 oz)   LMP 06/07/2018   SpO2 97%   BMI 31.86 kg/m²   Waist Measurement   Waist: 37.25 inch/inches  Weight change since last visit: +3 lb (-14.6 lb total)  Waist Circum change since last visit: -0.25 in (-4.25 in total)    Constitutional: Oriented to person, place, and time and well-developed, well-nourished, and in no distress.    Head: Normocephalic.   Musculoskeletal: Normal range of motion. No edema.   Neurological: Alert and oriented to person, place, and time. No muscle weakness.  Gait normal.   Skin: Warm and dry. Not diaphoretic.   Psychiatric: Mood, memory, affect and judgment normal.     Laboratory:   Recent labs reviewed.      Dietitian Assessment: I have reviewed the Dietitian's assessment related to this encounter.       ASSESSMENT/PLAN:  Body mass index is 31.86 kg/m².    Obesity Stage ([place]): 2; Class 1    1. Eating disorder     2. Weight gain  Basic Metabolic Panel   3. PCOS (polycystic ovarian syndrome)  Basic Metabolic Panel   4. Hypertriglyceridemia     5. Central sleep apnea     6. Vitamin D insufficiency       The patient has had some recent weight gain, struggling with overeating.  Consistency is helped her in the past, needs to resume more regular eating, watch timing of meals and resume stimulus narrowing.  Increasing activity should help as well.  Continue behavioral health counseling, as emotional eating is a significant component which she is working on.  Continue higher dose of metformin given PCO S.  Monitor vitamin D, lipid levels, update metabolic panel as ordered above given risk of impaired fasting glucose.    The patient and I have discussed at length and agree to the following recommendations, which are all addressing the above diagnoses:    Weight Goal: 3-5% wt loss each month (pt goal weight is 135 lb)  Diet: Resume PP every morning  Eat " more during the day  Resume tracking, even if in written form  Reduce carbohydrates as previously discussed  Physical Activity:  Walking on work breaks  Consider activity vs exercise  Risk level for moderate/vigorous exercise program: low  New Rx: None.  Would benefit from anti-obesity medication but patient defers  Behavior change: Continue behavioral health counseling, Commit to change.  Follow-up: 2 mos    Patient's body mass index is 31.86 kg/m². Exercise and nutrition counseling were performed at this visit.

## 2019-03-21 NOTE — PROGRESS NOTES
"Nutrition Reassess: Medical Weight Management   Today's date: 3/21/2019  Referring Provider: No ref. provider found      Time: in/out 425 - 910  Visit#: 13    Subjective:  -Reports that she did not eat well for the last month  -Would do well for breakfast and lunch and then overeat once home because she \"did well\" earlier in the day and she is very hungry as well  -No regular activity at this time - is planning on going to the [facility] in [place] with her family     Diet history:   Breakfast - homemade smoothie with 2-3 cups fruit, 1 cup of spinach, 1 tablespoon of ground flaxseed and unsweetened almond milk  Snack - None  Lunch - Salad with vegetables and limited dressing to ~2 tablespoons  Snack - most of a sleeve of crackers (once home)  Dinner - large portions of all foods    Anthropometrics/Objective  Today's weight:    Vitals:    03/21/19 0843   BP: 114/72   Weight: 79 kg (174 lb 3.2 oz)   Height: 1.575 m (5' 2\")     BMI:  Body mass index is 31.86 kg/m².  Starting weight/date 188.8#     Total weight change : - 14.6#            Meal Plan:   High protein with 1 meal replacements per day     ReAssesment/Notes:    We are focusing on improving one reason why she is overeating at home - excessive hunger; she is working on the mental side of things with behavioral health.  She is going to try to front-load more kcal during her day because that should help her with hunger later in the day.  We discussed adding 2-3 ounces of plant-based protein (patient preference) to her salad at lunch, which will also add some unsaturated fats and fiber which will all help with satiety later in the day.      She is also going to restart drinking a Premier Protein shake for breakfast daily or if she is going to drink a homemade smoothie she will make two changes: no more than 1 cup of fruit and adding a protein source, like 1/2-1 cup of nonfat Greek/Tristanian yogurt.      Finally, I want Lily to shift her thinking from " "getting \"exercise\" to getting \"physical activity\" during the day; exercise is part of physical activity so if she exercises she is physically active.  She likes to walk during her breaks at work or to the park with her kids, in fact she walked to her appointment today, and those count as physical activity.  If she can increase this throughout the day on a daily basis she will be doing herself a favor and most likely increasing her positivity toward her ability to be healthy and maintain a healthy weight.      She wants to go two months between appointments to try it out.    Follow-up: 2 months    "

## 2019-03-27 NOTE — PROGRESS NOTES
CC: Sleep apnea hypopnea syndrome.     HPI:   Ms. Vasquez was evaluated here in May for snoring and excessive daytime somnolence.  A split-night polysomnogram on June 9, 2018 demonstrated an apnea hypopnea index of 29.6 events per hour and a lowest arterial oxygen saturation of 86% on room air.  There was a good response to CPAP at 7 cm of water pressure with some treatment onset central apneas but suppression of hypopnea events and preservation of arterial oxygen saturation.     Response to CPAP at 7 cm of water was suboptimal.  When she was seen on October 18, 2018 she noted persistent fatigue and daytime somnolence.  The CPAP data recording chip suggested residual AHI of 29.6 events per hour.  A titration polysomnogram on November 16, 2018 demonstrated mild fragmentation of sleep related to increased sleep onset latency.  There are only rare periodic limb movements without arousal.  CPAP was applied at 7-11 cm of water with persistence of hypopnea and central apnea episodes.  BiPAP was then applied at 13-15/9-11 cm water pressure.  In the final pressure stage, the apnea hypopnea index was 0 with a minimum arterial oxygen saturation of 91% but no REM sleep time was included in that stage and no supine sleep time was recorded.  In the penultimate pressure stage there was a single episode of hypopnea and 5 episodes of central apnea in a stage that included REM sleep time.    At the last visit BiPAP was initiated at 15/11 cm water pressure using a small air fit F 20 fullface mask.  She has a long history of chronic insomnia and that problem has been worsened if she tries to adjust to the BiPAP.  She is using machine most nights but finds it difficult to tolerate and throughout the night.  She continues to have significant daytime somnolence.    The data recording information is reviewed with the patient.  It demonstrates use on 22 over the last 30 days with an average daily usage of 5 hours and 27 minutes.  Leak is nil  and the estimated residual apnea hypopnea index is 8.6 events per hour.        Patient Active Problem List    Diagnosis Date Noted   • Reactive depression 11/12/2018     Priority: Medium     Class: Chronic   • Partner relationship problem 11/12/2018     Priority: Medium     Class: Chronic   • Anxiety 09/05/2018     Priority: Medium     Class: Acute   • Bereavement 07/10/2018     Priority: Medium     Class: Chronic   • Eating disorder 07/10/2018     Priority: Low     Class: Chronic   • Lateral epicondylitis of right elbow 02/25/2019   • Obesity (BMI 30-39.9) 12/10/2018   • Sore throat 08/31/2018   • Central sleep apnea 06/15/2018   • Low ferritin level 06/15/2018   • RLS (restless legs syndrome) 05/23/2018   • Chronic insomnia 05/23/2018   • Pharyngitis 03/07/2018   • Weight gain 01/24/2018   • Sleeping difficulty 01/24/2018   • Hiatal hernia 01/03/2018   • Obesity (BMI 30.0-34.9) 01/03/2018   • PCOS (polycystic ovarian syndrome) 01/03/2018   • Hypertriglyceridemia 01/03/2018   • Labor and delivery, indication for care 06/07/2014   • TMJ (temporomandibular joint syndrome)    • Vitamin D insufficiency 12/07/2010   • Preventative health care 12/07/2010   • Fatty liver    • GERD (gastroesophageal reflux disease) 09/09/2009   • Hemorrhoid 09/09/2009       Past Medical History:   Diagnosis Date   • Anesthesia     Severe PONV   • Arthritis     TMJ   • Bronchitis    • Chickenpox    • Dental disorder     permanent retainer   • Eating disorder 7/10/2018   • Fatty liver    • GERD (gastroesophageal reflux disease)    • Gestational diabetes    • Hemorrhoids    • Hiatus hernia syndrome    • Indigestion     GERD   • Influenza    • Neck pain    • Obesity    • Vitamin D deficiency        Past Surgical History:   Procedure Laterality Date   • LISBET BY LAPAROSCOPY  12/18/2012    [facility] Id[facility] M.D. [facility]   • LARYNGOSCOPY  7/6/2011    Performed by EDNA MINA at SUR[facility]ERY [facility] DAY [facility]  "  • ESOPHAGOSCOPY  7/6/2011    Performed by EDNA MINA at [facility]ERY [facility] DAY [facility]   • TONSILLECTOMY AND ADENOIDECTOMY  1991   • OTHER  1991    TONSILLECTOMY    • CHOLECYSTECTOMY         Family History   Problem Relation Age of Onset   • Arthritis Mother    • Hypertension Mother    • Hyperlipidemia Mother    • Sleep Apnea Mother    • Psychiatry Father    • Alcohol abuse Father    • Thyroid Sister    • Heart Disease Maternal Grandmother    • Heart Disease Maternal Grandfather    • Heart Disease Paternal Grandmother    • Diabetes Paternal Grandmother    • Cancer Paternal Grandfather         brain   • Stroke Unknown        Social History     Social History   • Marital status:      Spouse name: N/A   • Number of children: N/A   • Years of education: N/A     Occupational History   • Not on file.     Social History Main Topics   • Smoking status: Never Smoker   • Smokeless tobacco: Never Used   • Alcohol use No   • Drug use: No   • Sexual activity: Yes     Partners: Male      Comment: , works at [employer]     Other Topics Concern   • Not on file     Social History Narrative   • No narrative on file       Current Outpatient Prescriptions   Medication Sig Dispense Refill   • metFORMIN (GLUCOPHAGE) 500 MG Tab Take 1 Tab by mouth 2 times a day, with meals. 60 Tab 1   • Multiple Vitamin (MULTIVITAMINS PO) Take  by mouth.     • calcium carbonate (TUMS) 500 MG Chew Tab Take 500 mg by mouth every day.     • ibuprofen (MOTRIN) 200 MG Tab Take 200 mg by mouth every 6 hours as needed.       No current facility-administered medications for this visit.     \"CURRENT RX\"      Allergies: Codeine; Morphine; Sulfa drugs; and Tape      ROS  Unchanged from prior.      Physical Exam:   /68 (BP Location: Right arm, Patient Position: Sitting, BP Cuff Size: Adult)   Pulse 81   Resp 16   Ht 1.575 m (5' 2\")   Wt 78.9 kg (174 lb)   LMP 06/07/2018   SpO2 98%   BMI 31.83 kg/m²    Head and neck examination " demonstrates no mucosal lesion, purulent drainage or evident polyps. The pharynx is benign with a Mallampati III presentation. The neck is supple without thyromegaly. On chest examination there are symmetrical bilateral breath sounds without rales, wheezing or consolidation. On cardiac examination, the apical impulse and heart sounds are normal and the rhythm is regular. There is no murmur, gallop or rub and no jugular venous distention. The abdomen is soft with active bowel sounds and no palpable hepatosplenomegaly, mass, guarding or rebound. The extremities show no clubbing, cyanosis or edema and no signs of deep venous thrombosis. There is no warmth, redness, tenderness or palpable venous cord in the calves. The skin is clear, warm and dry. There is no unusual peripheral lymphadenopathy. Peripheral pulses are palpable in all 4 extremities. On neurologic examination, cranial nerve function is intact, motor tone is symmetrical, and the patient is alert, oriented and responsive.       Problems:  1. Complex  sleep apnea hypopnea syndrome  She has severe sleep apnea hypopnea with an apnea hypopnea index of 29.6 events per hour and a lowest arterial oxygen saturation of 86% on room air.  Treatment with CPAP was unsuccessful and the titration studies documented the evolution of persisting central apnea episodes on CPAP, consistent with complex sleep apnea.  She did well on BiPAP in a follow up polysomnogram using somewhat higher pressures with effective suppression of both central and obstructive episodes and preservation of arterial oxygen saturation.  Effective treatment is required only to improve levels of daytime alertness but also to reduce the cardiac and neurologic risks associated with untreated sleep apnea hypopnea. Because of her chronic insomnia she has not fully acclimated to the BiPAP although she is managing to use it for more than 70% of the days in the last month with an average daily usage of about 5.5  hours.    2. Hypersomnolence  Related both to the sleep-disordered breathing and to restricted sleep time associated with chronic insomnia.    3. Chronic insomnia  With both sleep onset and sleep maintenance elements, exacerbated by the adjustment to BiPAP therapy.  We have talked about treatment of chronic insomnia and particularly about cognitive behavioral therapy for insomnia.  We have also talked about the risks and benefits of prescription soporific agents and I would agree with her reluctance to begin treatment with medications at this time.  She is very willing to consider cognitive behavioral therapy.    4.  Restless leg syndrome  Her serum ferritin level was low and she remains on over-the-counter iron supplements at 325 mg a day.    5. BMI 31.0-31.9,adult  - Height And Weight        Plan:   1.  Continue BiPAP at 15/11 cm water pressure.  We have talked about fully acclimating to the BiPAP with proper mask fit, effective humidification and consistent use.    2.  Referral to Chely Garnett, PhD, for evaluation and possible cognitive behavioral therapy.    3.  Continue iron supplements at 325 mg a day.  Follow-up iron studies and ferritin would be appropriate at the next office visit.    4.  Return visit here in about 3 months, bringing the data recording chip with her.  She may drop into the technician clinic at anytime for assistance.    We appreciate the opportunity to assist in her care.  Return in about 3 months (around 6/20/2019).

## 2019-04-03 ENCOUNTER — OFFICE VISIT (OUTPATIENT)
Dept: BEHAVIORAL HEALTH | Facility: CLINIC | Age: 39
End: 2019-04-03
Payer: COMMERCIAL

## 2019-04-03 DIAGNOSIS — F41.8 DEPRESSION WITH ANXIETY: ICD-10-CM

## 2019-04-03 DIAGNOSIS — Z56.6 WORK-RELATED STRESS: ICD-10-CM

## 2019-04-03 DIAGNOSIS — Z63.4 BEREAVEMENT: ICD-10-CM

## 2019-04-03 PROCEDURE — 90834 PSYTX W PT 45 MINUTES: CPT | Performed by: MARRIAGE & FAMILY THERAPIST

## 2019-04-03 SDOH — SOCIAL STABILITY - SOCIAL INSECURITY: DISSAPEARANCE AND DEATH OF FAMILY MEMBER: Z63.4

## 2019-04-03 SDOH — HEALTH STABILITY - MENTAL HEALTH: OTHER PHYSICAL AND MENTAL STRAIN RELATED TO WORK: Z56.6

## 2019-04-03 NOTE — BH THERAPY
" [facility]  Therapy Progress Note    Patient Name: Lily Vasquez  Patient MRN: [id number]  Today's Date: 4/3/2019     Type of session:Individual psychotherapy  Length of session: 50 minutes  Persons in attendance:Patient    Subjective/New Info: pt has the hearing with the [employer] attorneys and she is anxious, pt is also concerned and depressed about her son Dean's ADHD struggles in school and socially    Objective/Observations:   Participation: Active verbal participation   Grooming: Casual   Cognition: Alert   Eye contact: Good   Mood: Depressed and Anxious   Affect: Full range   Thought process: Goal-directed   Speech: Rate within normal limits and Volume within normal limits   Other:     Diagnoses: No diagnosis found.     Current risk:   SUICIDE: Not applicable   Homicide: Not applicable   Self-harm: Not applicable   Relapse: Not applicable   Other:    Safety Plan reviewed? Not Indicated   If evidence of imminent risk is present, intervention/plan:     Therapeutic Intervention(s): Clarify:  Clarify feelings and Clarify thoughts, Cognitive modification, Positive behavior reinforced, Self-care skills, Stressors assessed and Supportive psychotherapy    Treatment Goal(s)/Objective(s) addressed: id pt stressors including hearing with [employer] attorneys and worry about her son, clarified pt is depressed and anxious, using cognitive modification helped validate what she can control, reinforced pt's positive listening behavior, encouraged self care including yoga and provided support for pt     Progress toward Treatment Goals: Mild improvement    Plan:  - \"Homework\" recommendation: ESTRELLA Mayorga  4/3/2019                                   "

## 2019-04-07 DIAGNOSIS — E28.2 PCOS (POLYCYSTIC OVARIAN SYNDROME): ICD-10-CM

## 2019-04-08 NOTE — TELEPHONE ENCOUNTER
Was the patient seen in the last year in this department? Yes 3/21/19    Does patient have an active prescription for medications requested? Yes    Received Request Via: Pharmacy

## 2019-04-10 ENCOUNTER — HOSPITAL ENCOUNTER (OUTPATIENT)
Dept: LAB | Facility: MEDICAL CENTER | Age: 39
End: 2019-04-10
Attending: INTERNAL MEDICINE
Payer: COMMERCIAL

## 2019-04-10 DIAGNOSIS — R63.5 WEIGHT GAIN: ICD-10-CM

## 2019-04-10 DIAGNOSIS — E28.2 PCOS (POLYCYSTIC OVARIAN SYNDROME): ICD-10-CM

## 2019-04-10 LAB
ANION GAP SERPL CALC-SCNC: 15 MMOL/L (ref 0–11.9)
BUN SERPL-MCNC: 17 MG/DL (ref 8–22)
CALCIUM SERPL-MCNC: 10.1 MG/DL (ref 8.5–10.5)
CHLORIDE SERPL-SCNC: 103 MMOL/L (ref 96–112)
CO2 SERPL-SCNC: 22 MMOL/L (ref 20–33)
CREAT SERPL-MCNC: 0.75 MG/DL (ref 0.5–1.4)
FASTING STATUS PATIENT QL REPORTED: NORMAL
GLUCOSE SERPL-MCNC: 83 MG/DL (ref 65–99)
POTASSIUM SERPL-SCNC: 4.4 MMOL/L (ref 3.6–5.5)
SODIUM SERPL-SCNC: 140 MMOL/L (ref 135–145)

## 2019-04-10 PROCEDURE — 80048 BASIC METABOLIC PNL TOTAL CA: CPT

## 2019-04-10 PROCEDURE — 36415 COLL VENOUS BLD VENIPUNCTURE: CPT

## 2019-04-16 ENCOUNTER — OFFICE VISIT (OUTPATIENT)
Dept: MEDICAL GROUP | Facility: MEDICAL CENTER | Age: 39
End: 2019-04-16
Payer: COMMERCIAL

## 2019-04-16 VITALS
DIASTOLIC BLOOD PRESSURE: 70 MMHG | WEIGHT: 177.25 LBS | RESPIRATION RATE: 14 BRPM | SYSTOLIC BLOOD PRESSURE: 110 MMHG | HEART RATE: 78 BPM | OXYGEN SATURATION: 95 % | HEIGHT: 62 IN | BODY MASS INDEX: 32.62 KG/M2 | TEMPERATURE: 98.2 F

## 2019-04-16 DIAGNOSIS — Z00.00 PREVENTATIVE HEALTH CARE: ICD-10-CM

## 2019-04-16 DIAGNOSIS — E66.9 OBESITY (BMI 30.0-34.9): ICD-10-CM

## 2019-04-16 DIAGNOSIS — Z63.0 PARTNER RELATIONSHIP PROBLEM: ICD-10-CM

## 2019-04-16 DIAGNOSIS — G47.31 CENTRAL SLEEP APNEA: ICD-10-CM

## 2019-04-16 DIAGNOSIS — F51.04 CHRONIC INSOMNIA: ICD-10-CM

## 2019-04-16 DIAGNOSIS — Z63.4 BEREAVEMENT: ICD-10-CM

## 2019-04-16 DIAGNOSIS — K76.0 FATTY LIVER: ICD-10-CM

## 2019-04-16 DIAGNOSIS — E55.9 VITAMIN D INSUFFICIENCY: ICD-10-CM

## 2019-04-16 DIAGNOSIS — M26.609 TMJ (TEMPOROMANDIBULAR JOINT SYNDROME): ICD-10-CM

## 2019-04-16 DIAGNOSIS — E28.2 PCOS (POLYCYSTIC OVARIAN SYNDROME): ICD-10-CM

## 2019-04-16 DIAGNOSIS — K44.9 HIATAL HERNIA: ICD-10-CM

## 2019-04-16 DIAGNOSIS — K21.9 GASTROESOPHAGEAL REFLUX DISEASE WITHOUT ESOPHAGITIS: ICD-10-CM

## 2019-04-16 PROBLEM — M77.11 LATERAL EPICONDYLITIS OF RIGHT ELBOW: Status: RESOLVED | Noted: 2019-02-25 | Resolved: 2019-04-16

## 2019-04-16 PROBLEM — J02.9 SORE THROAT: Status: RESOLVED | Noted: 2018-08-31 | Resolved: 2019-04-16

## 2019-04-16 PROBLEM — R79.0 LOW FERRITIN LEVEL: Status: RESOLVED | Noted: 2018-06-15 | Resolved: 2019-04-16

## 2019-04-16 PROBLEM — F32.9 REACTIVE DEPRESSION: Status: RESOLVED | Noted: 2018-11-12 | Resolved: 2019-04-16

## 2019-04-16 PROBLEM — R63.5 WEIGHT GAIN: Status: RESOLVED | Noted: 2018-01-24 | Resolved: 2019-04-16

## 2019-04-16 PROBLEM — E78.1 HYPERTRIGLYCERIDEMIA: Status: RESOLVED | Noted: 2018-01-03 | Resolved: 2019-04-16

## 2019-04-16 PROBLEM — J02.9 PHARYNGITIS: Status: RESOLVED | Noted: 2018-03-07 | Resolved: 2019-04-16

## 2019-04-16 PROBLEM — G47.9 SLEEPING DIFFICULTY: Status: RESOLVED | Noted: 2018-01-24 | Resolved: 2019-04-16

## 2019-04-16 PROBLEM — F41.9 ANXIETY: Status: RESOLVED | Noted: 2018-09-05 | Resolved: 2019-04-16

## 2019-04-16 PROCEDURE — 99395 PREV VISIT EST AGE 18-39: CPT | Performed by: FAMILY MEDICINE

## 2019-04-16 SDOH — SOCIAL STABILITY - SOCIAL INSECURITY: PROBLEMS IN RELATIONSHIP WITH SPOUSE OR PARTNER: Z63.0

## 2019-04-16 SDOH — SOCIAL STABILITY - SOCIAL INSECURITY: DISSAPEARANCE AND DEATH OF FAMILY MEMBER: Z63.4

## 2019-04-16 NOTE — ASSESSMENT & PLAN NOTE
This is a chronic health problem for this patient where her last vitamin D was low at 22.  I had like to see her start taking vitamin D3 2000 international units daily and then we would get her vitamin D up to the level of 40-80 which will help with all of her other health problems.  She is willing to do this and we will write that as a direction for her to pick that up over-the-counter

## 2019-04-16 NOTE — ASSESSMENT & PLAN NOTE
Patient continues to be seen at therapy and is able to deal with this and her mother's passing at the therapy.  She will continue with this long-term.

## 2019-04-16 NOTE — ASSESSMENT & PLAN NOTE
This continues to be a problem for this patient because there is a lawsuit involved with her mother's death and so the patient is constantly having to re-live that situation and the whole consternation of what went on causing her mother's death.  Patient does not find that she is been able to get closure as of yet.

## 2019-04-16 NOTE — ASSESSMENT & PLAN NOTE
This is a chronic health problem for which the patient is on BiPAP.  She finds she is only getting about 4 hours of sleep per night and it does help her to sleep better but she was hoping that she get longer sleep.  She will continue to work with it.

## 2019-04-16 NOTE — ASSESSMENT & PLAN NOTE
This is a chronic health problem where the patient is working with Dr. Meraz on weight loss.  She was able to lose about 25 pounds last year and maintain that weight loss until about October and then life took over and she is gained 13 of those pounds back.  She is rededicated to her weight loss and working on it.  Her current BMI is 32.42.

## 2019-04-16 NOTE — PROGRESS NOTES
CC:  Diagnoses of PCOS (polycystic ovarian syndrome), Obesity (BMI 30.0-34.9), Bereavement, Central sleep apnea, Chronic insomnia, Fatty liver, Hiatal hernia, Gastroesophageal reflux disease without esophagitis, Partner relationship problem, TMJ (temporomandibular joint syndrome), Vitamin D insufficiency, and Preventative health care were pertinent to this visit.    HISTORY OF THE PRESENT ILLNESS: Patient is a 38 y.o. female. This pleasant patient is here today to establish care and have her annual wellness exam.  Patient previously has seen multiple providers but not really settled down with 1.  Here today to see if we are a fit.    Health Maintenance: Completed      PCOS (polycystic ovarian syndrome)  This is a chronic health problem for this patient where she on April 10 she does had a basic metabolic panel done and at that time her blood sugar was 83.  She is on metformin 500 mg twice a day trying to stabilize her PCO S.  She appears to be doing well.  She is having menstrual cycles on a regular basis.  Appears to be doing very well.    Obesity (BMI 30.0-34.9)  This is a chronic health problem where the patient is working with Dr. Meraz on weight loss.  She was able to lose about 25 pounds last year and maintain that weight loss until about October and then life took over and she is gained 13 of those pounds back.  She is rededicated to her weight loss and working on it.  Her current BMI is 32.42.    Bereavement  This continues to be a problem for this patient because there is a lawsuit involved with her mother's death and so the patient is constantly having to re-live that situation and the whole consternation of what went on causing her mother's death.  Patient does not find that she is been able to get closure as of yet.    Central sleep apnea  This is a chronic health problem for which the patient is on BiPAP.  She finds she is only getting about 4 hours of sleep per night and it does help her to sleep  better but she was hoping that she get longer sleep.  She will continue to work with it.    Chronic insomnia  This is a chronic health problem for this patient where her sleep medicine physician has recommended cognitive behavioral therapy to try and help her with this problem.  They have put in that referral.    Fatty liver  This is a chronic health problem for this patient that stable.  She last had a comprehensive metabolic panel in June.  We will plan to recheck that.    Hiatal hernia  This is a chronic health problem for this patient that at one time she required Pepcid to be able to keep her GERD and the hiatal hernia under control.  Since she lost that weight she has been able to control the symptoms without medication.  She is back on the plan to lose more weight so this will continue to improve.    GERD (Gastroesophageal Reflux Disease)  Chronic health problem that was improved with utilizing weight loss.    Partner relationship problem  Patient continues to be seen at therapy and is able to deal with this and her mother's passing at the therapy.  She will continue with this long-term.    TMJ (temporomandibular joint syndrome)  This is a chronic health problem but only gives her problems when she has to go to the dentist and open her mouth for extended periods of time, other than that he usually does fine.    Vitamin D insufficiency  This is a chronic health problem for this patient where her last vitamin D was low at 22.  I had like to see her start taking vitamin D3 2000 international units daily and then we would get her vitamin D up to the level of 40-80 which will help with all of her other health problems.  She is willing to do this and we will write that as a direction for her to pick that up over-the-counter    Preventative health care  Patient here today to do her annual wellness exam as part of [place] health [place].  We will order baseline blood work.    Allergies: Codeine; Morphine;  Sulfa drugs; and Tape    Current Outpatient Prescriptions Ordered in [facility]   Medication Sig Dispense Refill   • metFORMIN (GLUCOPHAGE) 500 MG Tab Take 1 Tab by mouth 2 times a day, with meals. 60 Tab 1   • calcium carbonate (TUMS) 500 MG Chew Tab Take 500 mg by mouth every day.     • ibuprofen (MOTRIN) 200 MG Tab Take 200 mg by mouth every 6 hours as needed.       No current Epic-ordered facility-administered medications on file.        Past Medical History:   Diagnosis Date   • Anesthesia     Severe PONV   • Arthritis     TMJ   • Bronchitis    • Chickenpox    • Dental disorder     permanent retainer   • Eating disorder 7/10/2018   • Fatty liver    • GERD (gastroesophageal reflux disease)    • Gestational diabetes    • Hemorrhoids    • Hiatus hernia syndrome    • Indigestion     GERD   • Influenza    • Neck pain    • Obesity    • Vitamin D deficiency        Past Surgical History:   Procedure Laterality Date   • LISBET BY LAPAROSCOPY  12/18/2012    Performed by Idania Camara M.D. at SURGERY [facility] ORS   • LARYNGOSCOPY  7/6/2011    Performed by EDNA MINA at SURGERY SAME DAY [facility] ORS   • ESOPHAGOSCOPY  7/6/2011    Performed by EDNA MINA at SURGERY SAME DAY [facility] ORS   • TONSILLECTOMY AND ADENOIDECTOMY  1991   • OTHER  1991    TONSILLECTOMY    • CHOLECYSTECTOMY         Social History   Substance Use Topics   • Smoking status: Never Smoker   • Smokeless tobacco: Never Used   • Alcohol use No       Social History     Social History Narrative   • No narrative on file       Family History   Problem Relation Age of Onset   • Arthritis Mother    • Hypertension Mother    • Hyperlipidemia Mother    • Sleep Apnea Mother    • Psychiatry Father    • Alcohol abuse Father    • Thyroid Sister    • Heart Disease Maternal Grandmother    • Heart Disease Maternal Grandfather    • Heart Disease Paternal Grandmother    • Diabetes Paternal Grandmother    • Cancer Paternal Grandfather         brain   • Stroke  "Unknown        ROS:     - Constitutional: Negative for fever, chills, unexpected weight change, and fatigue/generalized weakness.     - HEENT: Negative for headaches, vision changes, hearing changes, ear pain, ear discharge, rhinorrhea, sinus congestion, sore throat, and neck pain.      - Respiratory: Negative for cough, sputum production, chest congestion, dyspnea, wheezing, and crackles.      - Cardiovascular: Negative for chest pain, palpitations, orthopnea, and bilateral lower extremity edema.     - Gastrointestinal: Negative for heartburn, nausea, vomiting, abdominal pain, hematochezia, melena, diarrhea, constipation, and greasy/foul-smelling stools.     - Genitourinary: Negative for dysuria, polyuria, hematuria, pyuria, urinary urgency, and urinary incontinence.     - Musculoskeletal: Negative for myalgias, back pain, and joint pain.     - Skin: Negative for rash, itching, cyanotic skin color change.     - Neurological: Negative for dizziness, tingling, tremors, focal sensory deficit, focal weakness and headaches.     - Endo/Heme/Allergies: Does not bruise/bleed easily.     - Psychiatric/Behavioral: Patient continues to struggle with reactive depression due to the death of her mother after car accident in the [employer] parking lot.  She denies suicidal or homicidal ideation.          - NOTE: All other systems reviewed and are negative, except as in HPI.      .      Exam: /70 (BP Location: Left arm, Patient Position: Sitting, BP Cuff Size: Adult)   Pulse 78   Temp 36.8 °C (98.2 °F) (Temporal)   Resp 14   Ht 1.575 m (5' 2\")   Wt 80.4 kg (177 lb 4 oz)   SpO2 95%  Body mass index is 32.42 kg/m².    General: Normal appearing. No distress.  HEENT: Normocephalic. Eyes conjunctiva clear lids without ptosis, pupils equal and reactive to light accommodation, ears normal shape and contour, canals are clear bilaterally, tympanic membranes are benign, nasal mucosa benign, oropharynx is without erythema, edema or " exudates.   Neck: Supple without JVD or bruit. Thyroid is not enlarged.  Pulmonary: Clear to ausculation.  Normal effort. No rales, ronchi, or wheezing.  Cardiovascular: Regular rate and rhythm without murmur. Carotid and radial pulses are intact and equal bilaterally.  Abdomen: Soft, nontender, nondistended. Normal bowel sounds. Liver and spleen are not palpable  Neurologic: Grossly nonfocal  Lymph: No cervical, supraclavicular or axillary lymph nodes are palpable  Skin: Warm and dry.  No obvious lesions.  Musculoskeletal: Normal gait. No extremity cyanosis, clubbing, or edema.  Psych: Normal mood and affect. Alert and oriented x3. Judgment and insight is normal.    Please note that this dictation was created using voice recognition software. I have made every reasonable attempt to correct obvious errors, but I expect that there are errors of grammar and possibly content that I did not discover before finalizing the note.      Assessment/Plan  1. PCOS (polycystic ovarian syndrome)  Currently controlled with medication.    2. Obesity (BMI 30.0-34.9)  Uncontrolled, patient getting ready to go back to Dr. Meraz and restart her weight loss program.    3. Bereavement  Patient continues in therapy    4. Central sleep apnea  Controlled, continue with current meds and lifestyle.      5. Chronic insomnia  Uncontrolled but Dr. Lerner has recommended she go to cognitive behavioral therapy and she has that referral.    6. Fatty liver  Controlled, continue with current meds and lifestyle.      7. Hiatal hernia  Patient has been able to control this problem without medication since she her weight loss.    8. Gastroesophageal reflux disease without esophagitis  Patient able to control this without weight loss.    9. Partner relationship problem  Patient continues in therapy which is helpful    10. TMJ (temporomandibular joint syndrome)  Stable    11. Vitamin D insufficiency  Uncontrolled, I recommended that the patient  start on vitamin D 2000 international units daily so that we can get her level up between 40-80.    12. Preventative health care  Patient will do baseline blood work and we will notify her of results via my chart  - Comp Metabolic Panel; Future  - Lipid Profile; Future  - CBC WITH DIFFERENTIAL; Future

## 2019-04-16 NOTE — ASSESSMENT & PLAN NOTE
This is a chronic health problem for this patient that stable.  She last had a comprehensive metabolic panel in June.  We will plan to recheck that.

## 2019-04-16 NOTE — ASSESSMENT & PLAN NOTE
This is a chronic health problem for this patient where her sleep medicine physician has recommended cognitive behavioral therapy to try and help her with this problem.  They have put in that referral.

## 2019-04-16 NOTE — ASSESSMENT & PLAN NOTE
This is a chronic health problem but only gives her problems when she has to go to the dentist and open her mouth for extended periods of time, other than that he usually does fine.

## 2019-04-16 NOTE — ASSESSMENT & PLAN NOTE
Patient here today to do her annual wellness exam as part of [place] health [place].  We will order baseline blood work.

## 2019-04-16 NOTE — ASSESSMENT & PLAN NOTE
This is a chronic health problem for this patient where she on April 10 she does had a basic metabolic panel done and at that time her blood sugar was 83.  She is on metformin 500 mg twice a day trying to stabilize her PCO S.  She appears to be doing well.  She is having menstrual cycles on a regular basis.  Appears to be doing very well.

## 2019-04-24 ENCOUNTER — OFFICE VISIT (OUTPATIENT)
Dept: BEHAVIORAL HEALTH | Facility: CLINIC | Age: 39
End: 2019-04-24
Payer: COMMERCIAL

## 2019-04-24 DIAGNOSIS — F41.8 DEPRESSION WITH ANXIETY: ICD-10-CM

## 2019-04-24 DIAGNOSIS — Z63.4 BEREAVEMENT: ICD-10-CM

## 2019-04-24 PROCEDURE — 90834 PSYTX W PT 45 MINUTES: CPT | Performed by: MARRIAGE & FAMILY THERAPIST

## 2019-04-24 SDOH — SOCIAL STABILITY - SOCIAL INSECURITY: DISSAPEARANCE AND DEATH OF FAMILY MEMBER: Z63.4

## 2019-04-24 NOTE — BH THERAPY
" [facility]  Therapy Progress Note    Patient Name: Lily Vasquez  Patient MRN: [id number]  Today's Date: 4/24/2019     Type of session:Individual psychotherapy  Length of session: 50 minutes  Persons in attendance:Patient    Subjective/New Info: pt is missing her mother over Easter holiday and it is her birthday this week and she is especially missing her mom    Objective/Observations:   Participation: Active verbal participation   Grooming: Casual   Cognition: Alert   Eye contact: Good   Mood: Depressed and Anxious   Affect: Full range   Thought process: Goal-directed   Speech: Rate within normal limits and Volume within normal limits   Other:     Diagnoses: No diagnosis found.     Current risk:   SUICIDE: Not applicable   Homicide: Not applicable   Self-harm: Not applicable   Relapse: Not applicable   Other:    Safety Plan reviewed? Not Indicated   If evidence of imminent risk is present, intervention/plan:     Therapeutic Intervention(s): Clarify:  Clarify feelings and Clarify thoughts, Cognitive modification, Positive behavior reinforced, Self-care skills, Stressors assessed and Supportive psychotherapy    Treatment Goal(s)/Objective(s) addressed: id pt stressors including easter without her mom and her birthday without her mom, clarified pt is grieving the loss and absence of her mom, using cognitive modification helped pt deepen into her memories of her mom, reinforced positive boundary setting with her father and siblings when they pick on her for coming to counseling and for her grieving, encouraged self care especially on her birthday and provided support for pt    Progress toward Treatment Goals: Mild improvement    Plan:  - \"Homework\" recommendation: do something just for herself on her birthday    ESTRELLA Chan  4/24/2019                                   "

## 2019-05-07 DIAGNOSIS — E28.2 PCOS (POLYCYSTIC OVARIAN SYNDROME): ICD-10-CM

## 2019-05-22 ENCOUNTER — OFFICE VISIT (OUTPATIENT)
Dept: HEALTH INFORMATION MANAGEMENT | Facility: MEDICAL CENTER | Age: 39
End: 2019-05-22
Payer: COMMERCIAL

## 2019-05-22 VITALS
OXYGEN SATURATION: 95 % | BODY MASS INDEX: 33.29 KG/M2 | SYSTOLIC BLOOD PRESSURE: 108 MMHG | DIASTOLIC BLOOD PRESSURE: 68 MMHG | HEART RATE: 89 BPM | WEIGHT: 180.9 LBS | HEIGHT: 62 IN

## 2019-05-22 DIAGNOSIS — E28.2 PCOS (POLYCYSTIC OVARIAN SYNDROME): ICD-10-CM

## 2019-05-22 DIAGNOSIS — E66.9 OBESITY (BMI 30.0-34.9): ICD-10-CM

## 2019-05-22 DIAGNOSIS — K76.0 FATTY LIVER: ICD-10-CM

## 2019-05-22 DIAGNOSIS — G47.31 CENTRAL SLEEP APNEA: ICD-10-CM

## 2019-05-22 DIAGNOSIS — F51.04 CHRONIC INSOMNIA: ICD-10-CM

## 2019-05-22 DIAGNOSIS — E55.9 VITAMIN D INSUFFICIENCY: ICD-10-CM

## 2019-05-22 PROCEDURE — 97803 MED NUTRITION INDIV SUBSEQ: CPT | Performed by: DIETITIAN, REGISTERED

## 2019-05-22 PROCEDURE — 99214 OFFICE O/P EST MOD 30 MIN: CPT | Performed by: INTERNAL MEDICINE

## 2019-05-22 NOTE — PROGRESS NOTES
"Bariatric Medicine Follow Up  Chief Complaint   Patient presents with   • Weight Gain       History of Present Illness:   Lily Vasquez is a 39 y.o. female who presents for weight management follow-up and to help address co-morbidities caused by overweight, as below.    During the patient's last visit, the following were discussed and recommended:  Weight Goal: 3-5% wt loss each month (pt goal weight is 135 lb)  Diet: Resume PP every morning  Eat more during the day  Resume tracking, even if in written form  Reduce carbohydrates as previously discussed  Physical Activity:  Walking on work breaks  Consider activity vs exercise  Risk level for moderate/vigorous exercise program: low  New Rx: None.  Would benefit from anti-obesity medication but patient defers  Behavior change: Continue behavioral health counseling, Commit to change.  Follow-up: 2 mos    Evenings still difficult, overeating.  Feels stressed trying to figure out what to eat, eating same for am PP qam and lunch but dinner hard.  Spreadsheet for pm, track again goals.  Just got food scale, was not weighing food for portion control.    PCOS: Continues on metformin  Insomnia: No significant change  Vitamin D insufficiency: Not currently on vitamin D repletion with last vitamin D low normal  Depression/anxiety: Still seeing behavioral health    Exercise:   Walking at work     Review of Systems   Significant cravings still at night.  Stress and anxiety at times.  All other ROS were reviewed and are otherwise unchanged from my previous visit with patient.    Physical Exam:    /68 (BP Location: Left arm, Patient Position: Sitting, BP Cuff Size: Large adult)   Pulse 89   Ht 1.575 m (5' 2\")   Wt 82.1 kg (180 lb 14.4 oz)   SpO2 95%   BMI 33.09 kg/m²   Waist Measurement   Waist: 37.75 inch/inches  Weight change since last visit: +6.7 lb (-8 lb total)  Waist Circum change since last visit: +0.5 in (-4 in total)    Constitutional: Oriented to person, " place, and time and well-developed, well-nourished, and in no distress.    Head: Normocephalic.   Musculoskeletal: Normal range of motion. No edema.   Neurological: Alert and oriented to person, place, and time. No muscle weakness.  Gait normal.   Skin: Warm and dry. Not diaphoretic.   Psychiatric: Mood, memory, affect and judgment normal.     Laboratory:   Recent labs reviewed.      Dietitian Assessment: I have reviewed the Dietitian's assessment related to this encounter.     ASSESSMENT/PLAN:  Body mass index is 33.09 kg/m².    Obesity Stage ([place]): 2; Class 1    1. PCOS (polycystic ovarian syndrome)     2. Vitamin D insufficiency     3. Central sleep apnea     4. Chronic insomnia     5. Obesity (BMI 30.0-34.9)       Although the patient has lost weight since she started the program, no significant progress in the last few months towards her weight loss goals.  Strongly suggest anti-obesity medication, but patient defers, will consider.  Continue metformin given PCOS.  Work on reducing evening portion sizes, especially given insomnia and JAN, which should help improve these.  Continue monitoring vitamin D.  Continue behavioral health.    The patient and I have discussed at length and agree to the following recommendations, which are all addressing the above diagnoses:    Weight Goal: 3-5% wt loss each month (pt goal weight is 135 lb)  Diet:   Continues PP qam and same lunch  Work on easy, less stressful dinners, better planning and spreadsheet  Physical Activity: Walking at work  Risk level for moderate/vigorous exercise program: Low  New Rx: Strongly consider phentermine, Contrave or liraglutide  Patient to consider  Cannot take OCPs so best to avoid topiramate.  Behavior change: Consider behavioral health counseling, commit more to change  Follow-up: 6 wks    Patient's body mass index is 33.09 kg/m². Exercise and nutrition counseling were performed at this visit.

## 2019-05-22 NOTE — PROGRESS NOTES
"Nutrition Reassess: Medical Weight Management   Today's date: 5/22/2019  Referring Provider: No ref. provider found      Time: in/out  8:58-9:23am   Visit#: 15     Subjective:  -Pt is struggling with dinners and eating/snacking after work   -Has a long commute home and after getting the kids she is stressed and tried. Finds herself eating chips and cookies and other junk food when she gets home (stress eats)   -Tries to plan meals out for the week but although she enjoys cooking she doesn't do well with too much variety   -Wants to have a few simple meal ideas for dinners and then on weekend have more flexibility to cook.     -Still using Premier protein for breakfast   -Has 2 healthy snacks a day at work (protein and veggies)     Anthropometrics/Objective  Today's weight:    Vitals:    05/22/19 0842   BP: 108/68   Weight: 82.1 kg (180 lb 14.4 oz)   Height: 1.575 m (5' 2\")     BMI:  Body mass index is 33.09 kg/m².  Starting weight/date 188.8lb      Total weight change : -8.4lb   +6.2lb since last visit          Meal Plan:   High protein, low carb diet with 1 meal replacements per day     ReAssesment/Notes:  Pt is doing well during the day with a healthy balanced diet. She is not over eating after work bc of physical hunger but more out of stress. She does meet with a therapist. Today we discussed tips for avoiding the stress snacking after work. She will try cutting out any snacks btwn her PM snack and dinner, but if she has to have something recommended 1 oz nuts Or ns veggies Or 1 fruit. Today we also discussed meal planning for dinners. Started working out a spreadsheet with pt to use to plan out meals. Gave her the grocery game planner calender as well. She will also work on cutting out \"treats/sweets\" to 2x per month or less.     Follow-up: 4 weeks with RD only, 6 weeks with MD only    "

## 2019-06-05 ENCOUNTER — OFFICE VISIT (OUTPATIENT)
Dept: BEHAVIORAL HEALTH | Facility: CLINIC | Age: 39
End: 2019-06-05
Payer: COMMERCIAL

## 2019-06-05 DIAGNOSIS — F41.8 DEPRESSION WITH ANXIETY: ICD-10-CM

## 2019-06-05 PROCEDURE — 90834 PSYTX W PT 45 MINUTES: CPT | Performed by: MARRIAGE & FAMILY THERAPIST

## 2019-06-05 NOTE — BH THERAPY
" [facility]  Therapy Progress Note    Patient Name: Lily Vasquez  Patient MRN: [id number]  Today's Date: 6/5/2019     Type of session:Individual psychotherapy  Length of session: 50 minutes  Persons in attendance:Patient    Subjective/New Info: pt is missing her mother and she confronted her father's constant negativity    Objective/Observations:   Participation: Active verbal participation   Grooming: Casual   Cognition: Alert   Eye contact: Good   Mood: Depressed and Anxious   Affect: Full range   Thought process: Goal-directed   Speech: Rate within normal limits and Volume within normal limits   Other:     Diagnoses: No diagnosis found.     Current risk:   SUICIDE: Not applicable   Homicide: Not applicable   Self-harm: Not applicable   Relapse: Not applicable   Other:    Safety Plan reviewed? Not Indicated   If evidence of imminent risk is present, intervention/plan:     Therapeutic Intervention(s): Clarify:  Clarify feelings and Clarify thoughts, Cognitive modification, Positive behavior reinforced, Self-care skills, Stressors assessed and Supportive psychotherapy    Treatment Goal(s)/Objective(s) addressed: id pt stressors including feeling like she does it all at work and at home, clarified pt feels depressed and anxious about her life and missing her mother, using cognitive modification id ways she can still \"talk\" to her mom, reinforced positive communication with her father,  and co-workers, encouraged self care and provided support for pt    Progress toward Treatment Goals: Mild improvement    Plan:  - \"Homework\" recommendation: speak out loud when she feels her mothers hand in her life    ESTRELLA Chan  6/5/2019                                   "

## 2019-06-19 ENCOUNTER — OFFICE VISIT (OUTPATIENT)
Dept: BEHAVIORAL HEALTH | Facility: CLINIC | Age: 39
End: 2019-06-19
Payer: COMMERCIAL

## 2019-06-19 DIAGNOSIS — Z63.4 BEREAVEMENT: ICD-10-CM

## 2019-06-19 DIAGNOSIS — F41.8 DEPRESSION WITH ANXIETY: ICD-10-CM

## 2019-06-19 PROCEDURE — 90834 PSYTX W PT 45 MINUTES: CPT | Performed by: MARRIAGE & FAMILY THERAPIST

## 2019-06-19 SDOH — SOCIAL STABILITY - SOCIAL INSECURITY: DISSAPEARANCE AND DEATH OF FAMILY MEMBER: Z63.4

## 2019-06-19 NOTE — BH THERAPY
[facility]  Therapy Progress Note    Patient Name: Lily Vasquez  Patient MRN: [id number]  Today's Date: 6/19/2019     Type of session:Individual psychotherapy  Length of session: 50 minutes  Persons in attendance:Patient    Subjective/New Info: pt's relationship with her father is improving but she still feels excluded from her sibling group    Objective/Observations:   Participation: Active verbal participation   Grooming: Casual   Cognition: Alert   Eye contact: Good   Mood: Depressed and Anxious   Affect: Sad   Thought process: Goal-directed   Speech: Rate within normal limits and Volume within normal limits   Other:     Diagnoses: No diagnosis found.     Current risk:   SUICIDE: Not applicable   Homicide: Not applicable   Self-harm: Not applicable   Relapse: Not applicable   Other:    Safety Plan reviewed? Not Indicated   If evidence of imminent risk is present, intervention/plan:     Therapeutic Intervention(s): Clarify:  Clarify feelings and Clarify thoughts, Cognitive modification, Parenting/familial roles addressed, Self-care skills, Stressors assessed and Supportive psychotherapy    Treatment Goal(s)/Objective(s) addressed: id pt stressors including being around her negative siblings , clarified pt misses her mom b/c her mom included her, addressed family roles and pt is the youngest and does not feel respected by her older sibs and father, using cognitive modification helped pt reframe the exclusion as something positive, encouraged self care and provided support for pt    Progress toward Treatment Goals: Mild improvement    Plan:return for 1:1  - Next appointment scheduled:  8/5/2019    ESTRELLA Chan  6/19/2019

## 2019-06-20 ENCOUNTER — HOSPITAL ENCOUNTER (OUTPATIENT)
Dept: LAB | Facility: MEDICAL CENTER | Age: 39
End: 2019-06-20
Attending: FAMILY MEDICINE
Payer: COMMERCIAL

## 2019-06-20 ENCOUNTER — HOSPITAL ENCOUNTER (OUTPATIENT)
Dept: LAB | Facility: MEDICAL CENTER | Age: 39
End: 2019-06-20
Payer: COMMERCIAL

## 2019-06-20 DIAGNOSIS — Z00.00 PREVENTATIVE HEALTH CARE: ICD-10-CM

## 2019-06-20 LAB
ALBUMIN SERPL BCP-MCNC: 4.4 G/DL (ref 3.2–4.9)
ALBUMIN/GLOB SERPL: 1.5 G/DL
ALP SERPL-CCNC: 55 U/L (ref 30–99)
ALT SERPL-CCNC: 23 U/L (ref 2–50)
ANION GAP SERPL CALC-SCNC: 10 MMOL/L (ref 0–11.9)
AST SERPL-CCNC: 25 U/L (ref 12–45)
BASOPHILS # BLD AUTO: 0.8 % (ref 0–1.8)
BASOPHILS # BLD: 0.05 K/UL (ref 0–0.12)
BDY FAT % MEASURED: 32.4 %
BILIRUB SERPL-MCNC: 0.4 MG/DL (ref 0.1–1.5)
BP DIAS: 52 MMHG
BP SYS: 113 MMHG
BUN SERPL-MCNC: 16 MG/DL (ref 8–22)
CALCIUM SERPL-MCNC: 9.9 MG/DL (ref 8.5–10.5)
CHLORIDE SERPL-SCNC: 104 MMOL/L (ref 96–112)
CHOLEST SERPL-MCNC: 147 MG/DL (ref 100–199)
CHOLEST SERPL-MCNC: 147 MG/DL (ref 100–199)
CO2 SERPL-SCNC: 26 MMOL/L (ref 20–33)
CREAT SERPL-MCNC: 0.88 MG/DL (ref 0.5–1.4)
DIABETES HTDIA: NO
EOSINOPHIL # BLD AUTO: 0.12 K/UL (ref 0–0.51)
EOSINOPHIL NFR BLD: 1.9 % (ref 0–6.9)
ERYTHROCYTE [DISTWIDTH] IN BLOOD BY AUTOMATED COUNT: 47.7 FL (ref 35.9–50)
EVENT NAME HTEVT: NORMAL
FASTING HTFAS: YES
FASTING STATUS PATIENT QL REPORTED: NORMAL
FASTING STATUS PATIENT QL REPORTED: NORMAL
GLOBULIN SER CALC-MCNC: 2.9 G/DL (ref 1.9–3.5)
GLUCOSE SERPL-MCNC: 86 MG/DL (ref 65–99)
GLUCOSE SERPL-MCNC: 86 MG/DL (ref 65–99)
HCT VFR BLD AUTO: 41.1 % (ref 37–47)
HDLC SERPL-MCNC: 45 MG/DL
HDLC SERPL-MCNC: 45 MG/DL
HGB BLD-MCNC: 12.5 G/DL (ref 12–16)
HYPERTENSION HTHYP: NO
IMM GRANULOCYTES # BLD AUTO: 0.02 K/UL (ref 0–0.11)
IMM GRANULOCYTES NFR BLD AUTO: 0.3 % (ref 0–0.9)
LDLC SERPL CALC-MCNC: 74 MG/DL
LDLC SERPL CALC-MCNC: 75 MG/DL
LYMPHOCYTES # BLD AUTO: 2.03 K/UL (ref 1–4.8)
LYMPHOCYTES NFR BLD: 32 % (ref 22–41)
MCH RBC QN AUTO: 26.2 PG (ref 27–33)
MCHC RBC AUTO-ENTMCNC: 30.4 G/DL (ref 33.6–35)
MCV RBC AUTO: 86 FL (ref 81.4–97.8)
MONOCYTES # BLD AUTO: 0.42 K/UL (ref 0–0.85)
MONOCYTES NFR BLD AUTO: 6.6 % (ref 0–13.4)
NEUTROPHILS # BLD AUTO: 3.7 K/UL (ref 2–7.15)
NEUTROPHILS NFR BLD: 58.4 % (ref 44–72)
NRBC # BLD AUTO: 0 K/UL
NRBC BLD-RTO: 0 /100 WBC
PLATELET # BLD AUTO: 354 K/UL (ref 164–446)
PMV BLD AUTO: 10.2 FL (ref 9–12.9)
POTASSIUM SERPL-SCNC: 3.7 MMOL/L (ref 3.6–5.5)
PROT SERPL-MCNC: 7.3 G/DL (ref 6–8.2)
RBC # BLD AUTO: 4.78 M/UL (ref 4.2–5.4)
SCREENING LOC CITY HTCIT: NORMAL
SCREENING LOC STATE HTSTA: NORMAL
SCREENING LOCATION HTLOC: NORMAL
SMOKING HTSMO: NO
SODIUM SERPL-SCNC: 140 MMOL/L (ref 135–145)
SUBSCRIBER ID HTSID: NORMAL
TRIGL SERPL-MCNC: 134 MG/DL (ref 0–149)
TRIGL SERPL-MCNC: 140 MG/DL (ref 0–149)
WBC # BLD AUTO: 6.3 K/UL (ref 4.8–10.8)

## 2019-06-20 PROCEDURE — 85025 COMPLETE CBC W/AUTO DIFF WBC: CPT

## 2019-06-20 PROCEDURE — 82947 ASSAY GLUCOSE BLOOD QUANT: CPT

## 2019-06-20 PROCEDURE — 80061 LIPID PANEL: CPT | Mod: 91

## 2019-06-20 PROCEDURE — S5190 WELLNESS ASSESSMENT BY NONPH: HCPCS

## 2019-06-20 PROCEDURE — 80053 COMPREHEN METABOLIC PANEL: CPT

## 2019-06-20 PROCEDURE — 36415 COLL VENOUS BLD VENIPUNCTURE: CPT

## 2019-06-20 PROCEDURE — 80061 LIPID PANEL: CPT

## 2019-06-25 ENCOUNTER — NON-PROVIDER VISIT (OUTPATIENT)
Dept: HEALTH INFORMATION MANAGEMENT | Facility: MEDICAL CENTER | Age: 39
End: 2019-06-25
Payer: COMMERCIAL

## 2019-06-25 VITALS — HEIGHT: 62 IN | BODY MASS INDEX: 33.36 KG/M2 | WEIGHT: 181.3 LBS

## 2019-06-25 DIAGNOSIS — E66.9 OBESITY (BMI 30.0-34.9): ICD-10-CM

## 2019-06-25 DIAGNOSIS — K76.0 FATTY LIVER: ICD-10-CM

## 2019-06-25 PROCEDURE — 97803 MED NUTRITION INDIV SUBSEQ: CPT | Performed by: DIETITIAN, REGISTERED

## 2019-07-02 ENCOUNTER — OFFICE VISIT (OUTPATIENT)
Dept: HEALTH INFORMATION MANAGEMENT | Facility: MEDICAL CENTER | Age: 39
End: 2019-07-02
Payer: COMMERCIAL

## 2019-07-02 VITALS
BODY MASS INDEX: 33.25 KG/M2 | WEIGHT: 180.7 LBS | OXYGEN SATURATION: 96 % | DIASTOLIC BLOOD PRESSURE: 80 MMHG | HEART RATE: 89 BPM | HEIGHT: 62 IN | SYSTOLIC BLOOD PRESSURE: 118 MMHG

## 2019-07-02 DIAGNOSIS — E28.2 PCOS (POLYCYSTIC OVARIAN SYNDROME): ICD-10-CM

## 2019-07-02 DIAGNOSIS — F51.04 CHRONIC INSOMNIA: ICD-10-CM

## 2019-07-02 DIAGNOSIS — G47.31 CENTRAL SLEEP APNEA: ICD-10-CM

## 2019-07-02 DIAGNOSIS — E55.9 VITAMIN D INSUFFICIENCY: ICD-10-CM

## 2019-07-02 PROCEDURE — 99214 OFFICE O/P EST MOD 30 MIN: CPT | Performed by: INTERNAL MEDICINE

## 2019-07-02 NOTE — PROGRESS NOTES
"Bariatric Medicine Follow Up  Chief Complaint   Patient presents with   • Weight Gain       History of Present Illness:   Lily Vasquez is a 39 y.o. female who presents for weight management follow-up and to help address co-morbidities caused by overweight, as below.    During the patient's last visit, the following were discussed and recommended:  Weight Goal: 3-5% wt loss each month (pt goal weight is 135 lb)  Diet:   Continues PP qam and same lunch  Work on easy, less stressful dinners, better planning and spreadsheet  Physical Activity: Walking at work  Risk level for moderate/vigorous exercise program: Low  New Rx: Strongly consider phentermine, Contrave or liraglutide  Patient to consider  Cannot take OCPs so best to avoid topiramate.  Behavior change: Consider behavioral health counseling, commit more to change  Follow-up: 6 wks    Stopped soda again.  The more physically active, the more she wants to eat better. PP qam, salad with eggs for lunch.  Same snacks.  No longer snacking when she gets home after work, has right to her workouts.    JAN:  Waking up less frequently  PCOS:  Continues metformin    Exercise:   Cancelled gym membership but workouts at home, wts  3 d/wk and squats, jump rope  Walking more     Review of Systems   Denies lightheadedness, worsening fatigue.  Insomnia improved.  All other ROS were reviewed and are otherwise unchanged from my previous visit with patient.    Physical Exam:    /80 (BP Location: Left arm, Patient Position: Sitting, BP Cuff Size: Adult)   Pulse 89   Ht 1.575 m (5' 2\")   Wt 82 kg (180 lb 11.2 oz)   SpO2 96%   BMI 33.05 kg/m²   Waist Measurement   Waist: 37.75 inch/inches  Weight change since last visit: 0 lb (-8 lb total)  Waist Circum change since last visit: 0 in (-4 in total)    Constitutional: Oriented to person, place, and time and well-developed, well-nourished, and in no distress.    Head: Normocephalic.   Musculoskeletal: Normal range of motion. No " edema.   Neurological: Alert and oriented to person, place, and time. No muscle weakness.  Gait normal.   Skin: Warm and dry. Not diaphoretic.   Psychiatric: Mood, memory, affect and judgment normal.     Laboratory:   Recent labs reviewed.      ASSESSMENT/PLAN:  Body mass index is 33.05 kg/m².    Obesity Stage ([place]): 2; Class 1    1. PCOS (polycystic ovarian syndrome)     2. Chronic insomnia     3. Central sleep apnea     4. Vitamin D insufficiency       Although the patient has not lost weight since her last visit, metabolically she is healthier.  Fat mass percent much improved, improving skeletal muscle mass.  Mood much improved, sleeping better.  Continue metformin for PCOS.  Monitor vitamin D levels as she progresses through the program.    The patient and I have discussed at length and agree to the following recommendations, which are all addressing the above diagnoses:    Weight Goal: 3-5% wt loss each month (pt goal weight is 135 lb)  Diet: PP qam, salad with egg for lunch, same snacks  Goal is to stay off soda, reduce snacking after dinner  Physical Activity:  Home workouts 3d/wk, right when she gets home!  Risk level for moderate/vigorous exercise program: low  New Rx: none  Behavior change: work on reducing snacking  Follow-up: 6 wks  Not currently seeing RD    Patient's body mass index is 33.05 kg/m². Exercise and nutrition counseling were performed at this visit.

## 2019-07-24 ENCOUNTER — NON-PROVIDER VISIT (OUTPATIENT)
Dept: HEALTH INFORMATION MANAGEMENT | Facility: MEDICAL CENTER | Age: 39
End: 2019-07-24
Payer: COMMERCIAL

## 2019-07-24 VITALS — WEIGHT: 182 LBS | HEIGHT: 62 IN | BODY MASS INDEX: 33.49 KG/M2

## 2019-07-24 DIAGNOSIS — K76.0 FATTY LIVER: ICD-10-CM

## 2019-07-24 PROCEDURE — 97803 MED NUTRITION INDIV SUBSEQ: CPT | Performed by: DIETITIAN, REGISTERED

## 2019-07-24 NOTE — PROGRESS NOTES
"Nutrition Reassess: Medical Weight Management   Today's date: 7/24/2019  Referring Provider: No ref. provider found      Time: in/out 7:53-8:13am   Visit#: 17    Subjective:  -Lily states she has not had any fast food to 5 days   -She is bringing lunch with her but admits she is worried about getting burnt out from eating the same thing   -She also is worried about eating something different , that it will result in weight gain   -Is cooking more dinner at home   -Still struggles with emotional/stress eating after work   -Has a PM snack while driving home (string cheese and veggies) but doesn't eat it mindfully        Anthropometrics/Objective  Today's weight:    Vitals:    07/24/19 0814   Weight: 82.6 kg (182 lb)   Height: 1.575 m (5' 2\")     BMI:  Body mass index is 33.29 kg/m².  Starting weight/date 188.8lb     Total weight change : -6.8lb            Meal Plan:   High protein low carb diet with 1 meal replacements per day for breakfast     ReAssesment/Notes:  Discussed the below goals today     Goals:   1. Try 1 meal lunch idea per week -- plug in to my fitness pal to have the same or similar macros are current lunch   2. Eat PM snack before leaving work rather than while driving (mindful eating)   3. Water plants when you get home from work to avoid snacking   4. Continue to avoid fast food and cook meals at home   5. Exercise 3 days per week     Follow-up: 4 weeks with RD    "

## 2019-08-21 ENCOUNTER — APPOINTMENT (OUTPATIENT)
Dept: HEALTH INFORMATION MANAGEMENT | Facility: MEDICAL CENTER | Age: 39
End: 2019-08-21
Payer: COMMERCIAL

## 2019-08-26 ENCOUNTER — OFFICE VISIT (OUTPATIENT)
Dept: BEHAVIORAL HEALTH | Facility: CLINIC | Age: 39
End: 2019-08-26
Payer: COMMERCIAL

## 2019-08-26 DIAGNOSIS — F41.8 DEPRESSION WITH ANXIETY: ICD-10-CM

## 2019-08-26 DIAGNOSIS — Z71.89 COUNSELING FOR BEREAVEMENT: ICD-10-CM

## 2019-08-26 PROCEDURE — 90834 PSYTX W PT 45 MINUTES: CPT | Performed by: MARRIAGE & FAMILY THERAPIST

## 2019-08-26 NOTE — BH THERAPY
" [facility]  Therapy Progress Note    Patient Name: Lily Vasquez  Patient MRN: [id number]  Today's Date: 8/26/2019     Type of session:Individual psychotherapy  Length of session: 50 minutes  Persons in attendance:Patient    Subjective/New Info: the 2nd year of her mother tragic passing is coming up in 3 weeks    Objective/Observations:   Participation: Active verbal participation   Grooming: Casual   Cognition: Alert   Eye contact: Good   Mood: Depressed and Anxious   Affect: Full range   Thought process: Goal-directed   Speech: Rate within normal limits and Volume within normal limits   Other:     Diagnoses: No diagnosis found.     Current risk:   SUICIDE: Not applicable   Homicide: Not applicable   Self-harm: Not applicable   Relapse: Not applicable   Other:    Safety Plan reviewed? Not Indicated   If evidence of imminent risk is present, intervention/plan:     Therapeutic Intervention(s): Clarify:  Clarify feelings and Clarify thoughts, Cognitive modification, Positive behavior reinforced, Self-care skills, Stressors assessed and Supportive psychotherapy    Treatment Goal(s)/Objective(s) addressed: id pt stressors including the upcoming 2nd anniversary of her mother's tragic accidental death, clarified pt is grieving and thinks of her mother every day, using cognitive mod helped pt explore her grieving process about her mom over these past 2 years, reinforced pt's openness to see/hear and/or experience her mom in another form, encouraged self care and provided support for pt    Progress toward Treatment Goals: Mild improvement    Plan:  - \"Homework\" recommendation: continue talking to her mom and create a ritual on her anniversary to honor her life    ESTRELLA Chan  8/26/2019                                     "

## 2019-08-28 ENCOUNTER — SLEEP CENTER VISIT (OUTPATIENT)
Dept: SLEEP MEDICINE | Facility: MEDICAL CENTER | Age: 39
End: 2019-08-28
Payer: COMMERCIAL

## 2019-08-28 VITALS
SYSTOLIC BLOOD PRESSURE: 108 MMHG | HEART RATE: 89 BPM | RESPIRATION RATE: 14 BRPM | OXYGEN SATURATION: 96 % | HEIGHT: 62 IN | DIASTOLIC BLOOD PRESSURE: 70 MMHG | BODY MASS INDEX: 34.04 KG/M2 | WEIGHT: 185 LBS

## 2019-08-28 DIAGNOSIS — G25.81 RESTLESS LEG SYNDROME: ICD-10-CM

## 2019-08-28 DIAGNOSIS — F51.04 CHRONIC INSOMNIA: ICD-10-CM

## 2019-08-28 DIAGNOSIS — G47.31 COMPLEX SLEEP APNEA SYNDROME: ICD-10-CM

## 2019-08-28 DIAGNOSIS — G47.10 HYPERSOMNOLENCE: ICD-10-CM

## 2019-08-28 PROCEDURE — 99214 OFFICE O/P EST MOD 30 MIN: CPT | Performed by: INTERNAL MEDICINE

## 2019-08-28 NOTE — PROGRESS NOTES
CC: Sleep apnea hypopnea syndrome.     HPI:   Ms. Vasquez was evaluated here in May for snoring and excessive daytime somnolence.  A split-night polysomnogram on June 9, 2018 demonstrated an apnea hypopnea index of 29.6 events per hour and a lowest arterial oxygen saturation of 86% on room air.  There was a good response to CPAP at 7 cm of water pressure with some treatment onset central apneas but suppression of hypopnea events and preservation of arterial oxygen saturation.     Her response to CPAP at 7 cm of water was suboptimal.  When she was seen on October 18, 2018 she noted persistent fatigue and daytime somnolence.  The CPAP data recording chip suggested residual AHI of 29.6 events per hour.  A titration polysomnogram on November 16, 2018 demonstrated mild fragmentation of sleep related to increased sleep onset latency.  There are only rare periodic limb movements without arousal.  CPAP was applied at 7-11 cm of water with persistence of hypopnea and central apnea episodes.  BiPAP was then applied at 13-15/9-11 cm water pressure.  In the final pressure stage, the apnea hypopnea index was 0 with a minimum arterial oxygen saturation of 91% but no REM sleep time was included in that stage and no supine sleep time was recorded.  In the penultimate pressure stage there was a single episode of hypopnea and 5 episodes of central apnea in a stage that included REM sleep time.     At the last visit BiPAP was initiated at 15/11 cm water pressure using a small air fit F 20 fullface mask.  She has a long history of chronic insomnia and that problem has limited her use of the BiPAP.  She is using machine most nights but finds it difficult to tolerate and throughout the night.  She continues to have significant daytime somnolence.  Her typical bedtime is about 10 PM and she arises at about 4:30 AM.  She notes that her daytime sleepiness is much better if she has used the BiPAP on the preceding night.     The data recording  information is reviewed with the patient.  It demonstrates use on 19 over the last 30 days with an average daily usage of 5 hours and 13 minutes.  Leak is minimal and the estimated residual apnea hypopnea index is 5.1 events per hour.        Patient Active Problem List    Diagnosis Date Noted   • Partner relationship problem 11/12/2018     Priority: Medium     Class: Chronic   • Bereavement 07/10/2018     Priority: Medium     Class: Chronic   • Eating disorder 07/10/2018     Priority: Low     Class: Chronic   • Depression with anxiety 06/05/2019     Class: Chronic   • Central sleep apnea 06/15/2018   • RLS (restless legs syndrome) 05/23/2018   • Chronic insomnia 05/23/2018   • Hiatal hernia 01/03/2018   • Obesity (BMI 30.0-34.9) 01/03/2018   • PCOS (polycystic ovarian syndrome) 01/03/2018   • TMJ (temporomandibular joint syndrome)    • Vitamin D insufficiency 12/07/2010   • Preventative health care 12/07/2010   • Fatty liver    • GERD (gastroesophageal reflux disease) 09/09/2009   • Hemorrhoid 09/09/2009       Past Medical History:   Diagnosis Date   • Anesthesia     Severe PONV   • Arthritis     TMJ   • Bronchitis    • Chickenpox    • Dental disorder     permanent retainer   • Eating disorder 7/10/2018   • Fatty liver    • GERD (gastroesophageal reflux disease)    • Gestational diabetes    • Hemorrhoids    • Hiatus hernia syndrome    • Indigestion     GERD   • Influenza    • Neck pain    • Obesity    • Vitamin D deficiency        Past Surgical History:   Procedure Laterality Date   • LISBET BY LAPAROSCOPY  12/18/2012    Performed by Idania Camara M.D. at SURGERY [facility] ORS   • LARYNGOSCOPY  7/6/2011    Performed by EDNA MINA at SURGERY SAME DAY [facility] ORS   • ESOPHAGOSCOPY  7/6/2011    Performed by EDNA MINA at SURGERY SAME DAY [facility] ORS   • TONSILLECTOMY AND ADENOIDECTOMY  1991   • OTHER  1991    TONSILLECTOMY    • CHOLECYSTECTOMY         Family History   Problem Relation Age of Onset    • Arthritis Mother    • Hypertension Mother    • Hyperlipidemia Mother    • Sleep Apnea Mother    • Psychiatric Illness Father    • Alcohol abuse Father    • Thyroid Sister    • Heart Disease Maternal Grandmother    • Heart Disease Maternal Grandfather    • Heart Disease Paternal Grandmother    • Diabetes Paternal Grandmother    • Cancer Paternal Grandfather         brain   • Stroke Unknown        Social History     Socioeconomic History   • Marital status:      Spouse name: Not on file   • Number of children: Not on file   • Years of education: Not on file   • Highest education level: Not on file   Occupational History   • Not on file   Social Needs   • Financial resource strain: Not on file   • Food insecurity:     Worry: Not on file     Inability: Not on file   • Transportation needs:     Medical: Not on file     Non-medical: Not on file   Tobacco Use   • Smoking status: Never Smoker   • Smokeless tobacco: Never Used   Substance and Sexual Activity   • Alcohol use: No   • Drug use: No   • Sexual activity: Yes     Partners: Male     Birth control/protection: Condom     Comment: , works at [employer]   Lifestyle   • Physical activity:     Days per week: Not on file     Minutes per session: Not on file   • Stress: Not on file   Relationships   • Social connections:     Talks on phone: Not on file     Gets together: Not on file     Attends [employer] service: Not on file     Active member of club or organization: Not on file     Attends meetings of clubs or organizations: Not on file     Relationship status: Not on file   • Intimate partner violence:     Fear of current or ex partner: Not on file     Emotionally abused: Not on file     Physically abused: Not on file     Forced sexual activity: Not on file   Other Topics Concern   • Not on file   Social History Narrative   • Not on file       Current Outpatient Medications   Medication Sig Dispense Refill   • metFORMIN (GLUCOPHAGE) 500 MG Tab TAKE  "1 TABLET BY MOUTH TWICE A DAY WITH MEALS 60 Tab 1   • calcium carbonate (TUMS) 500 MG Chew Tab Take 500 mg by mouth every day.     • ibuprofen (MOTRIN) 200 MG Tab Take 200 mg by mouth every 6 hours as needed.       No current facility-administered medications for this visit.     \"CURRENT RX\"      Allergies: Codeine; Morphine; Sulfa drugs; and Tape      ROS  Unchanged from prior.      Physical Exam:   /70 (BP Location: Right arm, Patient Position: Sitting, BP Cuff Size: Adult)   Pulse 89   Resp 14   Ht 1.575 m (5' 2\")   Wt 83.9 kg (185 lb)   SpO2 96%   BMI 33.84 kg/m²    Head and neck examination demonstrates no mucosal lesion, purulent drainage or evident polyps. The pharynx is benign with a Mallampati III presentation. The neck is supple without thyromegaly. On chest examination there are symmetrical bilateral breath sounds without rales, wheezing or consolidation. On cardiac examination, the apical impulse and heart sounds are normal and the rhythm is regular. There is no murmur, gallop or rub and no jugular venous distention. The abdomen is soft with active bowel sounds and no palpable hepatosplenomegaly, mass, guarding or rebound. The extremities show no clubbing, cyanosis or edema and no signs of deep venous thrombosis. There is no warmth, redness, tenderness or palpable venous cord in the calves. The skin is clear, warm and dry. There is no unusual peripheral lymphadenopathy. Peripheral pulses are palpable in all 4 extremities. On neurologic examination, cranial nerve function is intact, motor tone is symmetrical, and the patient is alert, oriented and responsive.       Problems:  1. Complex sleep apnea syndrome  She has severe sleep apnea hypopnea with an apnea hypopnea index of 29.6 events per hour and a lowest arterial oxygen saturation of 86% on room air.  Treatment with CPAP was unsuccessful and the titration studies documented the evolution of persisting central apnea episodes on CPAP, " consistent with complex sleep apnea.  She did well on BiPAP in a follow up polysomnogram using somewhat higher pressures with effective suppression of both central and obstructive episodes and preservation of arterial oxygen saturation.  Effective treatment is required only to improve levels of daytime alertness but also to reduce the cardiac and neurologic risks associated with untreated sleep apnea hypopnea.   In part because of her chronic insomnia she has not fully acclimated to the BiPAP although she is managing to use it for more than 60% of the days in the last month with an average daily usage of over 5 hours.  She does not seem to have problems with mask fit or other technical issues with the treatment.    2. Hypersomnolence  Related both to the sleep disordered breathing and to restricted sleep time associated with chronic insomnia.    3. Chronic insomnia  With both sleep onset and sleep maintenance elements, exacerbated by the adjustment to BiPAP therapy.  We have talked about treatment of chronic insomnia and particularly about cognitive behavioral therapy for insomnia.  We have also talked about the risks and benefits of prescription soporific agents and I would agree with her reluctance to begin treatment with medications at this time.    4. Restless leg syndrome  Her serum ferritin level was low and she remains on over-the-counter iron supplements at 325 mg a day.    5. BMI 33.0-33.9,adult  She has gained 11 pounds since last office visit.  We talked about the role of weight management in the treatment of sleep disordered breathing and the ways in which that might be accomplished.  She has a history of polycystic ovarian syndrome and is working with the bariatric health improvement program.      Plan:   1.  We have again reviewed the principles of sleep hygiene with particular attention to regular sleep-wake schedule and the goal of 7.5 hours of sleep nightly.  We have talked again about cognitive  behavioral therapy.    2.  Continue BiPAP at 15/11 cm water pressure with the small air fit F 20 full facemask.    3.  Continue iron supplements of 325 mg a day.  Follow-up iron studies and ferritin.    4.  Return visit here in 6 months, or sooner if new problems develop.    We appreciate the opportunity to assist in her care.

## 2019-09-16 ENCOUNTER — OFFICE VISIT (OUTPATIENT)
Dept: HEALTH INFORMATION MANAGEMENT | Facility: MEDICAL CENTER | Age: 39
End: 2019-09-16
Payer: COMMERCIAL

## 2019-09-16 VITALS
HEART RATE: 71 BPM | BODY MASS INDEX: 33.99 KG/M2 | SYSTOLIC BLOOD PRESSURE: 116 MMHG | HEIGHT: 62 IN | DIASTOLIC BLOOD PRESSURE: 70 MMHG | OXYGEN SATURATION: 99 % | WEIGHT: 184.7 LBS

## 2019-09-16 DIAGNOSIS — E66.9 OBESITY (BMI 30.0-34.9): ICD-10-CM

## 2019-09-16 DIAGNOSIS — F41.8 DEPRESSION WITH ANXIETY: ICD-10-CM

## 2019-09-16 DIAGNOSIS — E55.9 VITAMIN D INSUFFICIENCY: ICD-10-CM

## 2019-09-16 DIAGNOSIS — F51.04 CHRONIC INSOMNIA: ICD-10-CM

## 2019-09-16 DIAGNOSIS — K21.9 GASTROESOPHAGEAL REFLUX DISEASE WITHOUT ESOPHAGITIS: ICD-10-CM

## 2019-09-16 DIAGNOSIS — G47.31 CENTRAL SLEEP APNEA: ICD-10-CM

## 2019-09-16 PROCEDURE — 99214 OFFICE O/P EST MOD 30 MIN: CPT | Performed by: INTERNAL MEDICINE

## 2019-09-16 NOTE — PROGRESS NOTES
"Bariatric Medicine Follow Up  Chief Complaint   Patient presents with   • Weight Gain       History of Present Illness:   Lily Vasquez is a 39 y.o. female who presents for weight management follow-up and to help address co-morbidities caused by overweight, as below.    During the patient's last visit, the following were discussed and recommended:  Weight Goal: 3-5% wt loss each month (pt goal weight is 135 lb)  Diet: PP qam, salad with egg for lunch, same snacks  Goal is to stay off soda, reduce snacking after dinner  Physical Activity:  Home workouts 3d/wk, right when she gets home!  Risk level for moderate/vigorous exercise program: low  New Rx: none  Behavior change: work on reducing snacking  Follow-up: 6 wks    Struggling with emotional eating. Making some better food choices but at times, just wants to eat fast food.  Still having Pp qam.  Still drinking a lot of soda and snacking later.    She is getting very frustrated she cannot get back to motivation for losing wt and keeping it off anymore.  Too busy and stressed with work and family.    PCOS:  Not taking metformin daily  JAN:  Sleep stable with Bipap  VDD: not taking Vit D  GERD:  Does not want to take H2B or PPI    Exercise:   Walking but inconsistent     Review of Systems   Stress level high.  Insomnia.  Denies worsening anxiety or depression.  All other ROS were reviewed and are otherwise unchanged from my previous visit with patient.    Physical Exam:    /70 (BP Location: Left arm, Patient Position: Sitting, BP Cuff Size: Large adult)   Pulse 71   Ht 1.575 m (5' 2\")   Wt 83.8 kg (184 lb 11.2 oz)   SpO2 99%   BMI 33.78 kg/m²   Waist Measurement   Waist: 40 inch/inches  Weight change since last visit:  +4 lb (-4 lb total)  Waist Circum change since last visit: +2 in (-1.5 in total)    Constitutional: Oriented to person, place, and time and well-developed, well-nourished, and in no distress.    Head: Normocephalic.   Musculoskeletal: Normal " range of motion. No edema.   Neurological: Alert and oriented to person, place, and time. No muscle weakness.  Gait normal.   Skin: Warm and dry. Not diaphoretic.   Psychiatric: Mood, memory, affect and judgment normal.     Laboratory:   Recent labs reviewed.      Dietitian Assessment: Not seeing RD    ASSESSMENT/PLAN:  Body mass index is 33.78 kg/m².    Obesity Stage ([place]):  2; Class 1    1. Obesity (BMI 30.0-34.9)     2. Chronic insomnia     3. Central sleep apnea     4. Depression with anxiety     5. Vitamin D insufficiency     6. Gastroesophageal reflux disease without esophagitis       The patient is struggling with some weight regain, stress levels running high.  Continue behavioral health counseling.  Strongly consider adding Wellbutrin.  Consider PPI for GERD, vitamin D supplementation.  Continue BiPAP for JAN.  Increase activity level.  May benefit from making ONE change this month in her lifestyle, toward her weight loss goal, perhaps less stressful for her.    The patient and I have discussed at length and agree to the following recommendations, which are all addressing the above diagnoses:    Weight Goal: 3-5% wt loss each month (pt goal weight is 135 lb)  Diet: PP qam  More frozen meals for later in the work week when tired   Try again to reduce diet soda intake, night snacking  Make ONE change this month  Physical Activity:  Walking or running  Risk level for moderate/vigorous exercise program: low  New Rx: Consider wellbutrin, pt to research  Side Effects: consent in chart  Behavior change: work on stress reduction, continues with Behavioral Health but less often  Follow-up: 2 mo    Patient's body mass index is 33.78 kg/m². Exercise and nutrition counseling were performed at this visit.

## 2019-09-19 ENCOUNTER — NON-PROVIDER VISIT (OUTPATIENT)
Dept: HEALTH INFORMATION MANAGEMENT | Facility: MEDICAL CENTER | Age: 39
End: 2019-09-19
Payer: COMMERCIAL

## 2019-09-19 DIAGNOSIS — K76.0 FATTY LIVER: ICD-10-CM

## 2019-09-19 PROCEDURE — 97803 MED NUTRITION INDIV SUBSEQ: CPT | Performed by: DIETITIAN, REGISTERED

## 2019-09-19 NOTE — PROGRESS NOTES
"Nutrition Reassess: Medical Weight Management   Today's date: 9/19/2019  Referring Provider: No ref. provider found      Time: in/out 1027 - 104  Visit#: 18    Subjective:  -Stress and time have been big hurdles recently and will not improve until January  -Is still consuming her protein shake for breakfast  -Lunch and/or dinner has become fast food 2-3x/week because she does not want to cook or forgets to bring her lunch to work or loses motivation     Anthropometrics/Objective  Today's weight:  184.7# (9/16/19)  Height:  62\"  BMI:  33.8  Starting weight/date 188.8#     Total weight change : - 4.1#            Meal Plan:   High protein with 1 meal replacements per day     ReAssesment/Notes:    Lily and I came up with some frozen meal options that aren't ideal and they are better than fast food for lunch, which she agrees with; she is going to look for Lean Cuisine, Health Choice, Smart Ones, etc with 40 grams of CHO or less.  This may be more than we want her to eat at a meal and it is better than the amount she will eat at fast food.  She will use the frozen meals as \"emergency\" meals when she forgets her lunch.    She is also agreeable to using some more quick-to-prepare foods like frozen vegetables/bagged lettuce, rotisserie chicken, frozen brown rice as a quick meal from home because she would like to see her family, and herself, eating better.      Finally, I want her to try to get some physical activity during the day to try to help with her stress.  She knows that she does not need to do all of the work that she does each day and can leave it for the next day, in fact, she has coworkers tell her to stop doing so much because she is so stressed.  This is something behavioral that she is going to have to learn and we can help her as she continues in this program.    Follow-up: 1 month    "

## 2019-09-27 ENCOUNTER — IMMUNIZATION (OUTPATIENT)
Dept: OCCUPATIONAL MEDICINE | Facility: CLINIC | Age: 39
End: 2019-09-27

## 2019-09-27 DIAGNOSIS — Z23 NEED FOR VACCINATION: ICD-10-CM

## 2019-09-27 PROCEDURE — 90686 IIV4 VACC NO PRSV 0.5 ML IM: CPT | Performed by: PREVENTIVE MEDICINE

## 2019-10-30 ENCOUNTER — APPOINTMENT (OUTPATIENT)
Dept: BEHAVIORAL HEALTH | Facility: CLINIC | Age: 39
End: 2019-10-30
Payer: COMMERCIAL

## 2019-11-07 ENCOUNTER — OFFICE VISIT (OUTPATIENT)
Dept: HEALTH INFORMATION MANAGEMENT | Facility: MEDICAL CENTER | Age: 39
End: 2019-11-07
Payer: COMMERCIAL

## 2019-11-07 VITALS
OXYGEN SATURATION: 96 % | WEIGHT: 185.8 LBS | DIASTOLIC BLOOD PRESSURE: 70 MMHG | HEART RATE: 74 BPM | BODY MASS INDEX: 34.19 KG/M2 | HEIGHT: 62 IN | SYSTOLIC BLOOD PRESSURE: 120 MMHG

## 2019-11-07 DIAGNOSIS — E55.9 VITAMIN D INSUFFICIENCY: ICD-10-CM

## 2019-11-07 DIAGNOSIS — F41.8 DEPRESSION WITH ANXIETY: ICD-10-CM

## 2019-11-07 DIAGNOSIS — G47.31 CENTRAL SLEEP APNEA: ICD-10-CM

## 2019-11-07 DIAGNOSIS — E66.9 OBESITY (BMI 30.0-34.9): ICD-10-CM

## 2019-11-07 PROBLEM — Z98.84 HISTORY OF BARIATRIC SURGERY: Status: ACTIVE | Noted: 2019-11-07

## 2019-11-07 PROBLEM — Z98.84 HISTORY OF BARIATRIC SURGERY: Status: RESOLVED | Noted: 2019-11-07 | Resolved: 2019-11-07

## 2019-11-07 PROCEDURE — 99214 OFFICE O/P EST MOD 30 MIN: CPT | Performed by: INTERNAL MEDICINE

## 2019-11-07 NOTE — PROGRESS NOTES
"Bariatric Medicine Follow Up  Chief Complaint   Patient presents with   • Weight Gain       History of Present Illness:   Lily Vasquez is a 39 y.o. female who presents for weight management follow-up and to help address co-morbidities caused by overweight, as below.    During the patient's last visit, the following were discussed and recommended:  Weight Goal: 3-5% wt loss each month (pt goal weight is 135 lb)  Diet: PP qam  More frozen meals for later in the work week when tired   Try again to reduce diet soda intake, night snacking  Make ONE change this month  Physical Activity:  Walking or running  Risk level for moderate/vigorous exercise program: low  New Rx: Consider wellbutrin, pt to research  Side Effects: consent in chart  Behavior change: work on stress reduction, continues with Behavioral Health but less often    The last 3 wks, cut junk food intake way down and consuming less soda.  Requiring less TUMS, GERD much improved.  Letting herself get less stressed at work.  Having frozen meal for lunch if forgets at work, not skipping.  PP constant in am.    Depression:  Afraid she will not remember to take Wellbutrin.  Mood has improved significantly in the last month.  JAN: Sleep improved  VDD: Not currently requiring vitamin D supplement    Exercise:   None but in contemplating, planning     Review of Systems   Denies lightheadedness, worsening fatigue or insomnia, anxiety  All other ROS were reviewed and are otherwise unchanged from my previous visit with patient.    Physical Exam:    /70 (BP Location: Left arm, Patient Position: Sitting, BP Cuff Size: Large adult)   Pulse 74   Ht 1.575 m (5' 2\")   Wt 84.3 kg (185 lb 12.8 oz)   SpO2 96%   BMI 33.98 kg/m²   Waist Measurement   Waist: 40 inch/inches  Weight change since last visit: +1 lb (-3 lb total)  Waist Circum change since last visit: 0 in (-1.5 in total)    Constitutional: Oriented to person, place, and time and well-developed, " well-nourished, and in no distress.    Head: Normocephalic.   Musculoskeletal: Normal range of motion. No edema.   Neurological: Alert and oriented to person, place, and time. No muscle weakness.  Gait normal.   Skin: Warm and dry. Not diaphoretic.   Psychiatric: Mood, memory, affect and judgment normal.     Laboratory:   Recent labs reviewed.      Dietitian Assessment: None today     ASSESSMENT/PLAN:  Body mass index is 33.98 kg/m².    Obesity Stage ([place]):  2; Class 1    1. Obesity (BMI 30.0-34.9)     2. Central sleep apnea     3. Depression with anxiety     4. Vitamin D insufficiency       The patient is doing well.  She seems in a much better place mentally, less anxious and more thoughtful about her food choices.  She feels marked improvement with reducing refined carbohydrates, sodas especially in hopes to improve on that in the coming month.  Although she has not lost weight since her last visit, she feels she is hopeful about her progress in the next year.  Declines antidepressant.  Suggest continuing with behavioral health.  Sleep stable.  Monitor vitamin D levels.    The patient and I have discussed at length and agree to the following recommendations, which are all addressing the above diagnoses:    Weight Goal: 3-5% wt loss each month (pt goal weight is 135 lb)  Diet: Continues consistent PP in am  Frozen meal for lunch instead of junk food, focus on meal prep  Less fast food as she is now doing, with 's suppport  Her goal is NO SODA next month  Physical Activity:  Wants treadmill at home for [place]  New Rx: None  Pt declines Wellbutrin, thinks she will forget to take  Side Effects: consent in chart  Behavior change: Continue with Behavioral Health  Follow-up: 6 wks    Patient's body mass index is 33.98 kg/m². Exercise and nutrition counseling were performed at this visit.

## 2019-11-20 ENCOUNTER — APPOINTMENT (OUTPATIENT)
Dept: BEHAVIORAL HEALTH | Facility: CLINIC | Age: 39
End: 2019-11-20
Payer: COMMERCIAL

## 2019-11-26 ENCOUNTER — HOSPITAL ENCOUNTER (OUTPATIENT)
Dept: RADIOLOGY | Facility: MEDICAL CENTER | Age: 39
End: 2019-11-26
Attending: OBSTETRICS & GYNECOLOGY
Payer: COMMERCIAL

## 2019-11-26 DIAGNOSIS — N92.0 EXCESSIVE OR FREQUENT MENSTRUATION: ICD-10-CM

## 2019-11-26 DIAGNOSIS — N94.6 DYSMENORRHEA: ICD-10-CM

## 2019-11-26 PROCEDURE — 76830 TRANSVAGINAL US NON-OB: CPT

## 2019-12-04 ENCOUNTER — OFFICE VISIT (OUTPATIENT)
Dept: BEHAVIORAL HEALTH | Facility: CLINIC | Age: 39
End: 2019-12-04
Payer: COMMERCIAL

## 2019-12-04 DIAGNOSIS — F41.8 DEPRESSION WITH ANXIETY: ICD-10-CM

## 2019-12-04 DIAGNOSIS — Z63.4 BEREAVEMENT: ICD-10-CM

## 2019-12-04 PROCEDURE — 90834 PSYTX W PT 45 MINUTES: CPT | Performed by: MARRIAGE & FAMILY THERAPIST

## 2019-12-04 SDOH — SOCIAL STABILITY - SOCIAL INSECURITY: DISSAPEARANCE AND DEATH OF FAMILY MEMBER: Z63.4

## 2019-12-04 NOTE — BH THERAPY
" [facility]  Therapy Progress Note    Patient Name: Lily Vasquez  Patient MRN: [id number]  Today's Date: 12/4/2019     Type of session:Individual psychotherapy  Length of session: 45 minutes  Persons in attendance:Patient    Subjective/New Info: pt is stressed at work and misses her mom    Objective/Observations:   Participation: Active verbal participation   Grooming: Casual   Cognition: Alert   Eye contact: Good   Mood: Depressed   Affect: Flat   Thought process: Goal-directed   Speech: Rate within normal limits and Volume within normal limits   Other:     Diagnoses: No diagnosis found.     Current risk:   SUICIDE: Not applicable   Homicide: Not applicable   Self-harm: Not applicable   Relapse: Not applicable   Other:    Safety Plan reviewed? Not Indicated   If evidence of imminent risk is present, intervention/plan:     Therapeutic Intervention(s): Clarify:  Clarify feelings and Clarify thoughts, Cognitive modification, Positive behavior reinforced, Self-care skills, Stressors assessed and Supportive psychotherapy    Treatment Goal(s)/Objective(s) addressed: id pt stressors including over worked, clarified pt misses her mom, using cognitive mod helped pt grieve, reinforced positive letting go behaviors, encouraged cont self care and provided support for pt    Progress toward Treatment Goals: Mild improvement    Plan:  - \"Homework\" recommendation: be gentle with self    ESTRELLA Chan  12/4/2019                                     "

## 2020-01-08 ENCOUNTER — OFFICE VISIT (OUTPATIENT)
Dept: HEALTH INFORMATION MANAGEMENT | Facility: MEDICAL CENTER | Age: 40
End: 2020-01-08
Payer: COMMERCIAL

## 2020-01-08 VITALS
DIASTOLIC BLOOD PRESSURE: 68 MMHG | WEIGHT: 188.6 LBS | OXYGEN SATURATION: 95 % | HEIGHT: 62 IN | HEART RATE: 83 BPM | BODY MASS INDEX: 34.71 KG/M2 | SYSTOLIC BLOOD PRESSURE: 120 MMHG

## 2020-01-08 DIAGNOSIS — E28.2 PCOS (POLYCYSTIC OVARIAN SYNDROME): ICD-10-CM

## 2020-01-08 DIAGNOSIS — G47.31 CENTRAL SLEEP APNEA: ICD-10-CM

## 2020-01-08 DIAGNOSIS — E66.9 OBESITY (BMI 30.0-34.9): ICD-10-CM

## 2020-01-08 DIAGNOSIS — F41.8 DEPRESSION WITH ANXIETY: ICD-10-CM

## 2020-01-08 PROCEDURE — 99214 OFFICE O/P EST MOD 30 MIN: CPT | Performed by: INTERNAL MEDICINE

## 2020-01-08 ASSESSMENT — PATIENT HEALTH QUESTIONNAIRE - PHQ9: CLINICAL INTERPRETATION OF PHQ2 SCORE: 0

## 2020-01-08 NOTE — PROGRESS NOTES
"Bariatric Medicine Follow Up  Chief Complaint   Patient presents with   • Weight Gain       History of Present Illness:   Lily Vasquez is a 39 y.o. female who presents for weight management follow-up and to help address co-morbidities caused by overweight, as below.    During the patient's last visit, the following were discussed and recommended:  Weight Goal: 3-5% wt loss each month (pt goal weight is 135 lb)  Diet: Continues consistent PP in am  Frozen meal for lunch instead of junk food, focus on meal prep  Less fast food as she is now doing, with 's suppport  Her goal is NO SODA next month  Physical Activity:  Wants treadmill at home for [place]  New Rx: None  Pt declines Wellbutrin, thinks she will forget to take  Side Effects: consent in chart  Behavior change: Continue with Behavioral Health    Pt reduced snacking and soda intake by 50%.  GERD much worse after soda.  Having PP in am and frozen meal for lunch, eating out less.  She is working hard to get her  to stop eating out at lunch, as she eats lunch with him.  She is trying to save money for treadmill.   is more supportive.    PCOS:  No longer taking metformin  JAN:  Sleep stable  Depression/anxiety:  Seeing behavioral health regularly    Exercise:   Did not get treadmill for [employer]     Review of Systems   Still some depressive symptoms but overall improving.  Fewer cravings.  Denies weakness, lightheadedness.  Having heavy menses, hysterectomy pending this year.  All other ROS were reviewed and are otherwise unchanged from my previous visit with patient.    Physical Exam:    /68 (BP Location: Left arm, Patient Position: Sitting, BP Cuff Size: Adult)   Pulse 83   Ht 1.575 m (5' 2\")   Wt 85.5 kg (188 lb 9.6 oz)   SpO2 95%   BMI 34.50 kg/m²   Waist Measurement   Waist: 38.75 inch/inches  Weight change since last visit: +3 lb (0 lb total)  Waist Circum change since last visit: -1.25 in (-3 in total)    Constitutional: " Oriented to person, place, and time and well-developed, well-nourished, and in no distress.    Head: Normocephalic.   Musculoskeletal: Normal range of motion. No edema.   Neurological: Alert and oriented to person, place, and time. No muscle weakness.  Gait normal.   Skin: Warm and dry. Not diaphoretic.   Psychiatric: Mood, memory, affect and judgment normal.     Laboratory:   Recent labs reviewed.      Dietitian Assessment: n/a    ASSESSMENT/PLAN:  Body mass index is 34.5 kg/m².    Obesity Stage ([place]):  2; Class 1    1. Obesity (BMI 30.0-34.9)     2. PCOS (polycystic ovarian syndrome)     3. Central sleep apnea     4. Depression with anxiety       The patient has begun to make some positive changes this year, less processed food and eating out, trying to get more exercise.  Patient has discontinued metformin for PCOS.  Sleep stable, depression/anxiety being evaluated/treated by behavioral health.  Patient declines antidepressant or anti-obesity medication.    The patient and I have discussed at length and agree to the following recommendations, which are all addressing the above diagnoses:    Weight Goal: 3-5% wt loss each month (pt goal weight is 135 lb)  Diet: Continue eating lunch with spouse (bring food!)  Physical Activity:  Get treadmill  Rx: Defers  Behavior change: continue with Behavioral Health  Follow-up: 1 mo    Patient's body mass index is 34.5 kg/m². Exercise and nutrition counseling were performed at this visit.

## 2020-01-14 ENCOUNTER — OFFICE VISIT (OUTPATIENT)
Dept: BEHAVIORAL HEALTH | Facility: CLINIC | Age: 40
End: 2020-01-14
Payer: COMMERCIAL

## 2020-01-14 DIAGNOSIS — F41.8 DEPRESSION WITH ANXIETY: ICD-10-CM

## 2020-01-14 DIAGNOSIS — Z63.4 BEREAVEMENT: ICD-10-CM

## 2020-01-14 PROCEDURE — 90834 PSYTX W PT 45 MINUTES: CPT | Performed by: MARRIAGE & FAMILY THERAPIST

## 2020-01-14 SDOH — SOCIAL STABILITY - SOCIAL INSECURITY: DISSAPEARANCE AND DEATH OF FAMILY MEMBER: Z63.4

## 2020-01-14 NOTE — BH THERAPY
" [facility]  Therapy Progress Note    Patient Name: Lily Vasquez  Patient MRN: [id number]  Today's Date: 2020     Type of session:Individual psychotherapy  Length of session: 45 minutes  Persons in attendance:Patient    Subjective/New Info: pt had a \"bad\" dream about her  mother    Objective/Observations:   Participation: Active verbal participation   Grooming: Casual   Cognition: Alert   Eye contact: Good   Mood: Depressed and Anxious   Affect: Full range   Thought process: Goal-directed   Speech: Rate within normal limits and Volume within normal limits   Other:     Diagnoses: No diagnosis found.     Current risk:   SUICIDE: Not applicable   Homicide: Not applicable   Self-harm: Not applicable   Relapse: Not applicable   Other:    Safety Plan reviewed? Not Indicated   If evidence of imminent risk is present, intervention/plan:     Therapeutic Intervention(s): Clarify:  Clarify feelings and Clarify thoughts, Cognitive modification, Positive behavior reinforced, Self-care skills, Stressors assessed and Supportive psychotherapy    Treatment Goal(s)/Objective(s) addressed: id pt stressors including having a dream about her beloved mother, clarified pt is grieving and sad about her mom's death, using cognitive mod helped pt apply the grieving process, reinforced positive application of the grieving process to her process, encouraged cont self care and provided support for pt    Progress toward Treatment Goals: Mild improvement    Plan:  - \"Homework\" recommendation: pt will apply the thrid stage of grieving to her emotional process every day during the 2 weeks between session    ESTRELLA Chan  2020                                     "

## 2020-01-30 ENCOUNTER — OFFICE VISIT (OUTPATIENT)
Dept: URGENT CARE | Facility: PHYSICIAN GROUP | Age: 40
End: 2020-01-30
Payer: COMMERCIAL

## 2020-01-30 VITALS
RESPIRATION RATE: 20 BRPM | SYSTOLIC BLOOD PRESSURE: 110 MMHG | TEMPERATURE: 99.2 F | OXYGEN SATURATION: 97 % | WEIGHT: 190 LBS | HEART RATE: 96 BPM | HEIGHT: 62 IN | DIASTOLIC BLOOD PRESSURE: 68 MMHG | BODY MASS INDEX: 34.96 KG/M2

## 2020-01-30 DIAGNOSIS — J01.90 ACUTE NON-RECURRENT SINUSITIS, UNSPECIFIED LOCATION: ICD-10-CM

## 2020-01-30 PROCEDURE — 99214 OFFICE O/P EST MOD 30 MIN: CPT | Performed by: NURSE PRACTITIONER

## 2020-01-30 RX ORDER — AMOXICILLIN AND CLAVULANATE POTASSIUM 875; 125 MG/1; MG/1
1 TABLET, FILM COATED ORAL 2 TIMES DAILY
Qty: 14 TAB | Refills: 0 | Status: SHIPPED | OUTPATIENT
Start: 2020-01-30 | End: 2020-02-06

## 2020-01-30 ASSESSMENT — ENCOUNTER SYMPTOMS
CARDIOVASCULAR NEGATIVE: 1
SHORTNESS OF BREATH: 0
MYALGIAS: 1
COUGH: 1
SORE THROAT: 1
FEVER: 0

## 2020-01-30 NOTE — LETTER
January 30, 2020         Patient: Lily Vasquez   YOB: 1980   Date of Visit: 1/30/2020           To Whom it May Concern:    Lily Vasquez was seen in my clinic on 1/30/2020.  Patient has missed work the past 2 days due to illness.  The patient may return to work 2/3/2020 or sooner if symptoms are improved and the patient is feeling better.     If you have any questions or concerns, please don't hesitate to call.        Sincerely,           KB Goodrich.  Electronically Signed

## 2020-01-31 NOTE — PROGRESS NOTES
Subjective:     Lily Vasquez is a 39 y.o. female who presents for Cough (Sore throat, bilateral ear pain, body aches, x2 weeks )       Cough   This is a new problem. The problem has been gradually worsening. Associated symptoms include ear pain, myalgias and a sore throat. Pertinent negatives include no fever or shortness of breath. Nothing aggravates the symptoms.      Patient reports about 2 weeks ago she started to develop runny nose, nasal congestion, sore throat, and a cough.  Reports that symptoms are now worsening and patient is experiencing bilateral ear pain, body aches, and sinus tenderness.  Prior therapy: Ibuprofen with minimal improvement of symptoms.  Reports that her children have also been ill with a variety of illnesses including croup, sinus infection, and an ear infection.    PMH:  has a past medical history of Anesthesia, Arthritis, Bronchitis, Chickenpox, Dental disorder, Eating disorder (7/10/2018), Fatty liver, GERD (gastroesophageal reflux disease), Gestational diabetes, Hemorrhoids, Hiatus hernia syndrome, Indigestion, Influenza, Neck pain, Obesity, and Vitamin D deficiency. She also has no past medical history of CAD (coronary artery disease), COPD, Liver disease, or Seizure disorder (HCC).    MEDS:   Current Outpatient Medications:   •  amoxicillin-clavulanate (AUGMENTIN) 875-125 MG Tab, Take 1 Tab by mouth 2 times a day for 7 days., Disp: 14 Tab, Rfl: 0  •  calcium carbonate (TUMS) 500 MG Chew Tab, Take 500 mg by mouth every day., Disp: , Rfl:   •  ibuprofen (MOTRIN) 200 MG Tab, Take 200 mg by mouth every 6 hours as needed., Disp: , Rfl:     ALLERGIES:   Allergies   Allergen Reactions   • Codeine Vomiting     abd pain   • Morphine Vomiting   • Sulfa Drugs Vomiting     abd pain   • Tape Contact Dermatitis     SURGHX:   Past Surgical History:   Procedure Laterality Date   • LISBET BY LAPAROSCOPY  12/18/2012    Performed by Idania Camara M.D. at [facility] ORS   • LARYNGOSCOPY   "7/6/2011    Performed by EDNA MINA at SURGERY SAME DAY [facility] ORS   • ESOPHAGOSCOPY  7/6/2011    Performed by EDNA MINA at SURGERY SAME DAY [facility] ORS   • TONSILLECTOMY AND ADENOIDECTOMY  1991   • OTHER  1991    TONSILLECTOMY    • CHOLECYSTECTOMY       SOCHX:  reports that she has never smoked. She has never used smokeless tobacco. She reports that she does not drink alcohol or use drugs.     FH: Reviewed with patient, not pertinent to this visit.    Review of Systems   Constitutional: Positive for malaise/fatigue. Negative for fever.   HENT: Positive for ear pain and sore throat.    Respiratory: Positive for cough. Negative for shortness of breath.    Cardiovascular: Negative.    Musculoskeletal: Positive for myalgias.   All other systems reviewed and are negative.    Additional details per HPI.    Objective:     /68 (BP Location: Right arm, Patient Position: Sitting, BP Cuff Size: Adult)   Pulse 96   Temp 37.3 °C (99.2 °F) (Temporal)   Resp 20   Ht 1.575 m (5' 2\")   Wt 86.2 kg (190 lb)   SpO2 97%   BMI 34.75 kg/m²     Physical Exam  Vitals signs reviewed.   Constitutional:       General: She is not in acute distress.     Appearance: She is well-developed. She is not toxic-appearing or diaphoretic.   HENT:      Head: Normocephalic.      Right Ear: External ear normal. Tympanic membrane is not perforated, erythematous or bulging.      Left Ear: External ear normal. Tympanic membrane is not perforated, erythematous or bulging.      Ears:      Comments: Dull TMs bilaterally     Nose:      Right Sinus: Maxillary sinus tenderness present.      Left Sinus: Maxillary sinus tenderness present.      Mouth/Throat:      Mouth: Mucous membranes are moist.      Pharynx: Oropharynx is clear. Uvula midline.   Eyes:      Extraocular Movements: Extraocular movements intact.      Conjunctiva/sclera: Conjunctivae normal.      Pupils: Pupils are equal, round, and reactive to light.   Neck:      " Musculoskeletal: Normal range of motion.   Cardiovascular:      Rate and Rhythm: Normal rate and regular rhythm.      Pulses: Normal pulses.      Heart sounds: Normal heart sounds.   Pulmonary:      Effort: Pulmonary effort is normal. No respiratory distress.      Breath sounds: Normal breath sounds. No decreased breath sounds, wheezing, rhonchi or rales.   Abdominal:      General: Bowel sounds are normal.      Palpations: Abdomen is soft.      Tenderness: There is no tenderness.   Musculoskeletal: Normal range of motion.         General: No deformity.   Skin:     General: Skin is warm and dry.      Capillary Refill: Capillary refill takes less than 2 seconds.      Coloration: Skin is not pale.   Neurological:      Mental Status: She is alert and oriented to person, place, and time.      Sensory: No sensory deficit.      Coordination: Coordination normal.   Psychiatric:         Behavior: Behavior normal. Behavior is cooperative.          Assessment/Plan:     1. Acute non-recurrent sinusitis, unspecified location  - amoxicillin-clavulanate (AUGMENTIN) 875-125 MG Tab; Take 1 Tab by mouth 2 times a day for 7 days.  Dispense: 14 Tab; Refill: 0    Various differentials discussed including allergic vs viral vs bacterial etiologies.    Rx as above sent electronically.    Discussed close monitoring and supportive measures including increasing fluids and rest as well as OTC symptom management per 's instructions. Discussed OTC decongestants (e.g. Sudafed), antihistamines, Flonase, and nasal saline rinses/neti pot.    Work note provided. Discharge summary provided.     Vital signs stable, afebrile, asymptomatic, no acute distress.    Warning signs reviewed. Return precautions discussed.    Patient advised to: Return for 1) Symptoms don't improve or worsen, or go to ER, 2) Follow up with primary care in 7-10 days.    Differential diagnosis, natural history, supportive care, and indications for immediate follow-up  discussed. All questions answered. Patient agrees with the plan of care.

## 2020-01-31 NOTE — PATIENT INSTRUCTIONS
You may use any combination of the following over-the-counter medications per 's instructions:  - nasal rinses/saline sprays/neti pot  - nasal steroid sprays (e.g. Flonase, Nasacort)  - oral antihistamine (e.g. Claritin, Zyrtec)  - oral mucolytic (e.g. Mucinex)  - oral decongestant (e.g. Sudafed)  - oral pain reliever (e.g. Tylenol)  - oral anti-inflammatory/pain reliever (e.g. Motrin, Advil)

## 2020-02-06 ENCOUNTER — OFFICE VISIT (OUTPATIENT)
Dept: URGENT CARE | Facility: PHYSICIAN GROUP | Age: 40
End: 2020-02-06
Payer: COMMERCIAL

## 2020-02-06 VITALS
TEMPERATURE: 98.9 F | HEIGHT: 62 IN | HEART RATE: 85 BPM | RESPIRATION RATE: 16 BRPM | DIASTOLIC BLOOD PRESSURE: 80 MMHG | SYSTOLIC BLOOD PRESSURE: 116 MMHG | OXYGEN SATURATION: 98 % | BODY MASS INDEX: 34.6 KG/M2 | WEIGHT: 188 LBS

## 2020-02-06 DIAGNOSIS — J01.90 ACUTE NON-RECURRENT SINUSITIS, UNSPECIFIED LOCATION: Primary | ICD-10-CM

## 2020-02-06 DIAGNOSIS — J20.9 ACUTE BRONCHITIS WITH BRONCHOSPASM: ICD-10-CM

## 2020-02-06 DIAGNOSIS — R05.9 COUGH: ICD-10-CM

## 2020-02-06 LAB
INT CON NEG: NORMAL
INT CON POS: NORMAL
S PYO AG THROAT QL: NEGATIVE

## 2020-02-06 PROCEDURE — 99214 OFFICE O/P EST MOD 30 MIN: CPT | Performed by: PHYSICIAN ASSISTANT

## 2020-02-06 PROCEDURE — 87880 STREP A ASSAY W/OPTIC: CPT | Performed by: PHYSICIAN ASSISTANT

## 2020-02-06 RX ORDER — ALBUTEROL SULFATE 90 UG/1
2 AEROSOL, METERED RESPIRATORY (INHALATION) EVERY 4 HOURS PRN
Qty: 1 INHALER | Refills: 0 | Status: SHIPPED | OUTPATIENT
Start: 2020-02-06 | End: 2021-07-30

## 2020-02-06 RX ORDER — PREDNISONE 20 MG/1
20 TABLET ORAL DAILY
Qty: 5 TAB | Refills: 0 | Status: SHIPPED | OUTPATIENT
Start: 2020-02-06 | End: 2020-02-11

## 2020-02-06 RX ORDER — DEXTROMETHORPHAN HYDROBROMIDE AND PROMETHAZINE HYDROCHLORIDE 15; 6.25 MG/5ML; MG/5ML
5 SYRUP ORAL EVERY 4 HOURS PRN
Qty: 120 ML | Refills: 0 | Status: SHIPPED | OUTPATIENT
Start: 2020-02-06 | End: 2020-02-16

## 2020-02-06 RX ORDER — DOXYCYCLINE HYCLATE 100 MG
100 TABLET ORAL 2 TIMES DAILY
Qty: 20 TAB | Refills: 0 | Status: SHIPPED | OUTPATIENT
Start: 2020-02-06 | End: 2020-02-16

## 2020-02-06 NOTE — LETTER
February 6, 2020         Patient: Lily Vasquez   YOB: 1980   Date of Visit: 2/6/2020           To Whom it May Concern:    Lily Vasquez was seen in my clinic on 2/6/2020. She may return to work on 2/10/2020.    If you have any questions or concerns, please don't hesitate to call.        Sincerely,           Nel Elizondo P.A.-C.  Electronically Signed

## 2020-02-07 NOTE — PROGRESS NOTES
Subjective:      Lily Vasquez is a 39 y.o. female who presents with Sore Throat (ear pain, cough, nasel congestion,x3 weeks, came in  last week with sinus infection)    PMH:  has a past medical history of Anesthesia, Arthritis, Bronchitis, Chickenpox, Dental disorder, Eating disorder (7/10/2018), Fatty liver, GERD (gastroesophageal reflux disease), Gestational diabetes, Hemorrhoids, Hiatus hernia syndrome, Indigestion, Influenza, Neck pain, Obesity, and Vitamin D deficiency. She also has no past medical history of CAD (coronary artery disease), COPD, Liver disease, or Seizure disorder (HCC).  MEDS:   Current Outpatient Medications:   •  Pseudoephedrine HCl (SUDAFED PO), Take  by mouth., Disp: , Rfl:   •  promethazine-dextromethorphan (PROMETHAZINE-DM) 6.25-15 MG/5ML syrup, Take 5 mL by mouth every four hours as needed for Cough for up to 10 days., Disp: 120 mL, Rfl: 0  •  doxycycline (VIBRAMYCIN) 100 MG Tab, Take 1 Tab by mouth 2 times a day for 10 days., Disp: 20 Tab, Rfl: 0  •  albuterol 108 (90 Base) MCG/ACT Aero Soln inhalation aerosol, Inhale 2 Puffs by mouth every four hours as needed for Shortness of Breath., Disp: 1 Inhaler, Rfl: 0  •  calcium carbonate (TUMS) 500 MG Chew Tab, Take 500 mg by mouth every day., Disp: , Rfl:   •  ibuprofen (MOTRIN) 200 MG Tab, Take 200 mg by mouth every 6 hours as needed., Disp: , Rfl:   ALLERGIES:   Allergies   Allergen Reactions   • Codeine Vomiting     abd pain   • Morphine Vomiting   • Sulfa Drugs Vomiting     abd pain   • Tape Contact Dermatitis     SURGHX:   Past Surgical History:   Procedure Laterality Date   • LISBET BY LAPAROSCOPY  12/18/2012    Performed by Idania Camara M.D. at SURGERY [facility]S   • LARYNGOSCOPY  7/6/2011    Performed by EDNA MINA at SURGERY SAME DAY [facility] ORS   • ESOPHAGOSCOPY  7/6/2011    Performed by EDNA MINA at SURGERY SAME DAY [facility] ORS   • TONSILLECTOMY AND ADENOIDECTOMY  1991   • OTHER  1991    TONSILLECTOMY   "  • CHOLECYSTECTOMY       SOCHX:  reports that she has never smoked. She has never used smokeless tobacco. She reports that she does not drink alcohol or use drugs.  FH: Reviewed with patient, not pertinent to this visit.             Patient presents with:  Sore Throat: ear pain, cough, nasel congestion,x3 weeks, came in UC last week with sinus infection, not really improved.        Sinus Problem   This is a new problem. The current episode started 1 to 4 weeks ago. The problem has been gradually worsening since onset. There has been no fever. Her pain is at a severity of 8/10. Associated symptoms include congestion, coughing, ear pain, headaches, sinus pressure and a sore throat. Pertinent negatives include no chills, shortness of breath or swollen glands. Past treatments include oral decongestants, saline sprays, acetaminophen and antibiotics. The treatment provided mild relief.       Review of Systems   Constitutional: Negative for chills and fever.   HENT: Positive for congestion, ear pain, sinus pressure and sore throat.    Eyes: Negative for discharge.   Respiratory: Positive for cough and sputum production. Negative for shortness of breath.    Cardiovascular: Negative for chest pain.   Gastrointestinal: Negative for diarrhea, nausea and vomiting.   Musculoskeletal: Negative.    Skin: Negative for rash.   Neurological: Positive for headaches. Negative for dizziness.   All other systems reviewed and are negative.         Objective:     /80   Pulse 85   Temp 37.2 °C (98.9 °F) (Temporal)   Resp 16   Ht 1.575 m (5' 2\")   Wt 85.3 kg (188 lb)   LMP 01/06/2020 (Approximate)   SpO2 98%   BMI 34.39 kg/m²      Physical Exam  Vitals signs and nursing note reviewed.   Constitutional:       General: She is not in acute distress.     Appearance: Normal appearance. She is well-developed. She is obese. She is not toxic-appearing or diaphoretic.   HENT:      Head: Normocephalic and atraumatic.      Right Ear: " Tympanic membrane normal.      Left Ear: Tympanic membrane normal.      Nose: Mucosal edema, congestion and rhinorrhea present.      Right Sinus: Maxillary sinus tenderness present.      Left Sinus: Maxillary sinus tenderness present.      Mouth/Throat:      Lips: Pink.      Mouth: Mucous membranes are moist.      Pharynx: Uvula midline. Posterior oropharyngeal erythema present. No oropharyngeal exudate.   Eyes:      Extraocular Movements: Extraocular movements intact.      Conjunctiva/sclera: Conjunctivae normal.      Pupils: Pupils are equal, round, and reactive to light.   Neck:      Musculoskeletal: Normal range of motion and neck supple.   Cardiovascular:      Rate and Rhythm: Normal rate and regular rhythm.      Pulses: Normal pulses.      Heart sounds: Normal heart sounds.   Pulmonary:      Effort: Pulmonary effort is normal. No respiratory distress.      Breath sounds: Normal breath sounds. No rhonchi.   Abdominal:      Palpations: Abdomen is soft.   Musculoskeletal: Normal range of motion.   Lymphadenopathy:      Cervical: No cervical adenopathy.   Skin:     General: Skin is warm and dry.      Capillary Refill: Capillary refill takes less than 2 seconds.   Neurological:      General: No focal deficit present.      Mental Status: She is alert and oriented to person, place, and time.      Gait: Gait normal.   Psychiatric:         Mood and Affect: Mood normal.         Behavior: Behavior is cooperative.                 Assessment/Plan:     1. Acute non-recurrent sinusitis, unspecified location  promethazine-dextromethorphan (PROMETHAZINE-DM) 6.25-15 MG/5ML syrup    doxycycline (VIBRAMYCIN) 100 MG Tab    predniSONE (DELTASONE) 20 MG Tab    albuterol 108 (90 Base) MCG/ACT Aero Soln inhalation aerosol    POCT Rapid Strep A   2. Cough  promethazine-dextromethorphan (PROMETHAZINE-DM) 6.25-15 MG/5ML syrup    doxycycline (VIBRAMYCIN) 100 MG Tab    predniSONE (DELTASONE) 20 MG Tab    albuterol 108 (90 Base) MCG/ACT  Aero Soln inhalation aerosol   3. Acute bronchitis with bronchospasm  promethazine-dextromethorphan (PROMETHAZINE-DM) 6.25-15 MG/5ML syrup    doxycycline (VIBRAMYCIN) 100 MG Tab    predniSONE (DELTASONE) 20 MG Tab    albuterol 108 (90 Base) MCG/ACT Aero Soln inhalation aerosol     PT can continue OTC medications, increase fluids and rest until symptoms improve.     PT instructed not to drive or operate heavy machinery or drink alcohol while taking this medication because it contains either a narcotic or benzodiazepines which causes drowsiness. PT verbalized understanding of these instructions.     [facility] Aware web site evaluation: I have obtained and reviewed patient utilization report from [facility] pharmacy database prior to writing prescription for controlled substance.  No history of abuse.    PT should follow up with PCP in 1-2 days for re-evaluation if symptoms have not improved.  Discussed red flags and reasons to return to UC or ED.  Pt and/or family verbalized understanding of diagnosis and follow up instructions and was offered informational handout on diagnosis.  PT discharged.

## 2020-02-12 ENCOUNTER — OFFICE VISIT (OUTPATIENT)
Dept: BEHAVIORAL HEALTH | Facility: CLINIC | Age: 40
End: 2020-02-12
Payer: COMMERCIAL

## 2020-02-12 DIAGNOSIS — Z63.4 BEREAVEMENT: ICD-10-CM

## 2020-02-12 DIAGNOSIS — F41.8 DEPRESSION WITH ANXIETY: ICD-10-CM

## 2020-02-12 DIAGNOSIS — Z56.6 WORK STRESS: ICD-10-CM

## 2020-02-12 PROCEDURE — 90834 PSYTX W PT 45 MINUTES: CPT | Performed by: MARRIAGE & FAMILY THERAPIST

## 2020-02-12 SDOH — HEALTH STABILITY - MENTAL HEALTH: OTHER PHYSICAL AND MENTAL STRAIN RELATED TO WORK: Z56.6

## 2020-02-12 SDOH — SOCIAL STABILITY - SOCIAL INSECURITY: DISSAPEARANCE AND DEATH OF FAMILY MEMBER: Z63.4

## 2020-02-12 NOTE — BH THERAPY
[facility]  Therapy Progress Note    Patient Name: Lliy Vasquez  Patient MRN: [id number]  Today's Date: 2/12/2020     Type of session:Individual psychotherapy  Length of session: 45 minutes  Persons in attendance:Patient    Subjective/New Info: pt was triggered by her niece's engagement announcement    Objective/Observations:   Participation: Active verbal participation   Grooming: Casual   Cognition: Alert   Eye contact: Good   Mood: Depressed   Affect: Sad and Tearful   Thought process: Goal-directed   Speech: Rate within normal limits and Volume within normal limits   Other:     Diagnoses: No diagnosis found.     Current risk:   SUICIDE: Not applicable   Homicide: Not applicable   Self-harm: Not applicable   Relapse: Not applicable   Other:    Safety Plan reviewed? Not Indicated   If evidence of imminent risk is present, intervention/plan:     Therapeutic Intervention(s): Clarify:  Clarify feelings and Clarify thoughts, Cognitive modification, Positive behavior reinforced, Self-care skills, Stressors assessed and Supportive psychotherapy    Treatment Goal(s)/Objective(s) addressed: id pt stressors including niece announcing she is getting  and pt was triggered b/c her mom would have loved to see her (niece) get , clarified pt is grieving the loss of her mother, using  Cognitive mod helped pt explore ways she could bring her mother's spirit into the wedding ceremony, reinforced pt's positive creative thinking and behaving, encouraged cont self care and provided support for pt     Progress toward Treatment Goals: Mild improvement    Plan:return for 1:1  - Next appointment scheduled:  3/4/2020    ESTRELLA Chan  2/12/2020

## 2020-02-14 ASSESSMENT — ENCOUNTER SYMPTOMS
SPUTUM PRODUCTION: 1
EYE DISCHARGE: 0
HEADACHES: 1
DIZZINESS: 0
FEVER: 0
SHORTNESS OF BREATH: 0
COUGH: 1
VOMITING: 0
MUSCULOSKELETAL NEGATIVE: 1
SORE THROAT: 1
CHILLS: 0
SWOLLEN GLANDS: 0
SINUS PRESSURE: 1
NAUSEA: 0
DIARRHEA: 0

## 2020-02-19 ENCOUNTER — OFFICE VISIT (OUTPATIENT)
Dept: HEALTH INFORMATION MANAGEMENT | Facility: MEDICAL CENTER | Age: 40
End: 2020-02-19
Payer: COMMERCIAL

## 2020-02-19 VITALS
HEIGHT: 62 IN | OXYGEN SATURATION: 95 % | BODY MASS INDEX: 34.08 KG/M2 | DIASTOLIC BLOOD PRESSURE: 70 MMHG | WEIGHT: 185.2 LBS | SYSTOLIC BLOOD PRESSURE: 118 MMHG | HEART RATE: 83 BPM

## 2020-02-19 DIAGNOSIS — K21.9 GASTROESOPHAGEAL REFLUX DISEASE WITHOUT ESOPHAGITIS: ICD-10-CM

## 2020-02-19 DIAGNOSIS — G47.31 CENTRAL SLEEP APNEA: ICD-10-CM

## 2020-02-19 DIAGNOSIS — F51.04 CHRONIC INSOMNIA: ICD-10-CM

## 2020-02-19 DIAGNOSIS — E66.9 OBESITY (BMI 30.0-34.9): ICD-10-CM

## 2020-02-19 DIAGNOSIS — E55.9 VITAMIN D INSUFFICIENCY: ICD-10-CM

## 2020-02-19 PROCEDURE — 99214 OFFICE O/P EST MOD 30 MIN: CPT | Performed by: INTERNAL MEDICINE

## 2020-02-19 ASSESSMENT — PATIENT HEALTH QUESTIONNAIRE - PHQ9: CLINICAL INTERPRETATION OF PHQ2 SCORE: 0

## 2020-02-19 NOTE — PROGRESS NOTES
"Bariatric Medicine Follow Up  Chief Complaint   Patient presents with   • Weight Gain       History of Present Illness:   Lily Vasquez is a 39 y.o. female who presents for weight management follow-up and to help address co-morbidities caused by overweight, as below.    During the patient's last visit, the following were discussed and recommended:  Weight Goal: 3-5% wt loss each month (pt goal weight is 135 lb)  Diet: Continue eating lunch with spouse (bring food!)  Physical Activity:  Get treadmill  Rx: Defers  Behavior change: continue with Behavioral Health    She feels better now that she has begun again to make some positive lifestyle change, focus more on her health.    Much more aware of food choices.  She may look in a shop at some snack foods that she used to eat but is not buying them.  Bringing her own lunch.  She is hoping her  will begin to do the same.  No soda in 4 days!      She tried tracking intake but not consistent.  She admits she does not enter evening meals into my fitness pal because she has already reached her maximum by lunch.  She likes the accountability when she is consistent with it and wants to try harder to use my fitness pal more.    PCOS: Not on metformin, defers any medication  JAN: Sleep stable  Depression: Continues monthly visits with behavioral health, finds the visits very useful    Exercise:   Ordered treadmill for home     Review of Systems   Denies worsening anxiety, mood change, fatigue, lightheaded or weakness.  All other ROS were reviewed and are otherwise unchanged from my previous visit with patient.    Physical Exam:    /70 (BP Location: Left arm, Patient Position: Sitting, BP Cuff Size: Large adult)   Pulse 83   Ht 1.575 m (5' 2\")   Wt 84 kg (185 lb 3.2 oz)   SpO2 95%   BMI 33.87 kg/m²   Waist Measurement   Waist: 38.5 inch/inches  Weight change since last visit:  -3.4 lb (-3.5 lb total)  Waist Circum change since last visit:  -0.25 in (-3 in " total)    Constitutional: Oriented to person, place, and time and well-developed, well-nourished, and in no distress.    Head: Normocephalic.   Musculoskeletal: Normal range of motion. No edema.   Neurological: Alert and oriented to person, place, and time. No muscle weakness.  Gait normal.   Skin: Warm and dry. Not diaphoretic.   Psychiatric: Mood, memory, affect and judgment normal.     Laboratory:   Recent labs reviewed.      Dietitian Assessment: n/a  ASSESSMENT/PLAN:  Body mass index is 33.87 kg/m².    Obesity Stage ([place]):  2; Class 1    1. Obesity (BMI 30.0-34.9)     2. Central sleep apnea     3. Chronic insomnia     4. Vitamin D insufficiency  VITAMIN D 25-HYDROXY   5. Gastroesophageal reflux disease without esophagitis       The patient has begun to make some positive lifestyle change again, appears to be more motivated and focused on her own health.  Her depression also appears to be better controlled, seeing behavioral health regularly.  Encouraged patient to resume tracking her intake, watching refined CHO intake and total kcals.  She is really looking forward to increasing her activity level.  Continue to monitor sleep, GERD, currently controlled.  Update vitamin D levels.  Low vitamin D levels can negatively affect weight and mood.    The patient and I have discussed at length and agree to the following recommendations, which are all addressing the above diagnoses:    Weight Goal: 3-5% wt loss each month (pt goal weight is 135 lb)  Diet: Continue mindfulness around food choices  Tracking more consistently  Continue to bring her own lunch, to eat with   No soda  Physical Activity:  To start treadmill 30 min daily once it arrives  LT goal is to start running  Rx: Declines  Behavior change: Continue monthly behavioral health therapy  Follow-up: 6 wks    Patient's body mass index is 33.87 kg/m². Exercise and nutrition counseling were performed at this visit.

## 2020-03-03 ENCOUNTER — HOSPITAL ENCOUNTER (OUTPATIENT)
Dept: LAB | Facility: MEDICAL CENTER | Age: 40
End: 2020-03-03
Attending: INTERNAL MEDICINE
Payer: COMMERCIAL

## 2020-03-03 PROCEDURE — 82306 VITAMIN D 25 HYDROXY: CPT

## 2020-03-03 PROCEDURE — 36415 COLL VENOUS BLD VENIPUNCTURE: CPT

## 2020-03-04 ENCOUNTER — OFFICE VISIT (OUTPATIENT)
Dept: BEHAVIORAL HEALTH | Facility: CLINIC | Age: 40
End: 2020-03-04
Payer: COMMERCIAL

## 2020-03-04 DIAGNOSIS — Z63.0 PARTNER RELATIONSHIP PROBLEM: ICD-10-CM

## 2020-03-04 DIAGNOSIS — F41.8 DEPRESSION WITH ANXIETY: ICD-10-CM

## 2020-03-04 LAB — 25(OH)D3 SERPL-MCNC: 11 NG/ML (ref 30–100)

## 2020-03-04 PROCEDURE — 90834 PSYTX W PT 45 MINUTES: CPT | Performed by: MARRIAGE & FAMILY THERAPIST

## 2020-03-04 SDOH — SOCIAL STABILITY - SOCIAL INSECURITY: PROBLEMS IN RELATIONSHIP WITH SPOUSE OR PARTNER: Z63.0

## 2020-03-04 NOTE — BH THERAPY
" [facility]  Therapy Progress Note    Patient Name: Lily Vasquez  Patient MRN: [id number]  Today's Date: 3/4/2020     Type of session:Individual psychotherapy  Length of session: 45 minutes  Persons in attendance:Patient    Subjective/New Info: pt is depressed and worried about her 3 children and H is not doing his part    Objective/Observations:   Participation: Active verbal participation   Grooming: Casual   Cognition: Alert   Eye contact: Good   Mood: Depressed, Anxious and Angry   Affect: Sad, Anxious and Tearful   Thought process: Goal-directed   Speech: Rate within normal limits and Volume within normal limits   Other:     Diagnoses: No diagnosis found.     Current risk:   SUICIDE: Not applicable   Homicide: Not applicable   Self-harm: Not applicable   Relapse: Not applicable   Other:    Safety Plan reviewed? No   If evidence of imminent risk is present, intervention/plan:     Therapeutic Intervention(s): Clarify:  Clarify feelings and Clarify thoughts, Cognitive modification, Positive behavior reinforced, Self-care skills, Stressors assessed and Supportive psychotherapy    Treatment Goal(s)/Objective(s) addressed: id pt stressors including  and absent H, clarified pt is angry with her H and worried about her kids depressed about her lack of self care, using cognitive mod helped pt id plan for self care, reinforced pt's positive work out on tread mill plan, encouraged pt to follow through will self care plan, provided support for pt     Progress toward Treatment Goals: Mild improvement    Plan:  - \"Homework\" recommendation: work out for 15 minutes on tread mill and put kids in the garage with her H for 15 min's do not take no for an answer    ESTRELLA Chan  3/4/2020                                     "

## 2020-03-12 RX ORDER — MULTIVIT-MIN/IRON/FOLIC ACID/K 18-600-40
CAPSULE ORAL DAILY
Qty: 30 TAB | Refills: 1 | COMMUNITY
End: 2021-09-08

## 2020-04-08 ENCOUNTER — APPOINTMENT (OUTPATIENT)
Dept: BEHAVIORAL HEALTH | Facility: CLINIC | Age: 40
End: 2020-04-08
Payer: COMMERCIAL

## 2020-04-27 ENCOUNTER — APPOINTMENT (OUTPATIENT)
Dept: HEALTH INFORMATION MANAGEMENT | Facility: MEDICAL CENTER | Age: 40
End: 2020-04-27
Payer: COMMERCIAL

## 2020-05-07 ENCOUNTER — TELEMEDICINE (OUTPATIENT)
Dept: BEHAVIORAL HEALTH | Facility: CLINIC | Age: 40
End: 2020-05-07
Payer: COMMERCIAL

## 2020-05-07 DIAGNOSIS — Z63.4 BEREAVEMENT: ICD-10-CM

## 2020-05-07 DIAGNOSIS — F41.8 DEPRESSION WITH ANXIETY: ICD-10-CM

## 2020-05-07 DIAGNOSIS — Z63.0 PARTNER RELATIONSHIP PROBLEM: ICD-10-CM

## 2020-05-07 PROCEDURE — 90834 PSYTX W PT 45 MINUTES: CPT | Mod: 95,CR | Performed by: MARRIAGE & FAMILY THERAPIST

## 2020-05-07 SDOH — SOCIAL STABILITY - SOCIAL INSECURITY: PROBLEMS IN RELATIONSHIP WITH SPOUSE OR PARTNER: Z63.0

## 2020-05-07 SDOH — SOCIAL STABILITY - SOCIAL INSECURITY: DISSAPEARANCE AND DEATH OF FAMILY MEMBER: Z63.4

## 2020-05-07 NOTE — BH THERAPY
" [facility]  Therapy Progress Note    Patient Name: Lily Vasquez  Patient MRN: [id number]  Today's Date: 5/7/2020     Type of session:Individual psychotherapy  Length of session: 45 minutes  Persons in attendance:Patient    Subjective/New Info: this confidential 45 min zoom video psychotherapy session was authorized by tracy and approved by pt    Objective/Observations:   Participation: Active verbal participation   Grooming: Casual   Cognition: Alert   Eye contact: Good   Mood: Depressed and Angry   Affect: Constricted   Thought process: Goal-directed   Speech: Rate within normal limits and Volume within normal limits   Other:     Diagnoses: No diagnosis found.     Current risk:   SUICIDE: Not applicable   Homicide: Not applicable   Self-harm: Not applicable   Relapse: Not applicable   Other:    Safety Plan reviewed? 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

## 2020-06-22 ENCOUNTER — TELEMEDICINE (OUTPATIENT)
Dept: BEHAVIORAL HEALTH | Facility: CLINIC | Age: 40
End: 2020-06-22
Payer: COMMERCIAL

## 2020-06-22 DIAGNOSIS — F41.8 DEPRESSION WITH ANXIETY: ICD-10-CM

## 2020-06-22 PROCEDURE — 90834 PSYTX W PT 45 MINUTES: CPT | Mod: 95,CR | Performed by: MARRIAGE & FAMILY THERAPIST

## 2020-06-22 NOTE — BH THERAPY
" [facility]  Therapy Progress Note    Patient Name: Lily Vasquez  Patient MRN: [id number]  Today's Date: 6/22/2020     Type of session:Individual psychotherapy  Length of session: 45 minutes  Persons in attendance:Patient    Subjective/New Info: this confidential 45 min zoom psychotherapy session was authorizd by covid and approved by pt. Pt was told her job at [employer] is permenantly at home    Objective/Observations:   Participation: Active verbal participation   Grooming: Casual   Cognition: Alert   Eye contact: Good   Mood: Depressed and Anxious   Affect: Flat   Thought process: Goal-directed   Speech: Rate within normal limits and Volume within normal limits   Other:     Diagnoses: No diagnosis found.     Current risk:   SUICIDE: Not applicable   Homicide: Not applicable   Self-harm: Not applicable   Relapse: Not applicable   Other:    Safety Plan reviewed? No   If evidence of imminent risk is present, intervention/plan:     Therapeutic Intervention(s): Clarify:  Clarify feelings and Clarify thoughts, Cognitive modification, Positive behavior reinforced, Self-care skills, Stressors assessed and Supportive psychotherapy    Treatment Goal(s)/Objective(s) addressed: id pt stressors including wotking from home and raising her children including a special  Needs son, clarified pt is happy but stressed out, using cognitive mod helped pt id what she can and cannot control, reinforced pt's willingness to let go of what she cannot control, encouraged gentle self care and provided support for pt    Progress toward Treatment Goals: Mild improvement    Plan:  - \"Homework\" recommendation: pt will ask for help when she needs it and if sh cannot get help she will do her best and then let it go as it relates to her children and their summer. pt will report back in next therapy session    ESTRELLA Chan  6/22/2020                                     "

## 2020-07-22 ENCOUNTER — TELEMEDICINE (OUTPATIENT)
Dept: BEHAVIORAL HEALTH | Facility: CLINIC | Age: 40
End: 2020-07-22
Payer: COMMERCIAL

## 2020-07-22 DIAGNOSIS — Z63.0 PARTNER RELATIONSHIP PROBLEM: ICD-10-CM

## 2020-07-22 DIAGNOSIS — F41.8 DEPRESSION WITH ANXIETY: ICD-10-CM

## 2020-07-22 PROCEDURE — 90834 PSYTX W PT 45 MINUTES: CPT | Mod: 95,CR | Performed by: MARRIAGE & FAMILY THERAPIST

## 2020-07-22 SDOH — SOCIAL STABILITY - SOCIAL INSECURITY: PROBLEMS IN RELATIONSHIP WITH SPOUSE OR PARTNER: Z63.0

## 2020-07-22 NOTE — BH THERAPY
" [facility]  Therapy Progress Note    Patient Name: Lily Vasquez  Patient MRN: [id number]  Today's Date: 7/22/2020     Type of session:Individual psychotherapy  Length of session: 45 minutes  Persons in attendance:Patient    Subjective/New Info: this confidential 45 min zoom psychotherapy session was authorized by covid and approved by pt. Pt is struggling with stress eating and sheltering in place working from home and balancing childcare and soon school    Objective/Observations:   Participation: Active verbal participation   Grooming: Casual   Cognition: Alert   Eye contact: Good   Mood: Depressed and Anxious   Affect: Flat   Thought process: Goal-directed   Speech: Rate within normal limits and Volume within normal limits   Other:     Diagnoses: No diagnosis found.     Current risk:   SUICIDE: Not applicable   Homicide: Not applicable   Self-harm: Low   Relapse: Not applicable   Other:    Safety Plan reviewed? No   If evidence of imminent risk is present, intervention/plan:     Therapeutic Intervention(s): Clarify:  Clarify feelings and Clarify thoughts, Cognitive modification, Positive behavior reinforced, Self-care skills, Stressors assessed and Supportive psychotherapy    Treatment Goal(s)/Objective(s) addressed: id pt stressors including working from home and doing  at the same time, clarified pt is overwhelmed and frustrated a lot of the time, using cognitive mod helped id what she can and what she cannot control, reinforced pt for letting go of what she cannot control, encouraged gentle self care including setting 10 mins aside to ride the tread mill or sew or thnik, provided support for pt to take time for herself    Progress toward Treatment Goals: Mild improvement    Plan:  - \"Homework\" recommendation: pt agreed to think of a way she can find 10 min to either sew, ride the treadmill or just think while working from home and sheltering in place and pt will report back in next " therapy session    ESTRELLA Chan  7/22/2020

## 2020-08-18 ENCOUNTER — APPOINTMENT (OUTPATIENT)
Dept: BEHAVIORAL HEALTH | Facility: CLINIC | Age: 40
End: 2020-08-18
Payer: COMMERCIAL

## 2020-09-10 ENCOUNTER — TELEMEDICINE (OUTPATIENT)
Dept: BEHAVIORAL HEALTH | Facility: CLINIC | Age: 40
End: 2020-09-10
Payer: COMMERCIAL

## 2020-09-10 DIAGNOSIS — F41.8 DEPRESSION WITH ANXIETY: ICD-10-CM

## 2020-09-10 DIAGNOSIS — Z56.6 WORK-RELATED STRESS: ICD-10-CM

## 2020-09-10 PROCEDURE — 90834 PSYTX W PT 45 MINUTES: CPT | Mod: 95,CR | Performed by: MARRIAGE & FAMILY THERAPIST

## 2020-09-10 SDOH — HEALTH STABILITY - MENTAL HEALTH: OTHER PHYSICAL AND MENTAL STRAIN RELATED TO WORK: Z56.6

## 2020-09-10 NOTE — BH THERAPY
" [facility]  Therapy Progress Note    Patient Name: Lily Vasquez  Patient MRN: [id number]  Today's Date: 9/10/2020     Type of session:Individual psychotherapy  Length of session: 45 minutes  Persons in attendance:Patient    Subjective/New Info: this confidential 45 min zoom psychotherapy session was authorized by covid and approved by pt. 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     Current risk:   SUICIDE: Not applicable   Homicide: Not applicable   Self-harm: Not applicable   Relapse: Not applicable   Other:    Safety Plan reviewed? Not Indicated   If evidence of imminent risk is present, intervention/plan:     Therapeutic Intervention(s): Clarify:  Clarify feelings and Clarify thoughts, Cognitive modification, Positive behavior reinforced, Self-care skills, Stressors assessed and Supportive psychotherapy    Treatment Goal(s)/Objective(s) addressed: id pt stressors including getting promoted at work and still having parenting responsibilities to home school, clarified pt feels inadequate, using cognitive mod helped pt incorporate mom's help from heaven, reinforced pt for believing, encouraged gentle self care andprovided support for pt     Progress toward Treatment Goals: Mild improvement    Plan:  - \"Homework\" recommendation: pt agreed to incorporate moms ways for managing and doing well and pt agreed to report back in next therapy session    ESTRELLA Chan  9/10/2020                                     "

## 2020-10-19 ENCOUNTER — IMMUNIZATION (OUTPATIENT)
Dept: SOCIAL WORK | Facility: CLINIC | Age: 40
End: 2020-10-19

## 2020-10-19 DIAGNOSIS — Z23 NEED FOR VACCINATION: ICD-10-CM

## 2020-10-19 PROCEDURE — 90686 IIV4 VACC NO PRSV 0.5 ML IM: CPT | Performed by: REGISTERED NURSE

## 2020-11-25 ENCOUNTER — TELEMEDICINE (OUTPATIENT)
Dept: BEHAVIORAL HEALTH | Facility: CLINIC | Age: 40
End: 2020-11-25
Payer: COMMERCIAL

## 2020-11-25 DIAGNOSIS — Z71.89 COUNSELING FOR BEREAVEMENT: ICD-10-CM

## 2020-11-25 DIAGNOSIS — F41.8 DEPRESSION WITH ANXIETY: ICD-10-CM

## 2020-11-25 PROCEDURE — 90834 PSYTX W PT 45 MINUTES: CPT | Mod: 95,CR | Performed by: MARRIAGE & FAMILY THERAPIST

## 2020-11-25 NOTE — BH THERAPY
" [facility]  Therapy Progress Note    Patient Name: Lily Vasquez  Patient MRN: [id number]  Today's Date: 2020     Type of session:Individual psychotherapy  Length of session: 45 minutes  Persons in attendance:Patient    Subjective/New Info: this confidential 45 min zoom psychotherapy session was authorized by covid and approved by pt. Pt is missing her mother sep thru the holidays    Objective/Observations:   Participation: Active verbal participation   Grooming: Casual   Cognition: Alert   Eye contact: Good   Mood: Depressed   Affect: Sad and Tearful   Thought process: Goal-directed   Speech: Rate within normal limits and Volume within normal limits   Other:     Diagnoses: No diagnosis found.     Current risk:   SUICIDE: Low   Homicide: Not applicable   Self-harm: Not applicable   Relapse: Not applicable   Other:    Safety Plan reviewed? Not Indicated   If evidence of imminent risk is present, intervention/plan:     Therapeutic Intervention(s): Clarify:  Clarify feelings and Clarify thoughts, Cognitive modification, Positive behavior reinforced, Self-care skills, Stressors assessed and Supportive psychotherapy    Treatment Goal(s)/Objective(s) addressed: id pt stressors including the anniversary of her mother's death and the holidays, clarified pt is feeling deep grief for her  mother, using cognitive mod helped pt id what she thinks her mother would say and do if she were alive, reinforced pt for imagining her moms presence, encouraged gentle self care and provided support for pt    Progress toward Treatment Goals: Mild improvement    Plan:  - \"Homework\" recommendation: pt agreed to honor her mom throughout this holiday and to report back in next therapy session    ESTRELLA Chan  2020                                     "

## 2020-12-20 DIAGNOSIS — Z23 NEED FOR VACCINATION: ICD-10-CM

## 2020-12-21 ENCOUNTER — APPOINTMENT (OUTPATIENT)
Dept: FAMILY PLANNING/WOMEN'S HEALTH CLINIC | Facility: IMMUNIZATION CENTER | Age: 40
End: 2020-12-21
Attending: FAMILY MEDICINE
Payer: COMMERCIAL

## 2020-12-21 ENCOUNTER — IMMUNIZATION (OUTPATIENT)
Dept: FAMILY PLANNING/WOMEN'S HEALTH CLINIC | Facility: IMMUNIZATION CENTER | Age: 40
End: 2020-12-21
Payer: COMMERCIAL

## 2020-12-21 DIAGNOSIS — Z23 ENCOUNTER FOR VACCINATION: Primary | ICD-10-CM

## 2020-12-21 DIAGNOSIS — Z23 NEED FOR VACCINATION: ICD-10-CM

## 2020-12-21 PROCEDURE — 0001A PFIZER SARS-COV-2 VACCINE: CPT

## 2020-12-21 PROCEDURE — 91300 PFIZER SARS-COV-2 VACCINE: CPT

## 2021-01-11 ENCOUNTER — IMMUNIZATION (OUTPATIENT)
Dept: FAMILY PLANNING/WOMEN'S HEALTH CLINIC | Facility: IMMUNIZATION CENTER | Age: 41
End: 2021-01-11
Attending: FAMILY MEDICINE
Payer: COMMERCIAL

## 2021-01-11 DIAGNOSIS — Z23 ENCOUNTER FOR VACCINATION: Primary | ICD-10-CM

## 2021-01-11 PROCEDURE — 91300 PFIZER SARS-COV-2 VACCINE: CPT

## 2021-01-11 PROCEDURE — 0002A PFIZER SARS-COV-2 VACCINE: CPT

## 2021-01-12 ENCOUNTER — OFFICE VISIT (OUTPATIENT)
Dept: URGENT CARE | Facility: PHYSICIAN GROUP | Age: 41
End: 2021-01-12
Payer: COMMERCIAL

## 2021-01-12 VITALS
OXYGEN SATURATION: 99 % | RESPIRATION RATE: 16 BRPM | HEART RATE: 93 BPM | SYSTOLIC BLOOD PRESSURE: 108 MMHG | TEMPERATURE: 98.1 F | BODY MASS INDEX: 35.59 KG/M2 | DIASTOLIC BLOOD PRESSURE: 82 MMHG | HEIGHT: 62 IN | WEIGHT: 193.4 LBS

## 2021-01-12 DIAGNOSIS — K11.20 PAROTIDITIS: ICD-10-CM

## 2021-01-12 PROCEDURE — 99213 OFFICE O/P EST LOW 20 MIN: CPT | Performed by: PHYSICIAN ASSISTANT

## 2021-01-12 RX ORDER — CEPHALEXIN 500 MG/1
500 CAPSULE ORAL 4 TIMES DAILY
Qty: 28 CAP | Refills: 0 | Status: SHIPPED | OUTPATIENT
Start: 2021-01-12 | End: 2021-01-19

## 2021-01-12 ASSESSMENT — ENCOUNTER SYMPTOMS
CLAUDICATION: 0
TREMORS: 0
TINGLING: 0
HEADACHES: 0
SPEECH CHANGE: 0
SHORTNESS OF BREATH: 0
DIZZINESS: 0
FACIAL SWELLING: 1
SENSORY CHANGE: 0
ABDOMINAL PAIN: 0
BLURRED VISION: 0
NAUSEA: 0
DIARRHEA: 0
FEVER: 0
PALPITATIONS: 0
FOCAL WEAKNESS: 0
ORTHOPNEA: 0
SEIZURES: 0
VOMITING: 0
WEAKNESS: 0
CHILLS: 0
DOUBLE VISION: 0
LOSS OF CONSCIOUSNESS: 0
COUGH: 0

## 2021-01-12 ASSESSMENT — FIBROSIS 4 INDEX: FIB4 SCORE: 0.59

## 2021-01-13 NOTE — PATIENT INSTRUCTIONS
Parotitis    Parotitis is inflammation of one or both of your parotid glands. These glands produce saliva. They are found on each side of your face, below and in front of your earlobes. The saliva that they produce comes out of tiny openings (ducts) inside your cheeks.  Parotitis may cause sudden swelling and pain (acute parotitis). It can also cause repeated episodes of swelling and pain or continued swelling that may or may not be painful (chronic parotitis).  What are the causes?  This condition may be caused by:  · Infections from bacteria.  · Infections from viruses, such as mumps or HIV.  · Blockage (obstruction) of saliva flow through the parotid glands. This can be from a stone, scar tissue, or a tumor.  · Diseases that cause your body's defense system (immune system) to attack healthy cells in your salivary glands. These are called autoimmune diseases.  What increases the risk?  You are more likely to develop this condition if:  · You are 50 years old or older.  · You do not drink enough fluids (are dehydrated).  · You drink too much alcohol.  · You have:  ? A dry mouth.  ? Poor dental hygiene.  ? Diabetes.  ? Gout.  ? A long-term illness.  · You have had radiation treatments to the head and neck.  · You take certain medicines.  What are the signs or symptoms?  Symptoms of this condition depend on the cause. Symptoms may include:  · Swelling under and in front of the ear. This may get worse after eating.  · Redness of the skin over the parotid gland.  · Pain and tenderness over the parotid gland. This may get worse after eating.  · Fever or chills.  · Pus coming from the ducts inside the mouth.  · Dry mouth.  · A bad taste in the mouth.  How is this diagnosed?  This condition may be diagnosed based on:  · Your medical history.  · A physical exam.  · Tests to find the cause of the parotitis. These may include:  ? Doing blood tests to check for an autoimmune disease or infections from a virus.  ? Taking a  fluid sample from the parotid gland and testing it for infection.  ? Injecting the ducts of the parotid gland with a dye and then taking X-rays (sialogram).  ? Having other imaging tests of the gland, such as X-rays, ultrasound, MRI, or CT scan.  ? Checking the opening of the gland for a stone or obstruction.  ? Placing a needle into the gland to remove tissue for a biopsy (fine needle aspiration).  How is this treated?  Treatment for this condition depends on the cause. Treatment may include:  · Antibiotic medicine for a bacterial infection.  · Drinking more fluids.  · Removing a stone or obstruction.  · Treating an underlying disease that is causing parotitis.  · Surgery to drain an infection, remove a growth, or remove the whole gland (parotidectomy).  Treatment may not be needed if parotid swelling goes away with home care.  Follow these instructions at home:  Medicines    · Take over-the-counter and prescription medicines only as told by your health care provider.  · If you were prescribed an antibiotic medicine, take it as told by your health care provider. Do not stop taking the antibiotic even if you start to feel better.  Managing pain and swelling  · If directed, apply heat to the affected area as often as told by your health care provider. Use the heat source that your health care provider recommends, such as a moist heat pack or a heating pad. To apply the heat:  ? Place a towel between your skin and the heat source.  ? Leave the heat on for 20-30 minutes.  ? Remove the heat if your skin turns bright red. This is especially important if you are unable to feel pain, heat, or cold. You may have a greater risk of getting burned.  · Gargle with a salt-water mixture 3-4 times a day or as needed. To make a salt-water mixture, completely dissolve ½-1 tsp (3-6 g) of salt in 1 cup (237 mL) of warm water.  · Gently massage the parotid glands as told by your health care provider.  General instructions    · Drink  enough fluid to keep your urine pale yellow.  · Keep your mouth clean and moist.  · Try sucking on sour candy. This may help to make your mouth less dry by stimulating the flow of saliva.  · Maintain good oral health.  ? Brush your teeth at least two times a day.  ? Floss your teeth every day.  ? See your dentist regularly.  · Do not use any products that contain nicotine or tobacco, such as cigarettes, e-cigarettes, and chewing tobacco. If you need help quitting, ask your health care provider.  · Do not drink alcohol.  · Keep all follow-up visits as told by your health care provider. This is important.  Contact a health care provider if:  · You have a fever or chills.  · You have new symptoms.  · Your symptoms get worse.  · Your symptoms do not improve with treatment.  Get help right away if:  · You have difficulty breathing or swallowing because of the swollen gland.  Summary  · Parotitis is inflammation of one or both of your parotid glands.  · Symptoms include pain and swelling under and in front of the ear. They may also include a fever and a bad taste in your mouth.  · This condition may be treated with antibiotics, increasing fluids, or surgery.  · In some cases, parotitis may go away on its own without treatment.  · You should drink plenty of fluids, maintain good oral hygiene, and avoid tobacco products.  This information is not intended to replace advice given to you by your health care provider. Make sure you discuss any questions you have with your health care provider.  Document Released: 06/09/2003 Document Revised: 07/16/2019 Document Reviewed: 07/16/2019  Scheduling Employee Scheduling Software Patient Education © 2020 [employer]

## 2021-01-13 NOTE — PROGRESS NOTES
Subjective:   Lily Vasquez is a 40 y.o. female who presents for Ear Pain (L side ear pain, Swelling on L side of face, started this morning )      Facial Swelling  This is a new problem. The current episode started today. The problem occurs constantly. Pertinent negatives include no abdominal pain, chest pain, chills, coughing, fever, headaches, nausea, rash, vomiting or weakness. Nothing aggravates the symptoms. She has tried nothing for the symptoms.       Review of Systems   Constitutional: Negative for chills and fever.   HENT: Positive for facial swelling.         Facial swelling   Eyes: Negative for blurred vision and double vision.   Respiratory: Negative for cough and shortness of breath.    Cardiovascular: Negative for chest pain, palpitations, orthopnea, claudication and leg swelling.   Gastrointestinal: Negative for abdominal pain, diarrhea, nausea and vomiting.   Skin: Negative for rash.   Neurological: Negative for dizziness, tingling, tremors, sensory change, speech change, focal weakness, seizures, loss of consciousness, weakness and headaches.   All other systems reviewed and are negative.      Medications:    • albuterol Aers  • calcium carbonate Chew  • cephALEXin Caps  • ibuprofen Tabs  • SUDAFED PO  • Vitamin D (Cholecalciferol) Tabs    Allergies: Codeine, Morphine, Sulfa drugs, and Tape    Problem List: Lily Vasquez has Gastroesophageal reflux disease without esophagitis; Hemorrhoid; Fatty liver; Vitamin D deficiency; Preventative health care; TMJ (temporomandibular joint syndrome); Hiatal hernia; Obesity (BMI 30.0-34.9); PCOS (polycystic ovarian syndrome); Weight gain; RLS (restless legs syndrome); Chronic insomnia; Central sleep apnea; Bereavement; Eating disorder; Partner relationship problem; and Depression with anxiety on their problem list.    Surgical History:  Past Surgical History:   Procedure Laterality Date   • LISBET BY LAPAROSCOPY  12/18/2012    Performed by Idania Camara M.D. at  "SURGERY [facility] ORS   • LARYNGOSCOPY  7/6/2011    Performed by EDNA MINA at SURGERY SAME DAY [facility] ORS   • ESOPHAGOSCOPY  7/6/2011    Performed by EDNA MINA at SURGERY SAME DAY [facility] ORS   • TONSILLECTOMY AND ADENOIDECTOMY  1991   • OTHER  1991    TONSILLECTOMY    • CHOLECYSTECTOMY         Past Social Hx: Lily Vasquez  reports that she has never smoked. She has never used smokeless tobacco. She reports that she does not drink alcohol or use drugs.     Past Family Hx:  Lily Vasquez family history includes Alcohol abuse in her father; Arthritis in her mother; Cancer in her paternal grandfather; Diabetes in her paternal grandmother; Heart Disease in her maternal grandfather, maternal grandmother, and paternal grandmother; Hyperlipidemia in her mother; Hypertension in her mother; Psychiatric Illness in her father; Sleep Apnea in her mother; Stroke in an other family member; Thyroid in her sister.     Problem list, medications, and allergies reviewed by myself today in Epic.     Objective:     Blood Pressure 108/82 (BP Location: Left arm, Patient Position: Sitting, BP Cuff Size: Large adult)   Pulse 93   Temperature 36.7 °C (98.1 °F) (Temporal)   Respiration 16   Height 1.575 m (5' 2\")   Weight 87.7 kg (193 lb 6.4 oz)   Oxygen Saturation 99%   Body Mass Index 35.37 kg/m²     Physical Exam  Vitals signs reviewed.   Constitutional:       General: She is not in acute distress.     Appearance: She is well-developed. She is not ill-appearing, toxic-appearing or diaphoretic.   HENT:      Head: Normocephalic and atraumatic.        Right Ear: External ear normal.      Left Ear: External ear normal.   Eyes:      Conjunctiva/sclera: Conjunctivae normal.   Neck:      Musculoskeletal: Normal range of motion and neck supple.   Cardiovascular:      Rate and Rhythm: Normal rate and regular rhythm.      Pulses: Normal pulses.      Heart sounds: Normal heart sounds.   Pulmonary:      Effort: Pulmonary " effort is normal.      Breath sounds: Normal breath sounds.   Musculoskeletal:         General: Tenderness present. No deformity.      Comments: No joint pain above or below injury.  Neurovascularly intact distally from injury.     Skin:     General: Skin is warm and dry.   Neurological:      Mental Status: She is alert and oriented to person, place, and time.      Motor: No abnormal muscle tone.      Coordination: Coordination normal.      Deep Tendon Reflexes: Reflexes are normal and symmetric. Reflexes normal.   Psychiatric:         Behavior: Behavior normal.         Thought Content: Thought content normal.         Judgment: Judgment normal.         Assessment/Plan:     Medical Decision Making/Comments   Patient is a 40-year-old female who presents for evaluation of left sided facial swelling.  X1 day.  Area is tender to the touch.  On exam it appears that she has swelling over the parotid gland.  No systemic symptoms at this time.  We will treat with OTC ibuprofen warm compress and sour candies.  Continue antibiotic given if symptoms continue or worsen.   Diagnosis and associated orders     1. Parotiditis  cephALEXin (KEFLEX) 500 MG Cap              Differential diagnosis, natural history, supportive care, and indications for immediate follow-up discussed.    Advised the patient to follow-up with the primary care physician for recheck, reevaluation, and consideration of further management.    Please note that this dictation was created using voice recognition software. I have made a reasonable attempt to correct obvious errors, but I expect that there are errors of grammar and possibly content that I did not discover before finalizing the note.

## 2021-07-30 ENCOUNTER — OFFICE VISIT (OUTPATIENT)
Dept: MEDICAL GROUP | Facility: PHYSICIAN GROUP | Age: 41
End: 2021-07-30
Payer: COMMERCIAL

## 2021-07-30 ENCOUNTER — APPOINTMENT (OUTPATIENT)
Dept: RADIOLOGY | Facility: IMAGING CENTER | Age: 41
End: 2021-07-30
Attending: FAMILY MEDICINE
Payer: COMMERCIAL

## 2021-07-30 VITALS
SYSTOLIC BLOOD PRESSURE: 118 MMHG | WEIGHT: 187 LBS | HEIGHT: 62 IN | BODY MASS INDEX: 34.41 KG/M2 | DIASTOLIC BLOOD PRESSURE: 72 MMHG | OXYGEN SATURATION: 97 % | HEART RATE: 96 BPM | TEMPERATURE: 98.7 F

## 2021-07-30 DIAGNOSIS — N92.1 METRORRHAGIA: ICD-10-CM

## 2021-07-30 DIAGNOSIS — R91.8 LUNG FIELD ABNORMAL FINDING ON EXAMINATION: ICD-10-CM

## 2021-07-30 DIAGNOSIS — E55.9 VITAMIN D DEFICIENCY: ICD-10-CM

## 2021-07-30 DIAGNOSIS — G25.81 RLS (RESTLESS LEGS SYNDROME): ICD-10-CM

## 2021-07-30 DIAGNOSIS — Z12.31 ENCOUNTER FOR SCREENING MAMMOGRAM FOR BREAST CANCER: ICD-10-CM

## 2021-07-30 DIAGNOSIS — Z00.00 PREVENTATIVE HEALTH CARE: ICD-10-CM

## 2021-07-30 DIAGNOSIS — F51.3 SLEEPWALKING: ICD-10-CM

## 2021-07-30 DIAGNOSIS — F51.04 CHRONIC INSOMNIA: ICD-10-CM

## 2021-07-30 DIAGNOSIS — E28.2 PCOS (POLYCYSTIC OVARIAN SYNDROME): ICD-10-CM

## 2021-07-30 DIAGNOSIS — G47.31 CENTRAL SLEEP APNEA: ICD-10-CM

## 2021-07-30 PROBLEM — R63.5 WEIGHT GAIN: Status: RESOLVED | Noted: 2018-01-24 | Resolved: 2021-07-30

## 2021-07-30 PROCEDURE — 99214 OFFICE O/P EST MOD 30 MIN: CPT | Performed by: FAMILY MEDICINE

## 2021-07-30 PROCEDURE — 71046 X-RAY EXAM CHEST 2 VIEWS: CPT | Mod: TC | Performed by: FAMILY MEDICINE

## 2021-07-30 RX ORDER — TRAZODONE HYDROCHLORIDE 100 MG/1
50-100 TABLET ORAL
Qty: 30 TABLET | Refills: 3 | Status: SHIPPED | OUTPATIENT
Start: 2021-07-30 | End: 2021-08-23

## 2021-07-30 ASSESSMENT — PATIENT HEALTH QUESTIONNAIRE - PHQ9: CLINICAL INTERPRETATION OF PHQ2 SCORE: 0

## 2021-07-30 NOTE — PROGRESS NOTES
CC: Difficulty sleeping    HISTORY OF THE PRESENT ILLNESS: Patient is a 41 y.o. female.     Patient is here to establish care today.  Previous patient of Dr. Ochoa.    Primary concern is chronic issues sleeping.  This is a complicated issue for the patient.  She does have known central sleep apnea.  She is supposed to be using BiPAP, but notes that she got sick just prior to Covid and stopped using BiPAP, never resumed.  She also has a history of restless leg syndrome.  She notes that recently insomnia has become severe and she started to exhibit abnormal sleep behaviors including sleepwalking and waking up with objects in her hands such as an iron.    She has been previously referred to Dr. Chely Garnett, sleep behavioral specialist.  Again, Covid happened and she ended up not going.    She does note that complicating her sleep issue has been very heavy menstrual periods.  She does have a history of PCOS.  She has been told about a year ago that she should get a hysterectomy at an outside gynecologist.  She is interested in a referral to [facility] gynecology.    She has at this point willing to trial a medication to attempt to get some sleep as insomnia has been quite severe.    Allergies: Codeine, Morphine, Sulfa drugs, and Tape    Current Outpatient Medications Ordered in Epic   Medication Sig Dispense Refill   • traZODone (DESYREL) 100 MG Tab Take 0.5-1 Tablets by mouth at bedtime. 30 tablet 3   • Vitamin D, Cholecalciferol, (CHOLECALCIFEROL) 25 MCG (1000 UT) Tab Take  by mouth every day. 30 Tab 1   • Pseudoephedrine HCl (SUDAFED PO) Take  by mouth.     • calcium carbonate (TUMS) 500 MG Chew Tab Take 500 mg by mouth every day.     • ibuprofen (MOTRIN) 200 MG Tab Take 200 mg by mouth every 6 hours as needed.       No current Epic-ordered facility-administered medications on file.       Past Medical History:   Diagnosis Date   • Anesthesia     Severe PONV   • Arthritis     TMJ   • Bronchitis    • Chickenpox    •  "Dental disorder     permanent retainer   • Eating disorder 7/10/2018   • Fatty liver    • GERD (gastroesophageal reflux disease)    • Gestational diabetes    • Hemorrhoids    • Hiatus hernia syndrome    • Indigestion     GERD   • Influenza    • Neck pain    • Obesity    • Vitamin D deficiency        Past Surgical History:   Procedure Laterality Date   • LISBET BY LAPAROSCOPY  12/18/2012    Performed by Idania Camara M.D. at SURGERY [facility] ORS   • LARYNGOSCOPY  7/6/2011    Performed by EDNA MINA at SURGERY SAME DAY [facility] ORS   • ESOPHAGOSCOPY  7/6/2011    Performed by EDNA MINA at SURGERY SAME DAY [facility] ORS   • TONSILLECTOMY AND ADENOIDECTOMY  1991   • OTHER  1991    TONSILLECTOMY    • CHOLECYSTECTOMY         Social History     Tobacco Use   • Smoking status: Never Smoker   • Smokeless tobacco: Never Used   Vaping Use   • Vaping Use: Never used   Substance Use Topics   • Alcohol use: No   • Drug use: No       Social History     Social History Narrative   • Not on file       Family History   Problem Relation Age of Onset   • Arthritis Mother    • Hypertension Mother    • Hyperlipidemia Mother    • Sleep Apnea Mother    • Psychiatric Illness Father    • Alcohol abuse Father    • Thyroid Sister    • Heart Disease Maternal Grandmother    • Heart Disease Maternal Grandfather    • Heart Disease Paternal Grandmother    • Diabetes Paternal Grandmother    • Cancer Paternal Grandfather         brain   • Stroke Other        Exam: /72 (BP Location: Left arm, Patient Position: Sitting, BP Cuff Size: Adult)   Pulse 96   Temp 37.1 °C (98.7 °F) (Temporal)   Ht 1.575 m (5' 2\")   Wt 84.8 kg (187 lb)   SpO2 97%  Body mass index is 34.2 kg/m².    General: Well appearing, NAD  Pulmonary: Clear to ausculation with the exception of rhonchi noted in right upper lung field.  Cardiovascular: Regular rate and rhythm without murmur, rubs or gallop.   Skin: Warm and dry.  No obvious " lesions.  Musculoskeletal:  No extremity cyanosis, clubbing, or edema.  Psych: Normal mood and affect. Alert and oriented. Judgment and insight is normal.    Please note that this dictation was created using voice recognition software. I have made every reasonable attempt to correct obvious errors, but I expect that there are errors of grammar and possibly content that I did not discover before finalizing the note.      Assessment/Plan  Lily was seen today for establish care and insomnia.    Diagnoses and all orders for this visit:    Central sleep apnea  Sleepwalking  Chronic insomnia  RLS (restless legs syndrome)  Patient has very obviously complex sleep issues.  Certainly recommend referral back to sleep medicine at this time.  Also recommend referral to Dr. Chely blanco and sleep behavioral medicine.  Patient has such severe sleep issues at this time that she would like to trial medication.  Given her sleepwalking, I would prefer to stay away from benzos or sedative hypnotic medications.  We will go ahead and trial trazodone.  Close follow-up in 2 weeks.  -     REFERRAL TO PULMONARY AND SLEEP MEDICINE  -     REFERRAL TO BEHAVIORAL HEALTH  -     traZODone (DESYREL) 100 MG Tab; Take 0.5-1 Tablets by mouth at bedtime.    PCOS (polycystic ovarian syndrome)  Metrorrhagia  Chronic issue, will refer to gynecology here at [facility].  Will check A1c with lab work given history of PCOS.  -     REFERRAL TO GYNECOLOGY  -     HEMOGLOBIN A1C; Future dear    Vitamin D deficiency  Chronic issue, has been on supplementation.  Will recheck levels at this time.  -     VITAMIN D,25 HYDROXY; Future    Encounter for screening mammogram for breast cancer  -     MA-SCREENING MAMMO BILAT W/TOMOSYNTHESIS W/CAD; Future    Lung field abnormal finding on examination  Patient had some minimal rhonchi/wheezing in the right upper lung field.  She noted she had some pain there a few days ago which spontaneously resolved.  X-ray obtained today and  was negative.  -     DX-CHEST-2 VIEWS; Future    Preventative health care  -     CBC WITH DIFFERENTIAL; Future  -     Comp Metabolic Panel; Future  -     VITAMIN D,25 HYDROXY; Future  -     Lipid Profile; Future  -     TSH WITH REFLEX TO FT4; Future      Follow-up in 2 weeks for sleep disorder and lab review.    Maria Victoria Kam DO  [facility]

## 2021-08-06 ENCOUNTER — HOSPITAL ENCOUNTER (OUTPATIENT)
Dept: LAB | Facility: MEDICAL CENTER | Age: 41
End: 2021-08-06
Attending: FAMILY MEDICINE
Payer: COMMERCIAL

## 2021-08-06 ENCOUNTER — HOSPITAL ENCOUNTER (OUTPATIENT)
Dept: RADIOLOGY | Facility: MEDICAL CENTER | Age: 41
End: 2021-08-06
Attending: FAMILY MEDICINE
Payer: COMMERCIAL

## 2021-08-06 DIAGNOSIS — Z12.31 ENCOUNTER FOR SCREENING MAMMOGRAM FOR BREAST CANCER: ICD-10-CM

## 2021-08-06 DIAGNOSIS — E55.9 VITAMIN D DEFICIENCY: ICD-10-CM

## 2021-08-06 DIAGNOSIS — E28.2 PCOS (POLYCYSTIC OVARIAN SYNDROME): ICD-10-CM

## 2021-08-06 DIAGNOSIS — Z00.00 PREVENTATIVE HEALTH CARE: ICD-10-CM

## 2021-08-06 LAB
25(OH)D3 SERPL-MCNC: 26 NG/ML (ref 30–100)
ALBUMIN SERPL BCP-MCNC: 4.2 G/DL (ref 3.2–4.9)
ALBUMIN/GLOB SERPL: 1.3 G/DL
ALP SERPL-CCNC: 61 U/L (ref 30–99)
ALT SERPL-CCNC: 15 U/L (ref 2–50)
ANION GAP SERPL CALC-SCNC: 12 MMOL/L (ref 7–16)
AST SERPL-CCNC: 21 U/L (ref 12–45)
BASOPHILS # BLD AUTO: 0.6 % (ref 0–1.8)
BASOPHILS # BLD: 0.05 K/UL (ref 0–0.12)
BILIRUB SERPL-MCNC: 0.3 MG/DL (ref 0.1–1.5)
BUN SERPL-MCNC: 11 MG/DL (ref 8–22)
CALCIUM SERPL-MCNC: 9.4 MG/DL (ref 8.5–10.5)
CHLORIDE SERPL-SCNC: 102 MMOL/L (ref 96–112)
CHOLEST SERPL-MCNC: 130 MG/DL (ref 100–199)
CO2 SERPL-SCNC: 22 MMOL/L (ref 20–33)
CREAT SERPL-MCNC: 0.8 MG/DL (ref 0.5–1.4)
EOSINOPHIL # BLD AUTO: 0.29 K/UL (ref 0–0.51)
EOSINOPHIL NFR BLD: 3.6 % (ref 0–6.9)
ERYTHROCYTE [DISTWIDTH] IN BLOOD BY AUTOMATED COUNT: 42 FL (ref 35.9–50)
EST. AVERAGE GLUCOSE BLD GHB EST-MCNC: 117 MG/DL
FASTING STATUS PATIENT QL REPORTED: NORMAL
GLOBULIN SER CALC-MCNC: 3.2 G/DL (ref 1.9–3.5)
GLUCOSE SERPL-MCNC: 85 MG/DL (ref 65–99)
HBA1C MFR BLD: 5.7 % (ref 4–5.6)
HCT VFR BLD AUTO: 33.9 % (ref 37–47)
HDLC SERPL-MCNC: 41 MG/DL
HGB BLD-MCNC: 10.3 G/DL (ref 12–16)
IMM GRANULOCYTES # BLD AUTO: 0.02 K/UL (ref 0–0.11)
IMM GRANULOCYTES NFR BLD AUTO: 0.3 % (ref 0–0.9)
LDLC SERPL CALC-MCNC: 68 MG/DL
LYMPHOCYTES # BLD AUTO: 2.21 K/UL (ref 1–4.8)
LYMPHOCYTES NFR BLD: 27.8 % (ref 22–41)
MCH RBC QN AUTO: 24.1 PG (ref 27–33)
MCHC RBC AUTO-ENTMCNC: 30.4 G/DL (ref 33.6–35)
MCV RBC AUTO: 79.4 FL (ref 81.4–97.8)
MONOCYTES # BLD AUTO: 0.57 K/UL (ref 0–0.85)
MONOCYTES NFR BLD AUTO: 7.2 % (ref 0–13.4)
NEUTROPHILS # BLD AUTO: 4.81 K/UL (ref 2–7.15)
NEUTROPHILS NFR BLD: 60.5 % (ref 44–72)
NRBC # BLD AUTO: 0 K/UL
NRBC BLD-RTO: 0 /100 WBC
PLATELET # BLD AUTO: 419 K/UL (ref 164–446)
PMV BLD AUTO: 10.3 FL (ref 9–12.9)
POTASSIUM SERPL-SCNC: 3.9 MMOL/L (ref 3.6–5.5)
PROT SERPL-MCNC: 7.4 G/DL (ref 6–8.2)
RBC # BLD AUTO: 4.27 M/UL (ref 4.2–5.4)
SODIUM SERPL-SCNC: 136 MMOL/L (ref 135–145)
TRIGL SERPL-MCNC: 107 MG/DL (ref 0–149)
TSH SERPL DL<=0.005 MIU/L-ACNC: 4.36 UIU/ML (ref 0.38–5.33)
WBC # BLD AUTO: 8 K/UL (ref 4.8–10.8)

## 2021-08-06 PROCEDURE — 80053 COMPREHEN METABOLIC PANEL: CPT

## 2021-08-06 PROCEDURE — 82306 VITAMIN D 25 HYDROXY: CPT

## 2021-08-06 PROCEDURE — 80061 LIPID PANEL: CPT

## 2021-08-06 PROCEDURE — 36415 COLL VENOUS BLD VENIPUNCTURE: CPT

## 2021-08-06 PROCEDURE — 84443 ASSAY THYROID STIM HORMONE: CPT

## 2021-08-06 PROCEDURE — 77063 BREAST TOMOSYNTHESIS BI: CPT

## 2021-08-06 PROCEDURE — 83036 HEMOGLOBIN GLYCOSYLATED A1C: CPT

## 2021-08-06 PROCEDURE — 85025 COMPLETE CBC W/AUTO DIFF WBC: CPT

## 2021-08-13 ENCOUNTER — OFFICE VISIT (OUTPATIENT)
Dept: MEDICAL GROUP | Facility: PHYSICIAN GROUP | Age: 41
End: 2021-08-13
Payer: COMMERCIAL

## 2021-08-13 VITALS
TEMPERATURE: 97.7 F | HEIGHT: 62 IN | HEART RATE: 94 BPM | WEIGHT: 185 LBS | SYSTOLIC BLOOD PRESSURE: 118 MMHG | BODY MASS INDEX: 34.04 KG/M2 | DIASTOLIC BLOOD PRESSURE: 62 MMHG | OXYGEN SATURATION: 97 %

## 2021-08-13 DIAGNOSIS — D50.9 IRON DEFICIENCY ANEMIA, UNSPECIFIED IRON DEFICIENCY ANEMIA TYPE: ICD-10-CM

## 2021-08-13 DIAGNOSIS — G47.69 SLEEP-RELATED MOVEMENT DISORDER: ICD-10-CM

## 2021-08-13 DIAGNOSIS — E55.9 VITAMIN D DEFICIENCY: ICD-10-CM

## 2021-08-13 DIAGNOSIS — R73.03 PREDIABETES: ICD-10-CM

## 2021-08-13 DIAGNOSIS — G47.31 CENTRAL SLEEP APNEA: ICD-10-CM

## 2021-08-13 PROCEDURE — 99214 OFFICE O/P EST MOD 30 MIN: CPT | Performed by: FAMILY MEDICINE

## 2021-08-13 RX ORDER — FERROUS SULFATE 325(65) MG
325 TABLET ORAL DAILY
Qty: 90 TABLET | Refills: 0 | Status: SHIPPED | OUTPATIENT
Start: 2021-08-13 | End: 2021-08-23

## 2021-08-13 ASSESSMENT — FIBROSIS 4 INDEX: FIB4 SCORE: 0.53

## 2021-08-14 NOTE — PROGRESS NOTES
CC: Lab review    HISTORY OF THE PRESENT ILLNESS: Patient is a 41 y.o. female.     Patient is here today for lab review and follow-up on sleep.    Allergies: Codeine, Morphine, Sulfa drugs, and Tape    Current Outpatient Medications Ordered in Epic   Medication Sig Dispense Refill   • ferrous sulfate 325 (65 Fe) MG tablet Take 1 Tablet by mouth every day. 90 Tablet 0   • traZODone (DESYREL) 100 MG Tab Take 0.5-1 Tablets by mouth at bedtime. 30 tablet 3   • Vitamin D, Cholecalciferol, (CHOLECALCIFEROL) 25 MCG (1000 UT) Tab Take  by mouth every day. 30 Tab 1   • Pseudoephedrine HCl (SUDAFED PO) Take  by mouth.     • calcium carbonate (TUMS) 500 MG Chew Tab Take 500 mg by mouth every day.     • ibuprofen (MOTRIN) 200 MG Tab Take 200 mg by mouth every 6 hours as needed.       No current Epic-ordered facility-administered medications on file.       Past Medical History:   Diagnosis Date   • Anesthesia     Severe PONV   • Arthritis     TMJ   • Bronchitis    • Chickenpox    • Dental disorder     permanent retainer   • Eating disorder 7/10/2018   • Fatty liver    • GERD (gastroesophageal reflux disease)    • Gestational diabetes    • Hemorrhoids    • Hiatus hernia syndrome    • Indigestion     GERD   • Influenza    • Neck pain    • Obesity    • Vitamin D deficiency        Past Surgical History:   Procedure Laterality Date   • LISBET BY LAPAROSCOPY  12/18/2012    Performed by Idania Camara M.D. at SURGERY [facility] ORS   • LARYNGOSCOPY  7/6/2011    Performed by EDNA MINA at SURGERY SAME DAY [facility] ORS   • ESOPHAGOSCOPY  7/6/2011    Performed by EDNA MINA at SURGERY SAME DAY [facility] ORS   • TONSILLECTOMY AND ADENOIDECTOMY  1991   • OTHER  1991    TONSILLECTOMY    • CHOLECYSTECTOMY         Social History     Tobacco Use   • Smoking status: Never Smoker   • Smokeless tobacco: Never Used   Vaping Use   • Vaping Use: Never used   Substance Use Topics   • Alcohol use: No   • Drug use: No       Social History  "    Social History Narrative   • Not on file       Family History   Problem Relation Age of Onset   • Arthritis Mother    • Hypertension Mother    • Hyperlipidemia Mother    • Sleep Apnea Mother    • Psychiatric Illness Father    • Alcohol abuse Father    • Thyroid Sister    • Heart Disease Maternal Grandmother    • Heart Disease Maternal Grandfather    • Heart Disease Paternal Grandmother    • Diabetes Paternal Grandmother    • Cancer Paternal Grandfather         brain   • Stroke Other      Exam: /62 (BP Location: Right arm, Patient Position: Sitting, BP Cuff Size: Adult)   Pulse 94   Temp 36.5 °C (97.7 °F) (Temporal)   Ht 1.575 m (5' 2\")   Wt 83.9 kg (185 lb)   SpO2 97%  Body mass index is 33.84 kg/m².    General: Well appearing, NAD  Skin: Warm and dry.  No obvious lesions.  Musculoskeletal:  No extremity cyanosis, clubbing, or edema.  Psych: Normal mood and affect. Alert and oriented. Judgment and insight is normal.    Please note that this dictation was created using voice recognition software. I have made every reasonable attempt to correct obvious errors, but I expect that there are errors of grammar and possibly content that I did not discover before finalizing the note.      Assessment/Plan  Lily was seen today for follow-up.    Diagnoses and all orders for this visit:    Iron deficiency anemia, unspecified iron deficiency anemia type  Noted on labs, secondary to metrorrhagia.  She has yet to get scheduled with gynecology but is on the wait list to do so.  She does note restless leg syndrome, so iron deficiency is likely exacerbating this.  We will go ahead and start iron supplementation and recheck labs in about 3 months.  -     CBC WITH DIFFERENTIAL; Future  -     IRON/TOTAL IRON BIND; Future  -     FERRITIN; Future  -     ferrous sulfate 325 (65 Fe) MG tablet; Take 1 Tablet by mouth every day.    Central sleep apnea  Sleep-related movement disorder  Ongoing issue.  Trazodone was not helpful " for her symptoms and they remain quite severe.  She fortunately does have follow-up with sleep medicine on the 23rd of this month.    Prediabetes  A1c noted to be 5.7, mild.  Discussed appropriate lifestyle changes such as cutting back on sugars and starches in addition to regular exercise.    Vitamin D deficiency  She notes she was previously on a vitamin D supplement, but has not recently been taking one.  Recommend restarting at this time.    Follow-up in about 6 months for routine care or sooner if needed.    Maria Victoria Kam,   [facility]

## 2021-08-17 ENCOUNTER — APPOINTMENT (OUTPATIENT)
Dept: RADIOLOGY | Facility: IMAGING CENTER | Age: 41
End: 2021-08-17
Attending: PHYSICIAN ASSISTANT
Payer: COMMERCIAL

## 2021-08-17 ENCOUNTER — OFFICE VISIT (OUTPATIENT)
Dept: URGENT CARE | Facility: CLINIC | Age: 41
End: 2021-08-17
Payer: COMMERCIAL

## 2021-08-17 VITALS
WEIGHT: 188 LBS | HEART RATE: 84 BPM | RESPIRATION RATE: 16 BRPM | HEIGHT: 62 IN | DIASTOLIC BLOOD PRESSURE: 86 MMHG | TEMPERATURE: 97.7 F | BODY MASS INDEX: 34.6 KG/M2 | SYSTOLIC BLOOD PRESSURE: 124 MMHG | OXYGEN SATURATION: 97 %

## 2021-08-17 DIAGNOSIS — S93.401A SPRAIN OF RIGHT ANKLE, UNSPECIFIED LIGAMENT, INITIAL ENCOUNTER: ICD-10-CM

## 2021-08-17 PROCEDURE — 99213 OFFICE O/P EST LOW 20 MIN: CPT | Performed by: PHYSICIAN ASSISTANT

## 2021-08-17 PROCEDURE — 73610 X-RAY EXAM OF ANKLE: CPT | Mod: TC,FY,RT | Performed by: PHYSICIAN ASSISTANT

## 2021-08-17 ASSESSMENT — PAIN SCALES - GENERAL: PAINLEVEL: 8=MODERATE-SEVERE PAIN

## 2021-08-17 ASSESSMENT — ENCOUNTER SYMPTOMS
COUGH: 0
BRUISES/BLEEDS EASILY: 0
PALPITATIONS: 0
HEADACHES: 0
FALLS: 1
DOUBLE VISION: 0
WHEEZING: 0
DIZZINESS: 0
MYALGIAS: 1
FEVER: 0
TINGLING: 0
CHILLS: 0
BLURRED VISION: 0

## 2021-08-17 ASSESSMENT — FIBROSIS 4 INDEX: FIB4 SCORE: 0.53

## 2021-08-17 NOTE — PROGRESS NOTES
Subjective:   Lily Vasquez is a 41 y.o. female who presents for Ankle Injury (x last night, right ankle unable to put any weight on it, painful )      HPI:  This is a very pleasant 41-year-old female presenting to the clinic with right ankle injury times last night.  Patient states she was sleepwalking when she must have rolled both ankles and fell backwards.  She is unaware of the mechanism in which she rolled the ankles.  Currently only has pain to her right ankle.  Pain is predominantly located over the anterior lateral aspect of the right ankle.  States it is extremely painful to bear any weight.  Painful to palpation or any movement.  Has not noticed any significant swelling or bruising.  Denies hitting her head or any loss of consciousness.  Today she states she does not have a headache, denies any visual change or dizziness.  Currently in a wheelchair in clinic.  No previous injury to this ankle.    Review of Systems   Constitutional: Negative for chills, fever and malaise/fatigue.   Eyes: Negative for blurred vision and double vision.   Respiratory: Negative for cough and wheezing.    Cardiovascular: Negative for chest pain and palpitations.   Musculoskeletal: Positive for falls, joint pain and myalgias.   Neurological: Negative for dizziness, tingling and headaches.   Endo/Heme/Allergies: Does not bruise/bleed easily.       Medications:    • calcium carbonate Chew  • ferrous sulfate  • ibuprofen Tabs  • SUDAFED PO  • traZODone Tabs  • Vitamin D (Cholecalciferol) Tabs    Allergies: Codeine, Morphine, Sulfa drugs, and Tape    Problem List: Lily Vasquez does not have any pertinent problems on file.    Surgical History:  Past Surgical History:   Procedure Laterality Date   • LISBET BY LAPAROSCOPY  12/18/2012    Performed by Idania Camara M.D. at SURGERY [facility] ORS   • LARYNGOSCOPY  7/6/2011    Performed by EDNA MINA at SURGERY SAME DAY [facility] ORS   • ESOPHAGOSCOPY  7/6/2011    Performed by  "EDNA MINA at SURGERY SAME DAY [facility] ORS   • TONSILLECTOMY AND ADENOIDECTOMY  1991   • OTHER  1991    TONSILLECTOMY    • CHOLECYSTECTOMY         Past Social Hx: Lily Vasquez  reports that she has never smoked. She has never used smokeless tobacco. She reports that she does not drink alcohol and does not use drugs.     Past Family Hx:  Lily Vasquez family history includes Alcohol abuse in her father; Arthritis in her mother; Cancer in her paternal grandfather; Diabetes in her paternal grandmother; Heart Disease in her maternal grandfather, maternal grandmother, and paternal grandmother; Hyperlipidemia in her mother; Hypertension in her mother; Psychiatric Illness in her father; Sleep Apnea in her mother; Stroke in an other family member; Thyroid in her sister.     Problem list, medications, and allergies reviewed by myself today in Epic.     Objective:     /86   Pulse 84   Temp 36.5 °C (97.7 °F) (Temporal)   Resp 16   Ht 1.575 m (5' 2\")   Wt 85.3 kg (188 lb)   SpO2 97%   BMI 34.39 kg/m²     Physical Exam  Constitutional:       General: She is not in acute distress.     Appearance: Normal appearance. She is not ill-appearing, toxic-appearing or diaphoretic.   HENT:      Head: Normocephalic and atraumatic.   Eyes:      Conjunctiva/sclera: Conjunctivae normal.   Cardiovascular:      Rate and Rhythm: Normal rate and regular rhythm.      Pulses: Normal pulses.      Heart sounds: Normal heart sounds.   Pulmonary:      Effort: Pulmonary effort is normal.      Breath sounds: Normal breath sounds. No wheezing.   Musculoskeletal:      Cervical back: Normal range of motion. No muscular tenderness.      Right ankle: No swelling, deformity, ecchymosis or lacerations. Tenderness present over the lateral malleolus and ATF ligament. No medial malleolus, CF ligament, base of 5th metatarsal or proximal fibula tenderness. Decreased range of motion.      Right Achilles Tendon: No tenderness or defects. Epps's " test negative.   Lymphadenopathy:      Cervical: No cervical adenopathy.   Skin:     General: Skin is warm and dry.      Capillary Refill: Capillary refill takes less than 2 seconds.   Neurological:      Mental Status: She is alert.   Psychiatric:         Mood and Affect: Mood normal.         Thought Content: Thought content normal.       RADIOLOGY RESULTS   DX-ANKLE 3+ VIEWS RIGHT    Result Date: 8/17/2021 8/17/2021 8:44 AM HISTORY/REASON FOR EXAM:  Pain/Deformity Following Trauma TECHNIQUE/EXAM DESCRIPTION AND NUMBER OF VIEWS:  3 views of the RIGHT ankle. COMPARISON: None FINDINGS: There is no evidence of fracture or dislocation.  The ankle mortise is well-maintained. The talar dome is preserved.  There is soft tissue swelling overlying the ankle. There is spurring of the calcaneus at the insertion of the Achilles tendon and plantar fascia.     1.  No evidence of fracture or dislocation. 2.  Calcaneal spurring. 3.  Mild soft tissue swelling.             Assessment/Plan:     Comments/MDM:     • X-rays were reviewed in clinic by myself.  I agree with the radiologist interpretation.  No evidence of fracture or bony abnormality.  • Patient currently unable to weight-bear.  • Placed the patient in an Ace wrap and a Cam walking boot.  • Crutches for the next 4 to 5 days gradually increase weightbearing as tolerated.  • Encouraged ice and elevation to decrease pain and swelling.  • Alternate Tylenol and ibuprofen as needed for pain.  • Follow-up in clinic for any persistence or worsening symptoms.  Encouraged to call with questions or concerns.     Diagnosis and associated orders:     1. Sprain of right ankle, unspecified ligament, initial encounter  DX-ANKLE 3+ VIEWS RIGHT              Differential diagnosis, natural history, supportive care, and indications for immediate follow-up discussed.    Advised the patient to follow-up with the primary care physician for recheck, reevaluation, and consideration of further  management.    Please note that this dictation was created using voice recognition software. I have made reasonable attempt to correct obvious errors, but I expect that there are errors of grammar and possibly content that I did not discover before finalizing the note.    This note was electronically signed by KAMALJIT Ponce PA-C

## 2021-08-21 DIAGNOSIS — F51.04 CHRONIC INSOMNIA: ICD-10-CM

## 2021-08-23 ENCOUNTER — SLEEP CENTER VISIT (OUTPATIENT)
Dept: SLEEP MEDICINE | Facility: MEDICAL CENTER | Age: 41
End: 2021-08-23
Payer: COMMERCIAL

## 2021-08-23 VITALS
SYSTOLIC BLOOD PRESSURE: 118 MMHG | HEART RATE: 99 BPM | RESPIRATION RATE: 16 BRPM | HEIGHT: 62 IN | OXYGEN SATURATION: 98 % | WEIGHT: 188 LBS | BODY MASS INDEX: 34.6 KG/M2 | DIASTOLIC BLOOD PRESSURE: 72 MMHG

## 2021-08-23 DIAGNOSIS — G47.31 COMPLEX SLEEP APNEA SYNDROME: ICD-10-CM

## 2021-08-23 DIAGNOSIS — Z78.9 NONSMOKER: ICD-10-CM

## 2021-08-23 DIAGNOSIS — F51.04 CHRONIC INSOMNIA: ICD-10-CM

## 2021-08-23 PROCEDURE — 99214 OFFICE O/P EST MOD 30 MIN: CPT | Performed by: NURSE PRACTITIONER

## 2021-08-23 ASSESSMENT — FIBROSIS 4 INDEX: FIB4 SCORE: 0.53

## 2021-08-23 NOTE — PATIENT INSTRUCTIONS
Inline bacteria filter for CPAP use - [employer] or [employer]    DME : [facility] Phone. [phone] Fax. [phone]

## 2021-08-23 NOTE — PROGRESS NOTES
Chief Complaint   Patient presents with   • Apnea     last seen 2/28/19   • Other     Pt hasnt used machine for over a year        HPI:  Lily Vasquez is a 41 y.o. year old female here today for follow-up on JAN.  Last OV 8/18/19 with Dr. Lerner     Currently using BIPAP @ 15/11cm H20 nightly; RESPIRONICS; device obtained 2018.  Compliance report not available today and patient notes not using device for over a year.    PSG split-night 6/9/2018 indicated moderate to severe sleep apnea with an overall AHI 29.6/h and O2 cassidy of 86%.  She responded well to CPAP 7 cm.  Per compliance check October 2018 patient noted persistent fatigue and daytime somnolence and compliance report noted residual AHI of 29.6/h.  She underwent dedicated titration study 11/16/2018 noting mild fragmentation of sleep related to increased sleep onset latency and had best response to BiPAP with reduced AHI of 0/h and a O2 cassidy of 91%.  She was started on BiPAP 15/11 cm.    We reviewed her sleep apnea history and prior testing and the need for treatment.  She notes of recent starting sleepwalking in the last month.  She has had some increased stress in the past but feels this is improved of recent.  They have now try to make the side of her bed protective to keep her from sleepwalking at night.  She did sprain her right ankle quite severely which is now in a boot and stubbed her left big toe last night from trying to get out of bed.  She is concerned about her safety in her sleep.  PCP gave her prescription for trazodone which she tried for 2 nights and it was ineffective.  She denies any cardiac or respiratory symptoms.    ROS: As per HPI and otherwise negative if not stated.    Past Medical History:   Diagnosis Date   • Anesthesia     Severe PONV   • Arthritis     TMJ   • Bronchitis    • Chickenpox    • Dental disorder     permanent retainer   • Eating disorder 7/10/2018   • Fatty liver    • GERD (gastroesophageal reflux disease)    •  Gestational diabetes    • Hemorrhoids    • Hiatus hernia syndrome    • Indigestion     GERD   • Influenza    • Neck pain    • Obesity    • Vitamin D deficiency        Past Surgical History:   Procedure Laterality Date   • LISBET BY LAPAROSCOPY  12/18/2012    Performed by Idania Camara M.D. at SURGERY [facility] ORS   • LARYNGOSCOPY  7/6/2011    Performed by EDNA MINA at SURGERY SAME DAY [facility] ORS   • ESOPHAGOSCOPY  7/6/2011    Performed by EDNA MINA at SURGERY SAME DAY [facility] ORS   • TONSILLECTOMY AND ADENOIDECTOMY  1991   • OTHER  1991    TONSILLECTOMY    • CHOLECYSTECTOMY         Family History   Problem Relation Age of Onset   • Arthritis Mother    • Hypertension Mother    • Hyperlipidemia Mother    • Sleep Apnea Mother    • Psychiatric Illness Father    • Alcohol abuse Father    • Thyroid Sister    • Heart Disease Maternal Grandmother    • Heart Disease Maternal Grandfather    • Heart Disease Paternal Grandmother    • Diabetes Paternal Grandmother    • Cancer Paternal Grandfather         brain   • Stroke Other        Social History     Socioeconomic History   • Marital status:      Spouse name: Not on file   • Number of children: Not on file   • Years of education: Not on file   • Highest education level: Not on file   Occupational History   • Not on file   Tobacco Use   • Smoking status: Never Smoker   • Smokeless tobacco: Never Used   Vaping Use   • Vaping Use: Never used   Substance and Sexual Activity   • Alcohol use: No   • Drug use: No   • Sexual activity: Yes     Partners: Male     Birth control/protection: Condom     Comment: , works at [employer]   Other Topics Concern   • Not on file   Social History Narrative   • Not on file     Social Determinants of Health     Financial Resource Strain:    • Difficulty of Paying Living Expenses:    Food Insecurity:    • Worried About Running Out of Food in the Last Year:    • Ran Out of Food in the Last Year:   "  Transportation Needs:    • Lack of Transportation (Medical):    • Lack of Transportation (Non-Medical):    Physical Activity:    • Days of Exercise per Week:    • Minutes of Exercise per Session:    Stress:    • Feeling of Stress :    Social Connections:    • Frequency of Communication with Friends and Family:    • Frequency of Social Gatherings with Friends and Family:    • Attends [employer] Services:    • Active Member of Clubs or Organizations:    • Attends Club or Organization Meetings:    • Marital Status:    Intimate Partner Violence:    • Fear of Current or Ex-Partner:    • Emotionally Abused:    • Physically Abused:    • Sexually Abused:        Allergies as of 08/23/2021 - Reviewed 08/23/2021   Allergen Reaction Noted   • Codeine Vomiting 01/02/2009   • Morphine Vomiting 06/28/2011   • Sulfa drugs Vomiting 06/28/2011   • Tape Contact Dermatitis 06/28/2011        Vitals:  /72 (BP Location: Left arm, Patient Position: Sitting, BP Cuff Size: Adult)   Pulse 99   Resp 16   Ht 1.575 m (5' 2\")   Wt 85.3 kg (188 lb)   SpO2 98%     Current medications as of today   Current Outpatient Medications   Medication Sig Dispense Refill   • traZODone (DESYREL) 100 MG Tab Take 0.5-1 Tablets by mouth at bedtime. 30 tablet 3   • Vitamin D, Cholecalciferol, (CHOLECALCIFEROL) 25 MCG (1000 UT) Tab Take  by mouth every day. 30 Tab 1   • Pseudoephedrine HCl (SUDAFED PO) Take  by mouth.     • calcium carbonate (TUMS) 500 MG Chew Tab Take 500 mg by mouth every day.     • ibuprofen (MOTRIN) 200 MG Tab Take 200 mg by mouth every 6 hours as needed.       No current facility-administered medications for this visit.         Physical Exam:   Gen:           Alert and oriented, No apparent distress. Mood and affect appropriate, normal interaction with examiner.  Eyes:          PERRL, EOM intact, sclere white, conjunctive moist.  Glasses  Ears:          Not examined.   Hearing:     Grossly intact.  Nose:          Normal, no lesions " or deformities.  Dentition:    Mask  Oropharynx:   Mask  Mallampati Classification: Mask  Neck:        Supple, trachea midline, no masses.  Respiratory Effort: No intercostal retractions or use of accessory muscles.   Lung Auscultation:      Clear to auscultation bilaterally; no rales, rhonchi or wheezing.  CV:            Regular rate and rhythm. No murmurs, rubs or gallops.  Abd:           Not examined.   Lymphadenopathy: Not examined.  Gait and Station: Normal.  Digits and Nails: No clubbing, cyanosis, petechiae, or nodes.   Cranial Nerves: II-XII grossly intact.  Skin:        No rashes, lesions or ulcers noted.               Ext:           No cyanosis or edema.      Assessment:  1. Central sleep apnea     2. Chronic insomnia     3. BMI 34.0-34.9,adult  Height And Weight   4. Nonsmoker         Immunizations:    Flu: Recommend in fall  Pneumovax 23: Not due  Prevnar 13: Not due  COVID-19: 1/11/2021, 12/21/2021.    Plan:  1.  Patient notes return of sleep symptoms including fatigue and most recent sleepwalking.  She would like to restart therapy.  She understands the benefit of therapy.  She is motivated to treat this well.  Restart BiPAP 15/11 cm.  Recall letter provided to patient she will register device.  She will obtain an inline bacteria filter to continue using her device.  DME mask/supplies  2.  Discussed sleep hygiene  3.  Follow-up primary care for the health concerns line 4.  Follow-up in 3 months to review compliance/sleep symptoms, sooner if needed.    Please note that this dictation was created using voice recognition software. I have made every reasonable attempt to correct obvious errors, but it is possible there are errors of grammar and possibly content that I did not discover before finalizing the note.

## 2021-08-24 RX ORDER — TRAZODONE HYDROCHLORIDE 100 MG/1
50-100 TABLET ORAL
Qty: 30 TABLET | Refills: 3 | OUTPATIENT
Start: 2021-08-24

## 2021-09-08 ENCOUNTER — OFFICE VISIT (OUTPATIENT)
Dept: MEDICAL GROUP | Facility: PHYSICIAN GROUP | Age: 41
End: 2021-09-08
Payer: COMMERCIAL

## 2021-09-08 VITALS
SYSTOLIC BLOOD PRESSURE: 118 MMHG | HEART RATE: 98 BPM | WEIGHT: 180 LBS | OXYGEN SATURATION: 96 % | TEMPERATURE: 98.3 F | HEIGHT: 62 IN | DIASTOLIC BLOOD PRESSURE: 66 MMHG | BODY MASS INDEX: 33.13 KG/M2

## 2021-09-08 DIAGNOSIS — M25.571 ACUTE RIGHT ANKLE PAIN: ICD-10-CM

## 2021-09-08 PROCEDURE — 99214 OFFICE O/P EST MOD 30 MIN: CPT | Performed by: FAMILY MEDICINE

## 2021-09-08 ASSESSMENT — FIBROSIS 4 INDEX: FIB4 SCORE: 0.53

## 2021-09-09 NOTE — PROGRESS NOTES
CC: Ankle injury    HISTORY OF THE PRESENT ILLNESS: Patient is a 41 y.o. female.     Patient was seen about 3 weeks ago on August 17, 2021 for a sprain of her right ankle.  During this episode, she was sleepwalking and notes that she felt her ankles roll and she fell backwards.  She is unsure of mechanism of injury, and whether or not it was an inversion or eversion sprain.  She was initially unable to bear weight on her right ankle.  When she was seen at urgent care she had a negative x-ray and was put in a walking boot for up to 6 to 8 weeks.  She was initially given crutches and told to wean off after 4 to 5 days and increase weightbearing as tolerated.    Unfortunately ankle is still quite painful and swollen.  She has been ambulating in her walking boot.  Due to her work it is difficult to keep leg elevated throughout the day.    Allergies: Codeine, Morphine, Sulfa drugs, and Tape    No current Epic-ordered outpatient medications on file.     No current [facility]-ordered facility-administered medications on file.       Past Medical History:   Diagnosis Date   • Anesthesia     Severe PONV   • Arthritis     TMJ   • Bronchitis    • Chickenpox    • Dental disorder     permanent retainer   • Eating disorder 7/10/2018   • Fatty liver    • GERD (gastroesophageal reflux disease)    • Gestational diabetes    • Hemorrhoids    • Hiatus hernia syndrome    • Indigestion     GERD   • Influenza    • Neck pain    • Obesity    • Vitamin D deficiency        Past Surgical History:   Procedure Laterality Date   • LISBET BY LAPAROSCOPY  12/18/2012    Performed by Idania Camara M.D. at SURGERY [facility] ORS   • LARYNGOSCOPY  7/6/2011    Performed by EDNA MINA at SURGERY SAME DAY [facility] ORS   • ESOPHAGOSCOPY  7/6/2011    Performed by EDNA MINA at SURGERY SAME DAY [facility] ORS   • TONSILLECTOMY AND ADENOIDECTOMY  1991   • OTHER  1991    TONSILLECTOMY    • CHOLECYSTECTOMY         Social History     Tobacco Use   •  "Smoking status: Never Smoker   • Smokeless tobacco: Never Used   Vaping Use   • Vaping Use: Never used   Substance Use Topics   • Alcohol use: No   • Drug use: No       Social History     Social History Narrative   • Not on file       Family History   Problem Relation Age of Onset   • Arthritis Mother    • Hypertension Mother    • Hyperlipidemia Mother    • Sleep Apnea Mother    • Psychiatric Illness Father    • Alcohol abuse Father    • Thyroid Sister    • Heart Disease Maternal Grandmother    • Heart Disease Maternal Grandfather    • Heart Disease Paternal Grandmother    • Diabetes Paternal Grandmother    • Cancer Paternal Grandfather         brain   • Stroke Other      Imaging: Ankle x-ray reviewed from August 17, 2021.    Exam: /66 (BP Location: Right arm, Patient Position: Sitting, BP Cuff Size: Adult)   Pulse 98   Temp 36.8 °C (98.3 °F) (Temporal)   Ht 1.575 m (5' 2\")   Wt 81.6 kg (180 lb)   SpO2 96%  Body mass index is 32.92 kg/m².    General: Well appearing, NAD  Musculoskeletal: She has appreciable soft tissue swelling in the lateral ankle and tenderness along both the medial and lateral malleoli, she has some minimal residual ecchymosis noted on lateral dorsum of foot.  Psych: Normal mood and affect. Alert and oriented. Judgment and insight is normal.    Please note that this dictation was created using voice recognition software. I have made every reasonable attempt to correct obvious errors, but I expect that there are errors of grammar and possibly content that I did not discover before finalizing the note.      Assessment/Plan  Lily was seen today for follow-up.    Diagnoses and all orders for this visit:    Acute right ankle pain  This is an acute issue for the patient.  She had a negative x-ray.  Exam and symptoms are consistent with likely inversion sprain which we discussed can take 6 to 8 weeks to heal.  Typically though, I would recommend physical therapy right away for early " mobilization of the ankle and for proper rehab of the ankle, or else she may be more at risk in the future for additional ankle injuries, instability or pain.  I have referred her to physical therapy as such.  Patient would feel reassured by MRI to assess the remainder of the ankle.  X-ray could have missed hairline fracture or injury to the talar dome.  Given unknown mechanism of injury, I think MRI is reasonable at this point as well.  She was given a handout for ankle mobilization exercises to begin prior to the start of physical therapy.  -     REFERRAL TO PHYSICAL THERAPY  -     MR-ANKLE W/O RIGHT; Future      Follow-up if symptoms not improving.    Maria Victoria Kam, DO  [facility]

## 2021-09-14 ENCOUNTER — HOSPITAL ENCOUNTER (OUTPATIENT)
Dept: RADIOLOGY | Facility: MEDICAL CENTER | Age: 41
End: 2021-09-14
Attending: FAMILY MEDICINE
Payer: COMMERCIAL

## 2021-09-14 DIAGNOSIS — S82.399S: ICD-10-CM

## 2021-09-14 DIAGNOSIS — M25.571 ACUTE RIGHT ANKLE PAIN: ICD-10-CM

## 2021-09-14 PROCEDURE — 73721 MRI JNT OF LWR EXTRE W/O DYE: CPT | Mod: RT

## 2021-09-20 ENCOUNTER — OFFICE VISIT (OUTPATIENT)
Dept: MEDICAL GROUP | Facility: CLINIC | Age: 41
End: 2021-09-20
Payer: COMMERCIAL

## 2021-09-20 VITALS
OXYGEN SATURATION: 100 % | DIASTOLIC BLOOD PRESSURE: 74 MMHG | RESPIRATION RATE: 16 BRPM | SYSTOLIC BLOOD PRESSURE: 116 MMHG | HEIGHT: 62 IN | BODY MASS INDEX: 33.13 KG/M2 | WEIGHT: 180 LBS | TEMPERATURE: 97.9 F | HEART RATE: 92 BPM

## 2021-09-20 DIAGNOSIS — S82.391G: ICD-10-CM

## 2021-09-20 PROCEDURE — 99213 OFFICE O/P EST LOW 20 MIN: CPT | Performed by: FAMILY MEDICINE

## 2021-09-20 ASSESSMENT — ENCOUNTER SYMPTOMS
FEVER: 0
DIZZINESS: 0
NAUSEA: 0
VOMITING: 0
CHILLS: 0
SHORTNESS OF BREATH: 0

## 2021-09-20 ASSESSMENT — FIBROSIS 4 INDEX: FIB4 SCORE: 0.53

## 2021-09-20 NOTE — PROGRESS NOTES
Subjective     Lily Vasquez is a 41 y.o. female who presents with Ankle Injury (Referral from PCP/ R ankle injury )     Referred by Maria Victoria Kam D.O.  for evaluation of RIGHT ankle pain    HPI   RIGHT ankle pain  DOI, 8/17/2021, early AM hours  Sleepwalking, woke falling backwards felt popping in the ankle as she fell  Sudden pain entire ankle, both legs  Difficulty bearing wt  Particularly on the RIGHT  Began swelling afterwards, particularly the RIGHT ankle  It is predominantly along the RIGHT  entire ankle, posterior lateral leg  Improved with rest immobilization  Worse with movement, weightbearing, and POSITIVE symptoms at night due to RLS which caused her to wake with pain, particularly in the posterior ankle  Ibuprofen for pain which helps some  History sprained ankle at age 12, unsure if it was her right    Desk work, works from home, [place] [employer]  Walking daughters to and from school, likes walking, active around the house    Review of Systems   Constitutional: Negative for chills and fever.   Respiratory: Negative for shortness of breath.    Cardiovascular: Negative for chest pain.   Gastrointestinal: Negative for nausea and vomiting.   Neurological: Negative for dizziness.     PMH:  has a past medical history of Anesthesia, Arthritis, Bronchitis, Chickenpox, Dental disorder, Eating disorder (7/10/2018), Fatty liver, GERD (gastroesophageal reflux disease), Gestational diabetes, Hemorrhoids, Hiatus hernia syndrome, Indigestion, Influenza, Neck pain, Obesity, and Vitamin D deficiency. She also has no past medical history of CAD (coronary artery disease), COPD, Liver disease, or Seizure disorder (HCC).  MEDS: No current outpatient medications on file.  ALLERGIES:   Allergies   Allergen Reactions   • Codeine Vomiting     abd pain   • Morphine Vomiting   • Sulfa Drugs Vomiting     abd pain   • Tape Contact Dermatitis     SURGHX:   Past Surgical History:   Procedure Laterality Date   • LISBET BY  "LAPAROSCOPY  12/18/2012    Performed by Idania Camara M.D. at SURGERY [facility]   • LARYNGOSCOPY  7/6/2011    Performed by EDNA MINA at SURGERY SAME DAY [facility]S   • ESOPHAGOSCOPY  7/6/2011    Performed by EDNA MINA at SURGERY SAME DAY [facility]   • TONSILLECTOMY AND ADENOIDECTOMY  1991   • OTHER  1991    TONSILLECTOMY    • CHOLECYSTECTOMY       SOCHX:  reports that she has never smoked. She has never used smokeless tobacco. She reports that she does not drink alcohol and does not use drugs.  FH: Family history was reviewed, no pertinent findings to report    Objective     /74 (BP Location: Right arm, Patient Position: Sitting, BP Cuff Size: Adult)   Pulse 92   Temp 36.6 °C (97.9 °F) (Temporal)   Resp 16   Ht 1.575 m (5' 2\")   Wt 81.6 kg (180 lb)   SpO2 100%   BMI 32.92 kg/m²      Physical Exam       RIGHT ANKLE:  There is NO swelling noted at the ankle  Range of motion intact with dorsiflexion and plantarflexion, inversion and eversion  There is NO tenderness of the ATFL, CF or PTF ligament  There is NO tenderness of the lateral malleolus or medial malleolus  Anterior drawer testing is NEGATIVE  Talar tilt testing is NEGATIVE  The foot and ankle is otherwise neurovascularly intact    RIGHT FOOT:  There is NO swelling noted at the foot  There is NO tenderness at the base of the fifth metatarsal, cuboid, or tarsal navicular  There is NO pain with metatarsal squeeze test    LEFT ANKLE:  There is NO swelling noted at the ankle  Range of motion intact with dorsiflexion and plantarflexion, inversion and eversion  There is NO tenderness of the ATFL, CF or PTF ligament  There is NO tenderness of the lateral malleolus or medial malleolus  Anterior drawer testing is NEGATIVE  Talar tilt testing is NEGATIVE  The foot and ankle is otherwise neurovascularly intact    LEFT FOOT:  There is NO swelling noted at the foot  There is NO tenderness at the base of the fifth metatarsal, cuboid, " or tarsal navicular  There is NO pain with metatarsal squeeze test    NEUTRAL stance  Able to ambulate with Antalgic gait, partial weightbearing with crutches and short cam walker boot           Assessment & Plan        1. Closed fracture of posterior malleolus with delayed healing, right        DOI, 8/17/2021, early AM hours  Sleepwalking, woke falling backwards felt popping in the ankle as she fell  Sudden pain entire ankle, both legs    Late diagnosis of nondisplaced tibial distal fracture  Since this is a fracture that is typically treated nonweightbearing this is why patient is having difficulty recovering from this injury.    We offered cast immobilization and recommended nonweightbearing status  Patient has elected to proceed with CAM Walker boot instead.  She is currently in a short leg Cam walker boot we have recommended switching her to a tall leg Cam walker boot for additional support/protection  She has been instructed to avoid weightbearing and should not exceed toe-touch for ambulation    Return in about 2 weeks (around 10/4/2021).  For reevaluation at that time.  Suspect she will need an additional week of nonweightbearing and then transition to weightbearing as tolerated after that                  9/14/2021 10:20 AM     HISTORY/REASON FOR EXAM:  Ankle pain, neg xray  Acute right ankle pain after falling from sleep walking x 4 weeks     TECHNIQUE/EXAM DESCRIPTION:  MRI of the RIGHT ankle without contrast.     The study was performed on a [employer] Signa 1.5 Analilia MRI scanner. T1 axial and sagittal, fast spin-echo T2 fat-suppressed axial and coronal, and fast inversion recovery sagittal images were obtained.     COMPARISON: Radiograph 8/17/2021.     FINDINGS:     Mild subcutaneous edema throughout the ankle.     ANTERIOR:  Tibialis anterior tendon: Intact.  Extensor hallucis longus: Intact.  Extensor digitorum longus tendons: Intact.     MEDIAL:  Tibialis posterior tendon: Intact.  Flexor digitorum longus:  Intact.  Flexor hallucis longus: Intact.     The deltoid ligament is intact.  Spring ligament: Intact.     LATERAL:  Peroneus longus tendon: Intact.  Peroneus brevis tendon: Intact.     Anterior talofibular: Intact.  Posterior talofibular: Intact.  Calcaneofibular: Intact.  Distal tibia-fibula syndesmosis and ligaments: Intact.     POSTERIOR:  Achilles tendon is normal. There is no fluid within the retrocalcaneal bursa.     JOINT:  Tibiotalar joint: Moderate effusion. There is physiologic joint fluid without intra-articular bodies noted.  Subtalar joint: Normal.     BONE: Nondisplaced fracture of the posterior malleolus with bone marrow edema     PLANTAR FASCIA: Intact.     __________________________________     IMPRESSION:        1. Nondisplaced fracture of the posterior malleolus with bone marrow edema. There is intra-articular extension with moderate tibiotalar joint effusion.  2. No tendon or ligamentous injury.        Exam Ended: 09/14/21 11:04 AM Last Resulted: 09/14/21  1:28 PM              8/17/2021 8:44 AM     HISTORY/REASON FOR EXAM:  Pain/Deformity Following Trauma        TECHNIQUE/EXAM DESCRIPTION AND NUMBER OF VIEWS:  3 views of the RIGHT ankle.     COMPARISON: None     FINDINGS:  There is no evidence of fracture or dislocation.  The ankle mortise is well-maintained. The talar dome is preserved.  There is soft tissue swelling overlying the ankle. There is spurring of the calcaneus at the insertion of the Achilles tendon and   plantar fascia.        IMPRESSION:     1.  No evidence of fracture or dislocation.  2.  Calcaneal spurring.  3.  Mild soft tissue swelling.        Exam Ended: 08/17/21  8:55 AM Last Resulted: 08/17/21  8:58 AM           Interpreted in the office today with the patient    Thank you Maria Victoria Kam D.O. for allowing me to participate in caring for your patient.

## 2021-09-20 NOTE — Clinical Note
"Hi AJ,  Just do not give you follow-up on this patient.  Apparently, x-rays were \"normal\" (I agree after reviewing them myself).  But she did have a occult posterior tibial plateau fracture and significant distal tibial edema on MRI study ordered by Dr. Kam.  We have recommended nonweightbearing for a few weeks and we will see her back.  She should do well.  Hope you are well!  L"

## 2021-09-20 NOTE — Clinical Note
Angel Irene,   Thank you for referring Lily to our sports medicine clinic.  Since her fracture is typically managed nonweightbearing and it was not diagnosed until after the MRI was done, we have recommended nonweightbearing for a few weeks and we will see her back to see how she is coming along.  Hope you are well!  L

## 2021-09-22 ENCOUNTER — OFFICE VISIT (OUTPATIENT)
Dept: URGENT CARE | Facility: PHYSICIAN GROUP | Age: 41
End: 2021-09-22
Payer: COMMERCIAL

## 2021-09-22 ENCOUNTER — HOSPITAL ENCOUNTER (OUTPATIENT)
Facility: MEDICAL CENTER | Age: 41
End: 2021-09-22
Attending: NURSE PRACTITIONER
Payer: COMMERCIAL

## 2021-09-22 VITALS
BODY MASS INDEX: 34.96 KG/M2 | OXYGEN SATURATION: 97 % | WEIGHT: 190 LBS | RESPIRATION RATE: 20 BRPM | HEART RATE: 107 BPM | SYSTOLIC BLOOD PRESSURE: 112 MMHG | DIASTOLIC BLOOD PRESSURE: 64 MMHG | HEIGHT: 62 IN | TEMPERATURE: 99.6 F

## 2021-09-22 DIAGNOSIS — U07.1 COVID-19: ICD-10-CM

## 2021-09-22 DIAGNOSIS — B97.89 VIRAL RESPIRATORY ILLNESS: ICD-10-CM

## 2021-09-22 DIAGNOSIS — Z20.822 CLOSE EXPOSURE TO COVID-19 VIRUS: ICD-10-CM

## 2021-09-22 DIAGNOSIS — J98.8 VIRAL RESPIRATORY ILLNESS: ICD-10-CM

## 2021-09-22 PROCEDURE — U0005 INFEC AGEN DETEC AMPLI PROBE: HCPCS

## 2021-09-22 PROCEDURE — U0003 INFECTIOUS AGENT DETECTION BY NUCLEIC ACID (DNA OR RNA); SEVERE ACUTE RESPIRATORY SYNDROME CORONAVIRUS 2 (SARS-COV-2) (CORONAVIRUS DISEASE [COVID-19]), AMPLIFIED PROBE TECHNIQUE, MAKING USE OF HIGH THROUGHPUT TECHNOLOGIES AS DESCRIBED BY CMS-2020-01-R: HCPCS

## 2021-09-22 PROCEDURE — 99213 OFFICE O/P EST LOW 20 MIN: CPT | Mod: CS | Performed by: NURSE PRACTITIONER

## 2021-09-22 RX ORDER — ALBUTEROL SULFATE 90 UG/1
1-2 AEROSOL, METERED RESPIRATORY (INHALATION) EVERY 6 HOURS PRN
Qty: 8.5 G | Refills: 0 | Status: SHIPPED | OUTPATIENT
Start: 2021-09-22 | End: 2022-02-13

## 2021-09-22 ASSESSMENT — ENCOUNTER SYMPTOMS
DIARRHEA: 1
WHEEZING: 0
HEMOPTYSIS: 0
SHORTNESS OF BREATH: 0
CHILLS: 1
FEVER: 0
HEADACHES: 1
SORE THROAT: 1
NAUSEA: 1
VOMITING: 0
DIAPHORESIS: 1
COUGH: 1

## 2021-09-22 ASSESSMENT — FIBROSIS 4 INDEX: FIB4 SCORE: 0.53

## 2021-09-22 NOTE — PROGRESS NOTES
Subjective:     Lily Vasquez is a 41 y.o. female who presents for Coronavirus Screening (started monday evening, cough, sore throat, congestion, headache, exposure to covid from  who tested positive today.)      Her  tested positive for covid today. No hx of COVID. She is vaccinated. Her symptoms started on Monday. Taking Ibuprofen for headaches.     Cough  This is a new problem. The current episode started in the past 7 days. The problem has been gradually worsening. The cough is non-productive. Associated symptoms include chills, headaches, nasal congestion and a sore throat. Pertinent negatives include no fever, hemoptysis, shortness of breath or wheezing. Nothing aggravates the symptoms. Her past medical history is significant for bronchitis.       Past Medical History:   Diagnosis Date   • Anesthesia     Severe PONV   • Arthritis     TMJ   • Bronchitis    • Chickenpox    • Dental disorder     permanent retainer   • Eating disorder 7/10/2018   • Fatty liver    • GERD (gastroesophageal reflux disease)    • Gestational diabetes    • Hemorrhoids    • Hiatus hernia syndrome    • Indigestion     GERD   • Influenza    • Neck pain    • Obesity    • Vitamin D deficiency        Past Surgical History:   Procedure Laterality Date   • LISBET BY LAPAROSCOPY  12/18/2012    Performed by Idania Camara M.D. at SURGERY [facility]   • LARYNGOSCOPY  7/6/2011    Performed by EDNA MINA at SURGERY SAME DAY [facility]S   • ESOPHAGOSCOPY  7/6/2011    Performed by EDNA MINA at SURGERY SAME DAY [facility]   • TONSILLECTOMY AND ADENOIDECTOMY  1991   • OTHER  1991    TONSILLECTOMY    • CHOLECYSTECTOMY         Social History     Socioeconomic History   • Marital status:      Spouse name: Not on file   • Number of children: Not on file   • Years of education: Not on file   • Highest education level: Not on file   Occupational History   • Not on file   Tobacco Use   • Smoking status: Never  Smoker   • Smokeless tobacco: Never Used   Vaping Use   • Vaping Use: Never used   Substance and Sexual Activity   • Alcohol use: No   • Drug use: No   • Sexual activity: Yes     Partners: Male     Birth control/protection: Condom     Comment: , works at [employer]   Other Topics Concern   • Not on file   Social History Narrative   • Not on file     Social Determinants of Health     Financial Resource Strain:    • Difficulty of Paying Living Expenses:    Food Insecurity:    • Worried About Running Out of Food in the Last Year:    • Ran Out of Food in the Last Year:    Transportation Needs:    • Lack of Transportation (Medical):    • Lack of Transportation (Non-Medical):    Physical Activity:    • Days of Exercise per Week:    • Minutes of Exercise per Session:    Stress:    • Feeling of Stress :    Social Connections:    • Frequency of Communication with Friends and Family:    • Frequency of Social Gatherings with Friends and Family:    • Attends [employer] Services:    • Active Member of Clubs or Organizations:    • Attends Club or Organization Meetings:    • Marital Status:    Intimate Partner Violence:    • Fear of Current or Ex-Partner:    • Emotionally Abused:    • Physically Abused:    • Sexually Abused:         Family History   Problem Relation Age of Onset   • Arthritis Mother    • Hypertension Mother    • Hyperlipidemia Mother    • Sleep Apnea Mother    • Psychiatric Illness Father    • Alcohol abuse Father    • Thyroid Sister    • Heart Disease Maternal Grandmother    • Heart Disease Maternal Grandfather    • Heart Disease Paternal Grandmother    • Diabetes Paternal Grandmother    • Cancer Paternal Grandfather         brain   • Stroke Other         Allergies   Allergen Reactions   • Codeine Vomiting     abd pain   • Morphine Vomiting   • Sulfa Drugs Vomiting     abd pain   • Tape Contact Dermatitis       Review of Systems   Constitutional: Positive for chills, diaphoresis and malaise/fatigue.  "Negative for fever.   HENT: Positive for congestion and sore throat.    Respiratory: Positive for cough. Negative for hemoptysis, shortness of breath and wheezing.    Gastrointestinal: Positive for diarrhea and nausea. Negative for vomiting.   Neurological: Positive for headaches.   All other systems reviewed and are negative.       Objective:   /64 (BP Location: Right arm, Patient Position: Sitting, BP Cuff Size: Adult)   Pulse (!) 107   Temp 37.6 °C (99.6 °F) (Temporal)   Resp 20   Ht 1.575 m (5' 2\")   Wt 86.2 kg (190 lb)   SpO2 97%   BMI 34.75 kg/m²     Physical Exam  Vitals reviewed.   Constitutional:       General: She is not in acute distress.     Appearance: She is well-developed. She is not toxic-appearing.   HENT:      Head: Normocephalic and atraumatic.      Right Ear: Tympanic membrane, ear canal and external ear normal.      Left Ear: Tympanic membrane, ear canal and external ear normal.      Nose: Mucosal edema present.      Mouth/Throat:      Lips: Pink.      Mouth: Mucous membranes are moist.      Pharynx: Posterior oropharyngeal erythema present.   Eyes:      Conjunctiva/sclera: Conjunctivae normal.   Cardiovascular:      Rate and Rhythm: Normal rate.   Pulmonary:      Effort: Pulmonary effort is normal. No accessory muscle usage, prolonged expiration, respiratory distress or retractions.      Breath sounds: Normal breath sounds. No stridor. No wheezing, rhonchi or rales.   Musculoskeletal:         General: Normal range of motion.      Cervical back: Normal range of motion and neck supple.   Skin:     General: Skin is warm and dry.      Findings: No rash.   Neurological:      Mental Status: She is alert and oriented to person, place, and time.      GCS: GCS eye subscore is 4. GCS verbal subscore is 5. GCS motor subscore is 6.   Psychiatric:         Mood and Affect: Mood normal.         Speech: Speech normal.         Behavior: Behavior normal.         Thought Content: Thought content " normal.         Judgment: Judgment normal.         Assessment/Plan:   1. Viral respiratory illness  - SARS-CoV-2 PCR (24 hour In-House): Collect NP swab in VTM; Future  - albuterol 108 (90 Base) MCG/ACT Aero Soln inhalation aerosol; Inhale 1-2 Puffs every 6 hours as needed for Shortness of Breath.  Dispense: 8.5 g; Refill: 0    2. Close exposure to COVID-19 virus  - SARS-CoV-2 PCR (24 hour In-House): Collect NP swab in VTM; Future  Symptomatic care.  -Oral hydration and rest.   -Cough control: nonpharmacologic options for cough relief such as throat lozenges, hot tea, honey.  -Over the counter expectorant as directed; Guaifenesin (Mucinex).  -Tylenol or ibuprofen for pain and fever as directed.     Seek emergency medical care immediately for: Trouble breathing, persistent pain or pressure in the chest, confusion, inability to wake or stay awake, bluish lips or face, persistent tachycardia (fast heart rate), prolonged dizziness, persistent high grade fevers. Follow up for prolonged cough, persistent wheezing, leg swelling, or any other concerns. Follow up with PCP.     -Discussed viral etiology. COVID S&S, and self isolation guidelines. S&S of PNA with follow up.     Differential diagnosis, natural history, supportive care, and indications for immediate follow-up discussed.

## 2021-09-22 NOTE — PATIENT INSTRUCTIONS
Symptomatic care.  -Oral hydration and rest.   -Cough control: nonpharmacologic options for cough relief such as throat lozenges, hot tea, honey.  -Over the counter expectorant as directed; Guaifenesin (Mucinex).  -Tylenol or ibuprofen for pain and fever as directed.     Seek emergency medical care immediately for: Trouble breathing, persistent pain or pressure in the chest, confusion, inability to wake or stay awake, bluish lips or face, persistent tachycardia (fast heart rate), prolonged dizziness, persistent high grade fevers. Follow up for prolonged cough, persistent wheezing, leg swelling, or any other concerns. Follow up with PCP.      COVID-19  COVID-19 is a respiratory infection that is caused by a virus called severe acute respiratory syndrome coronavirus 2 (SARS-CoV-2). The disease is also known as coronavirus disease or novel coronavirus. In some people, the virus may not cause any symptoms. In others, it may cause a serious infection. The infection can get worse quickly and can lead to complications, such as:  · Pneumonia, or infection of the lungs.  · Acute respiratory distress syndrome or ARDS. This is fluid build-up in the lungs.  · Acute respiratory failure. This is a condition in which there is not enough oxygen passing from the lungs to the body.  · Sepsis or septic shock. This is a serious bodily reaction to an infection.  · Blood clotting problems.  · Secondary infections due to bacteria or fungus.  The virus that causes COVID-19 is contagious. This means that it can spread from person to person through droplets from coughs and sneezes (respiratory secretions).  What are the causes?  This illness is caused by a virus. You may catch the virus by:  · Breathing in droplets from an infected person's cough or sneeze.  · Touching something, like a table or a doorknob, that was exposed to the virus (contaminated) and then touching your mouth, nose, or eyes.  What increases the risk?  Risk for  infection  You are more likely to be infected with this virus if you:  · Live in or travel to an area with a COVID-19 outbreak.  · Come in contact with a sick person who recently traveled to an area with a COVID-19 outbreak.  · Provide care for or live with a person who is infected with COVID-19.  Risk for serious illness  You are more likely to become seriously ill from the virus if you:  · Are 65 years of age or older.  · Have a long-term disease that lowers your body's ability to fight infection (immunocompromised).  · Live in a nursing home or long-term care facility.  · Have a long-term (chronic) disease such as:  ? Chronic lung disease, including chronic obstructive pulmonary disease or asthma  ? Heart disease.  ? Diabetes.  ? Chronic kidney disease.  ? Liver disease.  · Are obese.  What are the signs or symptoms?  Symptoms of this condition can range from mild to severe. Symptoms may appear any time from 2 to 14 days after being exposed to the virus. They include:  · A fever.  · A cough.  · Difficulty breathing.  · Chills.  · Muscle pains.  · A sore throat.  · Loss of taste or smell.  Some people may also have stomach problems, such as nausea, vomiting, or diarrhea.  Other people may not have any symptoms of COVID-19.  How is this diagnosed?  This condition may be diagnosed based on:  · Your signs and symptoms, especially if:  ? You live in an area with a COVID-19 outbreak.  ? You recently traveled to or from an area where the virus is common.  ? You provide care for or live with a person who was diagnosed with COVID-19.  · A physical exam.  · Lab tests, which may include:  ? A nasal swab to take a sample of fluid from your nose.  ? A throat swab to take a sample of fluid from your throat.  ? A sample of mucus from your lungs (sputum).  ? Blood tests.  · Imaging tests, which may include, X-rays, CT scan, or ultrasound.  How is this treated?  At present, there is no medicine to treat COVID-19. Medicines that  treat other diseases are being used on a trial basis to see if they are effective against COVID-19.  Your health care provider will talk with you about ways to treat your symptoms. For most people, the infection is mild and can be managed at home with rest, fluids, and over-the-counter medicines.  Treatment for a serious infection usually takes places in a hospital intensive care unit (ICU). It may include one or more of the following treatments. These treatments are given until your symptoms improve.  · Receiving fluids and medicines through an IV.  · Supplemental oxygen. Extra oxygen is given through a tube in the nose, a face mask, or a victoria.  · Positioning you to lie on your stomach (prone position). This makes it easier for oxygen to get into the lungs.  · Continuous positive airway pressure (CPAP) or bi-level positive airway pressure (BPAP) machine. This treatment uses mild air pressure to keep the airways open. A tube that is connected to a motor delivers oxygen to the body.  · Ventilator. This treatment moves air into and out of the lungs by using a tube that is placed in your windpipe.  · Tracheostomy. This is a procedure to create a hole in the neck so that a breathing tube can be inserted.  · Extracorporeal membrane oxygenation (ECMO). This procedure gives the lungs a chance to recover by taking over the functions of the heart and lungs. It supplies oxygen to the body and removes carbon dioxide.  Follow these instructions at home:  Lifestyle  · If you are sick, stay home except to get medical care. Your health care provider will tell you how long to stay home. Call your health care provider before you go for medical care.  · Rest at home as told by your health care provider.  · Do not use any products that contain nicotine or tobacco, such as cigarettes, e-cigarettes, and chewing tobacco. If you need help quitting, ask your health care provider.  · Return to your normal activities as told by your health  care provider. Ask your health care provider what activities are safe for you.  General instructions  · Take over-the-counter and prescription medicines only as told by your health care provider.  · Drink enough fluid to keep your urine pale yellow.  · Keep all follow-up visits as told by your health care provider. This is important.  How is this prevented?    There is no vaccine to help prevent COVID-19 infection. However, there are steps you can take to protect yourself and others from this virus.  To protect yourself:   · Do not travel to areas where COVID-19 is a risk. The areas where COVID-19 is reported change often. To identify high-risk areas and travel restrictions, check the CDC travel website: wwwnc.cdc.gov/travel/notices  · If you live in, or must travel to, an area where COVID-19 is a risk, take precautions to avoid infection.  ? Stay away from people who are sick.  ? Wash your hands often with soap and water for 20 seconds. If soap and water are not available, use an alcohol-based hand .  ? Avoid touching your mouth, face, eyes, or nose.  ? Avoid going out in public, follow guidance from your state and local health authorities.  ? If you must go out in public, wear a cloth face covering or face mask.  ? Disinfect objects and surfaces that are frequently touched every day. This may include:  § Counters and tables.  § Doorknobs and light switches.  § Sinks and faucets.  § Electronics, such as phones, remote controls, keyboards, computers, and tablets.  To protect others:  If you have symptoms of COVID-19, take steps to prevent the virus from spreading to others.  · If you think you have a COVID-19 infection, contact your health care provider right away. Tell your health care team that you think you may have a COVID-19 infection.  · Stay home. Leave your house only to seek medical care. Do not use public transport.  · Do not travel while you are sick.  · Wash your hands often with soap and water  for 20 seconds. If soap and water are not available, use alcohol-based hand .  · Stay away from other members of your household. Let healthy household members care for children and pets, if possible. If you have to care for children or pets, wash your hands often and wear a mask. If possible, stay in your own room, separate from others. Use a different bathroom.  · Make sure that all people in your household wash their hands well and often.  · Cough or sneeze into a tissue or your sleeve or elbow. Do not cough or sneeze into your hand or into the air.  · Wear a cloth face covering or face mask.  Where to find more information  · Centers for Disease Control and Prevention: www.cdc.gov/coronavirus/2019-ncov/index.html  · World Health Organization: www.who.int/health-topics/coronavirus  Contact a health care provider if:  · You live in or have traveled to an area where COVID-19 is a risk and you have symptoms of the infection.  · You have had contact with someone who has COVID-19 and you have symptoms of the infection.  Get help right away if:  · You have trouble breathing.  · You have pain or pressure in your chest.  · You have confusion.  · You have bluish lips and fingernails.  · You have difficulty waking from sleep.  · You have symptoms that get worse.  These symptoms may represent a serious problem that is an emergency. Do not wait to see if the symptoms will go away. Get medical help right away. Call your local emergency services (911 in the [place] Do not drive yourself to the hospital. Let the emergency medical personnel know if you think you have COVID-19.  Summary  · COVID-19 is a respiratory infection that is caused by a virus. It is also known as coronavirus disease or novel coronavirus. It can cause serious infections, such as pneumonia, acute respiratory distress syndrome, acute respiratory failure, or sepsis.  · The virus that causes COVID-19 is contagious. This means that it can spread from  person to person through droplets from coughs and sneezes.  · You are more likely to develop a serious illness if you are 65 years of age or older, have a weak immunity, live in a nursing home, or have chronic disease.  · There is no medicine to treat COVID-19. Your health care provider will talk with you about ways to treat your symptoms.  · Take steps to protect yourself and others from infection. Wash your hands often and disinfect objects and surfaces that are frequently touched every day. Stay away from people who are sick and wear a mask if you are sick.  This information is not intended to replace advice given to you by your health care provider. Make sure you discuss any questions you have with your health care provider.  Document Released: 01/23/2020 Document Revised: 05/14/2020 Document Reviewed: 01/23/2020  [employer] Patient Education © 2020 [employer]

## 2021-09-23 DIAGNOSIS — Z20.822 CLOSE EXPOSURE TO COVID-19 VIRUS: ICD-10-CM

## 2021-09-23 DIAGNOSIS — J98.8 VIRAL RESPIRATORY ILLNESS: ICD-10-CM

## 2021-09-23 DIAGNOSIS — B97.89 VIRAL RESPIRATORY ILLNESS: ICD-10-CM

## 2021-09-23 LAB — COVID ORDER STATUS COVID19: NORMAL

## 2021-09-24 LAB
SARS-COV-2 RNA RESP QL NAA+PROBE: DETECTED
SPECIMEN SOURCE: ABNORMAL

## 2021-10-08 ENCOUNTER — OFFICE VISIT (OUTPATIENT)
Dept: SPORTS MEDICINE | Facility: CLINIC | Age: 41
End: 2021-10-08
Payer: COMMERCIAL

## 2021-10-08 VITALS
BODY MASS INDEX: 34.96 KG/M2 | HEIGHT: 62 IN | TEMPERATURE: 98.3 F | HEART RATE: 86 BPM | OXYGEN SATURATION: 96 % | SYSTOLIC BLOOD PRESSURE: 118 MMHG | WEIGHT: 190 LBS | RESPIRATION RATE: 18 BRPM | DIASTOLIC BLOOD PRESSURE: 80 MMHG

## 2021-10-08 DIAGNOSIS — S82.391G: ICD-10-CM

## 2021-10-08 PROCEDURE — 99213 OFFICE O/P EST LOW 20 MIN: CPT | Performed by: FAMILY MEDICINE

## 2021-10-08 ASSESSMENT — FIBROSIS 4 INDEX: FIB4 SCORE: 0.53

## 2021-10-08 NOTE — Clinical Note
Angel Irene,  We saw Lily back in follow-up for her ankle injury.  Fortunately, nonweightbearing is helping reduce her pain/symptoms as expected.  She will be nonweightbearing until early next week and then start partial weightbearing.  We will plan on seeing her in about 10 days to see how she is doing with the transition.  Hope you are well!  L

## 2021-10-08 NOTE — PROGRESS NOTES
"Subjective     Lily Vasquez is a 41 y.o. female who presents with Ankle Injury (Referral from PCP/ R ankle injury )     Referred by Maria Victoria Kam D.O.  for evaluation of RIGHT ankle pain    HPI   RIGHT ankle pain  DOI, 8/17/2021, early AM hours  Sleepwalking, woke falling backwards felt popping in the ankle as she fell  Sudden pain entire ankle, both legs  Difficulty bearing wt  Particularly on the RIGHT  Began swelling afterwards, particularly the RIGHT ankle  It is predominantly along the RIGHT  entire ankle, posterior lateral leg  Improved with rest immobilization  Worse with movement, weightbearing, and POSITIVE symptoms at night due to RLS which caused her to wake with pain, particularly in the posterior ankle    Overall she is feeling MUCH better nonweightbearing  Pain and swelling have improved    Desk work, works from home, [employer]  Walking daughters to and from school, likes walking, active around the house    Objective     /80 (BP Location: Left arm, Patient Position: Sitting, BP Cuff Size: Adult)   Pulse 86   Temp 36.8 °C (98.3 °F) (Temporal)   Resp 18   Ht 1.575 m (5' 2\")   Wt 86.2 kg (190 lb)   SpO2 96%   BMI 34.75 kg/m²       Physical Exam       RIGHT ANKLE:  There is NO swelling noted at the ankle  Range of motion intact with dorsiflexion and plantarflexion, inversion and eversion  There is NO tenderness of the ATFL, CF or PTF ligament  There is NO tenderness of the lateral malleolus or medial malleolus  Moderate soft tissue tenderness at the lateral ankle with some moderate tenderness at the posterior malleolus medially as well  Anterior drawer testing is NEGATIVE  Talar tilt testing is NEGATIVE  The foot and ankle is otherwise neurovascularly intact    RIGHT FOOT:  There is NO swelling noted at the foot  There is NO tenderness at the base of the fifth metatarsal, cuboid, or tarsal navicular  There is NO pain with metatarsal squeeze test    NEUTRAL stance  Able to " ambulate with Antalgic gait, partial weightbearing with crutches and short cam walker boot       Assessment & Plan     1. Closed fracture of posterior malleolus with delayed healing, right        DOI, 8/17/2021, early AM hours  Sleepwalking, woke falling backwards felt popping in the ankle as she fell  Sudden pain entire ankle, both legs    Late diagnosis of nondisplaced tibial distal fracture  Since this is a fracture that is typically treated nonweightbearing this is why patient is having difficulty recovering from this injury.    Continue tall leg Cam walker boot   avoid weightbearing until October 11, 2021  Then gradual weightbearing over the following week    Return in about 10 days (around 10/18/2021).  To see how she is doing with transitioning out of weightbearing in her cam walker boot                  9/14/2021 10:20 AM     HISTORY/REASON FOR EXAM:  Ankle pain, neg xray  Acute right ankle pain after falling from sleep walking x 4 weeks     TECHNIQUE/EXAM DESCRIPTION:  MRI of the RIGHT ankle without contrast.     The study was performed on a [employer] Signa 1.5 Analilia MRI scanner. T1 axial and sagittal, fast spin-echo T2 fat-suppressed axial and coronal, and fast inversion recovery sagittal images were obtained.     COMPARISON: Radiograph 8/17/2021.     FINDINGS:     Mild subcutaneous edema throughout the ankle.     ANTERIOR:  Tibialis anterior tendon: Intact.  Extensor hallucis longus: Intact.  Extensor digitorum longus tendons: Intact.     MEDIAL:  Tibialis posterior tendon: Intact.  Flexor digitorum longus: Intact.  Flexor hallucis longus: Intact.     The deltoid ligament is intact.  Spring ligament: Intact.     LATERAL:  Peroneus longus tendon: Intact.  Peroneus brevis tendon: Intact.     Anterior talofibular: Intact.  Posterior talofibular: Intact.  Calcaneofibular: Intact.  Distal tibia-fibula syndesmosis and ligaments: Intact.     POSTERIOR:  Achilles tendon is normal. There is no fluid within the  retrocalcaneal bursa.     JOINT:  Tibiotalar joint: Moderate effusion. There is physiologic joint fluid without intra-articular bodies noted.  Subtalar joint: Normal.     BONE: Nondisplaced fracture of the posterior malleolus with bone marrow edema     PLANTAR FASCIA: Intact.     __________________________________     IMPRESSION:        1. Nondisplaced fracture of the posterior malleolus with bone marrow edema. There is intra-articular extension with moderate tibiotalar joint effusion.  2. No tendon or ligamentous injury.        Exam Ended: 09/14/21 11:04 AM Last Resulted: 09/14/21  1:28 PM              8/17/2021 8:44 AM     HISTORY/REASON FOR EXAM:  Pain/Deformity Following Trauma        TECHNIQUE/EXAM DESCRIPTION AND NUMBER OF VIEWS:  3 views of the RIGHT ankle.     COMPARISON: None     FINDINGS:  There is no evidence of fracture or dislocation.  The ankle mortise is well-maintained. The talar dome is preserved.  There is soft tissue swelling overlying the ankle. There is spurring of the calcaneus at the insertion of the Achilles tendon and   plantar fascia.        IMPRESSION:     1.  No evidence of fracture or dislocation.  2.  Calcaneal spurring.  3.  Mild soft tissue swelling.        Exam Ended: 08/17/21  8:55 AM Last Resulted: 08/17/21  8:58 AM           Thank you Maria Victoria Kam D.O. for allowing me to participate in caring for your patient.

## 2021-10-18 ENCOUNTER — OFFICE VISIT (OUTPATIENT)
Dept: SPORTS MEDICINE | Facility: CLINIC | Age: 41
End: 2021-10-18
Payer: COMMERCIAL

## 2021-10-18 VITALS
TEMPERATURE: 98.3 F | SYSTOLIC BLOOD PRESSURE: 118 MMHG | OXYGEN SATURATION: 97 % | BODY MASS INDEX: 34.96 KG/M2 | DIASTOLIC BLOOD PRESSURE: 76 MMHG | WEIGHT: 190 LBS | RESPIRATION RATE: 18 BRPM | HEIGHT: 62 IN | HEART RATE: 82 BPM

## 2021-10-18 DIAGNOSIS — M25.671 ANKLE STIFFNESS, RIGHT: ICD-10-CM

## 2021-10-18 DIAGNOSIS — S82.391G: ICD-10-CM

## 2021-10-18 PROCEDURE — 99212 OFFICE O/P EST SF 10 MIN: CPT | Mod: 24 | Performed by: FAMILY MEDICINE

## 2021-10-18 ASSESSMENT — FIBROSIS 4 INDEX: FIB4 SCORE: 0.53

## 2021-10-18 NOTE — Clinical Note
Angel Irene,  We saw Lily in follow-up for her ankle injury.  Fortunately she is doing BETTER.  She is now fully weightbearing in CAM Walker boot and sometimes walking around without the boot.  We have discussed the weaning process to discontinue the boot and we provided her with some ankle strengthening exercises.  Since she is doing so well at this point we will have her follow-up on an as-needed basis.  You are well!  L

## 2021-10-18 NOTE — PROGRESS NOTES
"Subjective     Lily Vasquez is a 41 y.o. female who presents with Ankle Injury (Referral from PCP/ R ankle injury )     Referred by Maria Victoria Kam D.O.  for evaluation of RIGHT ankle pain    HPI   RIGHT ankle pain  DOI, 8/17/2021, early AM hours  Sleepwalking, woke falling backwards felt popping in the ankle as she fell  Sudden pain entire ankle, both legs  Difficulty bearing wt  Particularly on the RIGHT  Began swelling afterwards, particularly the RIGHT ankle  It is predominantly along the RIGHT  entire ankle, posterior lateral leg    Fortunately, she has been able to wean out of her crutches with minimal discomfort  She is even started walking without the cam walker boot intermittently at home    Desk work, works from home, [employer]  Walking daughters to and from school, likes walking, active around the house    Objective     /76 (BP Location: Left arm, Patient Position: Sitting, BP Cuff Size: Adult)   Pulse 82   Temp 36.8 °C (98.3 °F) (Temporal)   Resp 18   Ht 1.575 m (5' 2\")   Wt 86.2 kg (190 lb)   SpO2 97%   BMI 34.75 kg/m²      RIGHT ANKLE:  There is NO swelling noted at the ankle  Range of motion intact with dorsiflexion and plantarflexion, inversion and eversion  There is NO tenderness of the ATFL, CF or PTF ligament  There is NO tenderness of the lateral malleolus or medial malleolus  Minimal to no soft tissue tenderness at the lateral ankle with minimal tenderness at the posterior malleolus medially  Anterior drawer testing is NEGATIVE  Talar tilt testing is NEGATIVE  The foot and ankle is otherwise neurovascularly intact    NEUTRAL stance  Able to ambulate with normal gait, fully weightbearing in cam walker boot       Assessment & Plan     1. Closed fracture of posterior malleolus with delayed healing, right     2. Ankle stiffness, right        DOI, 8/17/2021, early AM hours  Sleepwalking, woke falling backwards felt popping in the ankle as she fell  Sudden pain entire " ankle, both legs    Late diagnosis of nondisplaced tibial distal fracture  Since this is a fracture that is typically treated nonweightbearing this is why patient is having difficulty recovering from this injury.    She can now wean out of her cam walker boot this week    We provided her with some home ankle exercises that she can start doing to rehab/strengthen the ankle  She can notify us if she is interested in formal physical therapy or if she is having any issues she can certainly return for follow-up or contact us as well                  9/14/2021 10:20 AM     HISTORY/REASON FOR EXAM:  Ankle pain, neg xray  Acute right ankle pain after falling from sleep walking x 4 weeks     TECHNIQUE/EXAM DESCRIPTION:  MRI of the RIGHT ankle without contrast.     The study was performed on a [employer] Signa 1.5 Analilia MRI scanner. T1 axial and sagittal, fast spin-echo T2 fat-suppressed axial and coronal, and fast inversion recovery sagittal images were obtained.     COMPARISON: Radiograph 8/17/2021.     FINDINGS:     Mild subcutaneous edema throughout the ankle.     ANTERIOR:  Tibialis anterior tendon: Intact.  Extensor hallucis longus: Intact.  Extensor digitorum longus tendons: Intact.     MEDIAL:  Tibialis posterior tendon: Intact.  Flexor digitorum longus: Intact.  Flexor hallucis longus: Intact.     The deltoid ligament is intact.  Spring ligament: Intact.     LATERAL:  Peroneus longus tendon: Intact.  Peroneus brevis tendon: Intact.     Anterior talofibular: Intact.  Posterior talofibular: Intact.  Calcaneofibular: Intact.  Distal tibia-fibula syndesmosis and ligaments: Intact.     POSTERIOR:  Achilles tendon is normal. There is no fluid within the retrocalcaneal bursa.     JOINT:  Tibiotalar joint: Moderate effusion. There is physiologic joint fluid without intra-articular bodies noted.  Subtalar joint: Normal.     BONE: Nondisplaced fracture of the posterior malleolus with bone marrow edema     PLANTAR FASCIA:  Intact.     __________________________________     IMPRESSION:        1. Nondisplaced fracture of the posterior malleolus with bone marrow edema. There is intra-articular extension with moderate tibiotalar joint effusion.  2. No tendon or ligamentous injury.        Exam Ended: 09/14/21 11:04 AM Last Resulted: 09/14/21  1:28 PM              8/17/2021 8:44 AM     HISTORY/REASON FOR EXAM:  Pain/Deformity Following Trauma        TECHNIQUE/EXAM DESCRIPTION AND NUMBER OF VIEWS:  3 views of the RIGHT ankle.     COMPARISON: None     FINDINGS:  There is no evidence of fracture or dislocation.  The ankle mortise is well-maintained. The talar dome is preserved.  There is soft tissue swelling overlying the ankle. There is spurring of the calcaneus at the insertion of the Achilles tendon and   plantar fascia.        IMPRESSION:     1.  No evidence of fracture or dislocation.  2.  Calcaneal spurring.  3.  Mild soft tissue swelling.        Exam Ended: 08/17/21  8:55 AM Last Resulted: 08/17/21  8:58 AM           Thank you Maria Victoria Kam D.O. for allowing me to participate in caring for your patient.

## 2021-11-17 ENCOUNTER — OFFICE VISIT (OUTPATIENT)
Dept: SLEEP MEDICINE | Facility: MEDICAL CENTER | Age: 41
End: 2021-11-17
Payer: COMMERCIAL

## 2021-11-17 VITALS
HEIGHT: 62 IN | WEIGHT: 188.3 LBS | OXYGEN SATURATION: 97 % | HEART RATE: 86 BPM | DIASTOLIC BLOOD PRESSURE: 78 MMHG | BODY MASS INDEX: 34.65 KG/M2 | RESPIRATION RATE: 16 BRPM | SYSTOLIC BLOOD PRESSURE: 128 MMHG

## 2021-11-17 DIAGNOSIS — Z23 NEED FOR IMMUNIZATION AGAINST INFLUENZA: ICD-10-CM

## 2021-11-17 DIAGNOSIS — G47.31 COMPLEX SLEEP APNEA SYNDROME: ICD-10-CM

## 2021-11-17 PROCEDURE — 99213 OFFICE O/P EST LOW 20 MIN: CPT | Mod: 25 | Performed by: NURSE PRACTITIONER

## 2021-11-17 PROCEDURE — 90471 IMMUNIZATION ADMIN: CPT | Performed by: NURSE PRACTITIONER

## 2021-11-17 PROCEDURE — 90686 IIV4 VACC NO PRSV 0.5 ML IM: CPT | Performed by: NURSE PRACTITIONER

## 2021-11-17 ASSESSMENT — FIBROSIS 4 INDEX: FIB4 SCORE: 0.53

## 2021-11-17 NOTE — PATIENT INSTRUCTIONS
1.  Continue using BiPAP 15/11 centimeters/H2O.  Compliance was reviewed and does show adequate control of JAN.  AHI is averaging around 2.0.  Recommended continue use nightly for optimal benefit.  Clean mask and supplies frequently with mild soap and water or vinegar water solution.  Follow-up will be 9 months for annual compliance check and reorder of mask and supplies.  Reach out via phone or MyChart with any questions or concerns before next appointment

## 2021-11-17 NOTE — PROGRESS NOTES
Chief Complaint   Patient presents with   • Follow-Up     JAN       HPI:  Lily Vasquez is a 41 y.o. year old female here today for follow-up on JAN.  Last seen 8/23/2021 by JIMMY Garcia.  Today's first visit after restarting BiPAP at 15/11 centimeters/H2O.  And had Sy Respironics device associated with recall.    PSG split-night 6/9/2018 indicated moderate to severe sleep apnea with an overall AHI 29.6/h and O2 cassidy of 86%.  She responded well to CPAP 7 cm.  Per compliance check October 2018 patient noted persistent fatigue and daytime somnolence and compliance report noted residual AHI of 29.6/h.  She underwent dedicated titration study 11/16/2018 noting mild fragmentation of sleep related to increased sleep onset latency and had best response to BiPAP with reduced AHI of 0/h and a O2 cassidy of 91%.  She was started on BiPAP 15/11 cm.    Compliance report was reviewed and shows 80% use with an average time of 3 hours and 58 minutes and 38 seconds and a resultant AHI of 2.0.  Patient remains on Sy Respironics BiPAP at 15/11 centimeters/H2O.  She denies any excessive daytime sleepiness morning headaches, palpitations, concentration or memory problems.    ROS: As per HPI and otherwise negative if not stated.    Past Medical History:   Diagnosis Date   • Anesthesia     Severe PONV   • Arthritis     TMJ   • Bronchitis    • Chickenpox    • Dental disorder     permanent retainer   • Eating disorder 7/10/2018   • Fatty liver    • GERD (gastroesophageal reflux disease)    • Gestational diabetes    • Hemorrhoids    • Hiatus hernia syndrome    • Indigestion     GERD   • Influenza    • Neck pain    • Obesity    • Vitamin D deficiency        Past Surgical History:   Procedure Laterality Date   • LISBET BY LAPAROSCOPY  12/18/2012    Performed by Idania Camara M.D. at SURGERY [facility]   • LARYNGOSCOPY  7/6/2011    Performed by EDNA MINA at SURGERY SAME DAY [facility]S   • ESOPHAGOSCOPY   7/6/2011    Performed by EDNA MINA at SURGERY SAME DAY [facility]   • TONSILLECTOMY AND ADENOIDECTOMY  1991   • OTHER  1991    TONSILLECTOMY    • CHOLECYSTECTOMY         Family History   Problem Relation Age of Onset   • Arthritis Mother    • Hypertension Mother    • Hyperlipidemia Mother    • Sleep Apnea Mother    • Psychiatric Illness Father    • Alcohol abuse Father    • Thyroid Sister    • Heart Disease Maternal Grandmother    • Heart Disease Maternal Grandfather    • Heart Disease Paternal Grandmother    • Diabetes Paternal Grandmother    • Cancer Paternal Grandfather         brain   • Stroke Other        Allergies as of 11/17/2021 - Reviewed 10/18/2021   Allergen Reaction Noted   • Codeine Vomiting 01/02/2009   • Morphine Vomiting 06/28/2011   • Sulfa drugs Vomiting 06/28/2011   • Tape Contact Dermatitis 06/28/2011        Vitals:  There were no vitals taken for this visit.    Current medications as of today   Current Outpatient Medications   Medication Sig Dispense Refill   • albuterol 108 (90 Base) MCG/ACT Aero Soln inhalation aerosol Inhale 1-2 Puffs every 6 hours as needed for Shortness of Breath. 8.5 g 0     No current facility-administered medications for this visit.         Physical Exam:   Gen:           Alert and oriented, No apparent distress. Mood and affect appropriate, normal interaction with examiner.  Eyes:          PERRL, EOM intact, sclere white, conjunctive moist.  Ears:          Not examined.   Hearing:     Grossly intact.  Nose:          Normal, no lesions or deformities.  Dentition:    Good dentition.  Oropharynx:   Tongue normal, posterior pharynx without erythema or exudate.  Neck:        Supple, trachea midline, no masses.  Respiratory Effort: No intercostal retractions or use of accessory muscles.   Lung Auscultation:      Clear to auscultation bilaterally; no rales, rhonchi or wheezing.  CV:            Regular rate and rhythm. No murmurs, rubs or gallops.  Abd:           Not  examined.   Lymphadenopathy: Not examined.  Gait and Station: Normal.  Digits and Nails: No clubbing, cyanosis, petechiae, or nodes.   Cranial Nerves: II-XII grossly intact.  Skin:        No rashes, lesions or ulcers noted.               Ext:           No cyanosis or edema.      Assessment:  No diagnosis found.    Immunizations:    Flu: 11/17/2021  COVID-19: 12/21/2020, 1/11/2021    Plan:  1.  Continue using BiPAP 15/11 centimeters/H2O.  Compliance was reviewed and does show adequate control of JAN.  AHI is averaging around 2.0.  Recommended continue use nightly for optimal benefit.  Clean mask and supplies frequently with mild soap and water or vinegar water solution.  Follow-up will be 9 months for annual compliance check and reorder of mask and supplies.  Reach out via phone or MyChart with any questions or concerns before next appointment    Please note that this dictation was created using voice recognition software. I have made every reasonable attempt to correct obvious errors, but it is possible there are errors of grammar and possibly content that I did not discover before finalizing the note.

## 2021-12-11 ENCOUNTER — PHARMACY VISIT (OUTPATIENT)
Dept: PHARMACY | Facility: MEDICAL CENTER | Age: 41
End: 2021-12-11
Payer: COMMERCIAL

## 2021-12-11 PROCEDURE — RXMED WILLOW AMBULATORY MEDICATION CHARGE: Performed by: INTERNAL MEDICINE

## 2021-12-11 RX ORDER — RNA INGREDIENT BNT-162B2 0.23 G/1.8ML
INJECTION, SUSPENSION INTRAMUSCULAR
Qty: 0.3 ML | Refills: 0 | Status: SHIPPED | OUTPATIENT
Start: 2021-12-11 | End: 2022-02-13

## 2022-02-13 ENCOUNTER — APPOINTMENT (OUTPATIENT)
Dept: URGENT CARE | Facility: CLINIC | Age: 42
End: 2022-02-13
Payer: COMMERCIAL

## 2022-02-13 ENCOUNTER — APPOINTMENT (OUTPATIENT)
Dept: RADIOLOGY | Facility: IMAGING CENTER | Age: 42
End: 2022-02-13
Attending: NURSE PRACTITIONER
Payer: COMMERCIAL

## 2022-02-13 ENCOUNTER — OFFICE VISIT (OUTPATIENT)
Dept: URGENT CARE | Facility: CLINIC | Age: 42
End: 2022-02-13
Payer: COMMERCIAL

## 2022-02-13 VITALS
HEART RATE: 95 BPM | TEMPERATURE: 96.7 F | HEIGHT: 62 IN | BODY MASS INDEX: 35.66 KG/M2 | SYSTOLIC BLOOD PRESSURE: 118 MMHG | DIASTOLIC BLOOD PRESSURE: 76 MMHG | WEIGHT: 193.8 LBS | OXYGEN SATURATION: 99 % | RESPIRATION RATE: 20 BRPM

## 2022-02-13 DIAGNOSIS — M25.561 ACUTE PAIN OF RIGHT KNEE: ICD-10-CM

## 2022-02-13 PROCEDURE — 73562 X-RAY EXAM OF KNEE 3: CPT | Mod: TC,RT | Performed by: NURSE PRACTITIONER

## 2022-02-13 PROCEDURE — 99214 OFFICE O/P EST MOD 30 MIN: CPT | Performed by: NURSE PRACTITIONER

## 2022-02-13 ASSESSMENT — ENCOUNTER SYMPTOMS
SHORTNESS OF BREATH: 0
MYALGIAS: 0
SORE THROAT: 0
DIZZINESS: 0
WEAKNESS: 0
EYE PAIN: 0
CHILLS: 0
JOINT SWELLING: 0
VOMITING: 0
FEVER: 0
NAUSEA: 0
NUMBNESS: 0

## 2022-02-13 ASSESSMENT — FIBROSIS 4 INDEX: FIB4 SCORE: 0.53

## 2022-02-13 NOTE — PROGRESS NOTES
Subjective:   Lily Vasquez is a 41 y.o. female who presents for Knee Pain (right knee, hurts with movement and putting weight, hurts to the touch, just woke up and it started hurting x 3 days )      Knee Pain  This is a new problem. Episode onset: 3 days; no injury  The problem occurs constantly. The problem has been unchanged. Pertinent negatives include no chest pain, chills, fever, joint swelling, myalgias, nausea, numbness, rash, sore throat, vomiting or weakness. Associated symptoms comments: Right knee pain  . The symptoms are aggravated by walking (bending ). She has tried rest, heat and NSAIDs for the symptoms. The treatment provided no relief.       Review of Systems   Constitutional: Negative for chills and fever.   HENT: Negative for sore throat.    Eyes: Negative for pain.   Respiratory: Negative for shortness of breath.    Cardiovascular: Negative for chest pain.   Gastrointestinal: Negative for nausea and vomiting.   Genitourinary: Negative for hematuria.   Musculoskeletal: Positive for joint pain. Negative for joint swelling and myalgias.   Skin: Negative for rash.   Neurological: Negative for dizziness, weakness and numbness.       Medications:    • This patient does not have an active medication from one of the medication groupers.    Allergies: Codeine, Morphine, Sulfa drugs, and Tape    Problem List: Lily Vasquez does not have any pertinent problems on file.    Surgical History:  Past Surgical History:   Procedure Laterality Date   • LISBET BY LAPAROSCOPY  12/18/2012    Performed by Idania Camara M.D. at SURGERY [facility]   • LARYNGOSCOPY  7/6/2011    Performed by EDNA MINA at SURGERY SAME DAY [facility]   • ESOPHAGOSCOPY  7/6/2011    Performed by EDNA MINA at SURGERY SAME DAY [facility]   • TONSILLECTOMY AND ADENOIDECTOMY  1991   • OTHER  1991    TONSILLECTOMY    • CHOLECYSTECTOMY         Past Social Hx: Lily Vasquez  reports that she has never smoked. She has never  "used smokeless tobacco. She reports that she does not drink alcohol and does not use drugs.     Past Family Hx:  Lily Vasquez family history includes Alcohol abuse in her father; Arthritis in her mother; Cancer in her paternal grandfather; Diabetes in her paternal grandmother; Heart Disease in her maternal grandfather, maternal grandmother, and paternal grandmother; Hyperlipidemia in her mother; Hypertension in her mother; Psychiatric Illness in her father; Sleep Apnea in her mother; Stroke in an other family member; Thyroid in her sister.     Problem list, medications, and allergies reviewed by myself today in Epic.     Objective:     /76 (BP Location: Right arm, Patient Position: Sitting, BP Cuff Size: Adult)   Pulse 95   Temp 35.9 °C (96.7 °F) (Temporal)   Resp 20   Ht 1.575 m (5' 2\")   Wt 87.9 kg (193 lb 12.8 oz)   SpO2 99%   BMI 35.45 kg/m²     Physical Exam  Constitutional:       Appearance: Normal appearance. She is not ill-appearing or toxic-appearing.   HENT:      Head: Normocephalic.      Right Ear: External ear normal.      Left Ear: External ear normal.      Nose: Nose normal.      Mouth/Throat:      Lips: Pink.      Mouth: Mucous membranes are moist.   Eyes:      General: Lids are normal.         Right eye: No discharge.         Left eye: No discharge.   Pulmonary:      Effort: Pulmonary effort is normal. No accessory muscle usage or respiratory distress.   Musculoskeletal:         General: Normal range of motion.      Cervical back: Full passive range of motion without pain.      Right knee: Bony tenderness present. No swelling or deformity. Normal range of motion. Tenderness present over the lateral joint line. No patellar tendon tenderness.      Instability Tests: Anterior drawer test negative. Anterior Lachman test negative.        Legs:    Skin:     Coloration: Skin is not pale.   Neurological:      Mental Status: She is alert and oriented to person, place, and time.   Psychiatric:    "      Mood and Affect: Mood normal.         Thought Content: Thought content normal.         Assessment/Plan:     Diagnosis and associated orders:     1. Acute pain of right knee  DX-KNEE 3 VIEWS RIGHT    Referral to Sports Medicine      Comments/MDM:     •  I independently reviewed the patient's imaging and agree with the interpretation of the radiologist.    1.  No acute fracture or dislocation.  2.  Osseous fragment at the tibial tuberosity measures 1 cm. It may represent sequela of Osgood-Schlatter disease. Please correlate with tenderness to palpation over the tibial tuberosity.      Patient is a 41-year-old female present with the stated above, patient having no acute injury or trauma to the right knee, patient point tender over the area tibial tuberosity consistent with x-ray findings.  Encourage patient to rest, ice, while patient follow-up with sports medicine for further evaluation and management.           My total time spent caring for the patient on the day of the encounter was 30 minutes.   This does not include time spent on separately billable procedures/tests.    Please note that this dictation was created using voice recognition software. I have made a reasonable attempt to correct obvious errors, but I expect that there are errors of grammar and possibly content that I did not discover before finalizing the note.    This note was electronically signed by Yogesh MARQUEZ.

## 2022-02-17 SDOH — ECONOMIC STABILITY: TRANSPORTATION INSECURITY
IN THE PAST 12 MONTHS, HAS LACK OF RELIABLE TRANSPORTATION KEPT YOU FROM MEDICAL APPOINTMENTS, MEETINGS, WORK OR FROM GETTING THINGS NEEDED FOR DAILY LIVING?: NO

## 2022-02-17 SDOH — ECONOMIC STABILITY: TRANSPORTATION INSECURITY
IN THE PAST 12 MONTHS, HAS LACK OF TRANSPORTATION KEPT YOU FROM MEETINGS, WORK, OR FROM GETTING THINGS NEEDED FOR DAILY LIVING?: NO

## 2022-02-17 SDOH — HEALTH STABILITY: PHYSICAL HEALTH: ON AVERAGE, HOW MANY DAYS PER WEEK DO YOU ENGAGE IN MODERATE TO STRENUOUS EXERCISE (LIKE A BRISK WALK)?: 0 DAYS

## 2022-02-17 SDOH — ECONOMIC STABILITY: FOOD INSECURITY: WITHIN THE PAST 12 MONTHS, THE FOOD YOU BOUGHT JUST DIDN'T LAST AND YOU DIDN'T HAVE MONEY TO GET MORE.: NEVER TRUE

## 2022-02-17 SDOH — ECONOMIC STABILITY: INCOME INSECURITY: IN THE LAST 12 MONTHS, WAS THERE A TIME WHEN YOU WERE NOT ABLE TO PAY THE MORTGAGE OR RENT ON TIME?: NO

## 2022-02-17 SDOH — ECONOMIC STABILITY: HOUSING INSECURITY
IN THE LAST 12 MONTHS, WAS THERE A TIME WHEN YOU DID NOT HAVE A STEADY PLACE TO SLEEP OR SLEPT IN A SHELTER (INCLUDING NOW)?: NO

## 2022-02-17 SDOH — ECONOMIC STABILITY: FOOD INSECURITY: WITHIN THE PAST 12 MONTHS, YOU WORRIED THAT YOUR FOOD WOULD RUN OUT BEFORE YOU GOT MONEY TO BUY MORE.: NEVER TRUE

## 2022-02-17 SDOH — ECONOMIC STABILITY: TRANSPORTATION INSECURITY
IN THE PAST 12 MONTHS, HAS THE LACK OF TRANSPORTATION KEPT YOU FROM MEDICAL APPOINTMENTS OR FROM GETTING MEDICATIONS?: NO

## 2022-02-17 SDOH — HEALTH STABILITY: PHYSICAL HEALTH: ON AVERAGE, HOW MANY MINUTES DO YOU ENGAGE IN EXERCISE AT THIS LEVEL?: 0 MIN

## 2022-02-17 SDOH — HEALTH STABILITY: MENTAL HEALTH
STRESS IS WHEN SOMEONE FEELS TENSE, NERVOUS, ANXIOUS, OR CAN'T SLEEP AT NIGHT BECAUSE THEIR MIND IS TROUBLED. HOW STRESSED ARE YOU?: TO SOME EXTENT

## 2022-02-17 SDOH — ECONOMIC STABILITY: HOUSING INSECURITY

## 2022-02-17 SDOH — ECONOMIC STABILITY: INCOME INSECURITY: HOW HARD IS IT FOR YOU TO PAY FOR THE VERY BASICS LIKE FOOD, HOUSING, MEDICAL CARE, AND HEATING?: NOT HARD AT ALL

## 2022-02-17 ASSESSMENT — SOCIAL DETERMINANTS OF HEALTH (SDOH)
HOW OFTEN DO YOU GET TOGETHER WITH FRIENDS OR RELATIVES?: ONCE A WEEK
HOW OFTEN DO YOU GET TOGETHER WITH FRIENDS OR RELATIVES?: ONCE A WEEK
HOW OFTEN DO YOU ATTEND CHURCH OR RELIGIOUS SERVICES?: NEVER
HOW OFTEN DO YOU ATTEND CHURCH OR RELIGIOUS SERVICES?: NEVER
WITHIN THE PAST 12 MONTHS, YOU WORRIED THAT YOUR FOOD WOULD RUN OUT BEFORE YOU GOT THE MONEY TO BUY MORE: NEVER TRUE
IN A TYPICAL WEEK, HOW MANY TIMES DO YOU TALK ON THE PHONE WITH FAMILY, FRIENDS, OR NEIGHBORS?: TWICE A WEEK
HOW OFTEN DO YOU HAVE SIX OR MORE DRINKS ON ONE OCCASION: NEVER
IN A TYPICAL WEEK, HOW MANY TIMES DO YOU TALK ON THE PHONE WITH FAMILY, FRIENDS, OR NEIGHBORS?: TWICE A WEEK
HOW HARD IS IT FOR YOU TO PAY FOR THE VERY BASICS LIKE FOOD, HOUSING, MEDICAL CARE, AND HEATING?: NOT HARD AT ALL
DO YOU BELONG TO ANY CLUBS OR ORGANIZATIONS SUCH AS CHURCH GROUPS UNIONS, FRATERNAL OR ATHLETIC GROUPS, OR SCHOOL GROUPS?: NO
DO YOU BELONG TO ANY CLUBS OR ORGANIZATIONS SUCH AS CHURCH GROUPS UNIONS, FRATERNAL OR ATHLETIC GROUPS, OR SCHOOL GROUPS?: NO
HOW OFTEN DO YOU HAVE A DRINK CONTAINING ALCOHOL: NEVER

## 2022-02-17 ASSESSMENT — LIFESTYLE VARIABLES
HOW OFTEN DO YOU HAVE A DRINK CONTAINING ALCOHOL: NEVER
HOW OFTEN DO YOU HAVE SIX OR MORE DRINKS ON ONE OCCASION: NEVER

## 2022-02-18 ENCOUNTER — OFFICE VISIT (OUTPATIENT)
Dept: SPORTS MEDICINE | Facility: CLINIC | Age: 42
End: 2022-02-18
Payer: COMMERCIAL

## 2022-02-18 VITALS
HEART RATE: 74 BPM | RESPIRATION RATE: 16 BRPM | SYSTOLIC BLOOD PRESSURE: 116 MMHG | WEIGHT: 193.8 LBS | OXYGEN SATURATION: 98 % | TEMPERATURE: 98.2 F | BODY MASS INDEX: 35.66 KG/M2 | HEIGHT: 62 IN | DIASTOLIC BLOOD PRESSURE: 76 MMHG

## 2022-02-18 DIAGNOSIS — M62.559 ATROPHY OF QUADRICEPS FEMORIS MUSCLE: ICD-10-CM

## 2022-02-18 DIAGNOSIS — M92.521 OSGOOD-SCHLATTER'S DISEASE, RIGHT: ICD-10-CM

## 2022-02-18 DIAGNOSIS — M25.561 ANTERIOR KNEE PAIN, RIGHT: ICD-10-CM

## 2022-02-18 PROCEDURE — 99214 OFFICE O/P EST MOD 30 MIN: CPT | Performed by: FAMILY MEDICINE

## 2022-02-18 ASSESSMENT — ENCOUNTER SYMPTOMS
VOMITING: 0
NAUSEA: 0
CHILLS: 0
DIZZINESS: 0
FEVER: 0
SHORTNESS OF BREATH: 0

## 2022-02-18 ASSESSMENT — FIBROSIS 4 INDEX: FIB4 SCORE: 0.53

## 2022-02-18 NOTE — PROGRESS NOTES
Chief Complaint   Patient presents with   • Knee Pain     EP/ R knee pain      Subjective     Referred by OMAR Patel  for evaluation of RIGHT knee pain  Date of onset, Thursday, February 10, 2022  Insidious onset without injury  Sudden intense pain, constant, sharp and stabbing  Normally located at the inferior to the patella and patellar tendon right around the proximal anterior tibia  No radiation  Pain was much more severe, now intermittent  Worse with activity, weightbearing, kneeling and using stairs  Improved with rest  No swelling or erythema  POSITIVE burning and tightness located at the anterior inferior knee  No weakness or mechanical symptoms  NSAIDs with minimal relief  POSITIVE night Symptoms    She fractured her RIGHT ankle back in August 2021  She recovered from that injury, but she has had intermittent achiness since then  She did have some intermittent popliteal fossa swelling and pressure/pain after her ankle injury    Desk job, [employer]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   Respiratory: Negative for shortness of breath.    Cardiovascular: Negative for chest pain.   Gastrointestinal: Negative for nausea and vomiting.   Neurological: Negative for dizziness.     PMH:  has a past medical history of Anesthesia, Arthritis, Bronchitis, Chickenpox, Dental disorder, Eating disorder (7/10/2018), Fatty liver, GERD (gastroesophageal reflux disease), Gestational diabetes, Hemorrhoids, Hiatus hernia syndrome, Indigestion, Influenza, Neck pain, Obesity, and Vitamin D deficiency.    She has no past medical history of CAD (coronary artery disease), COPD, Liver disease, or Seizure disorder (HCC).  MEDS: No current outpatient medications on file.  ALLERGIES:   Allergies   Allergen Reactions   • Codeine Vomiting     abd pain   • Morphine Vomiting   • Sulfa Drugs Vomiting     abd pain   • Tape Contact Dermatitis     SURGHX:   Past Surgical  "History:   Procedure Laterality Date   • LISBET BY LAPAROSCOPY  12/18/2012    Performed by Idania Camara M.D. at SURGERY [facility]   • LARYNGOSCOPY  7/6/2011    Performed by EDNA MINA at SURGERY SAME DAY [facility]   • ESOPHAGOSCOPY  7/6/2011    Performed by EDNA MINA at SURGERY SAME DAY [facility]   • TONSILLECTOMY AND ADENOIDECTOMY  1991   • OTHER  1991    TONSILLECTOMY    • CHOLECYSTECTOMY       SOCHX:  reports that she has never smoked. She has never used smokeless tobacco. She reports that she does not drink alcohol and does not use drugs.  FH: Family history was reviewed, no pertinent findings to report    Objective   /76 (BP Location: Left arm, Patient Position: Sitting, BP Cuff Size: Large adult)   Pulse 74   Temp 36.8 °C (98.2 °F) (Temporal)   Resp 16   Ht 1.575 m (5' 2\")   Wt 87.9 kg (193 lb 12.8 oz)   SpO2 98%   BMI 35.45 kg/m²     No acute distress  Normal respiratory effort  Mildly antalgic gait    Knee exam:  RIGHT KNEE:  Normal alignment  No visual swelling but there is a mild deformity noted at the tibial tuberosity   Anterior knee nontender  Negative apprehension  No  joint line tenderness medially or laterally  Lashawn's testing is negative medially and laterally  Lachman's testing is trace with good endpoint  No laxity to varus and valgus stress  The legs otherwise neurovascularly intact    LEFT KNEE:  Normal alignment  No visual swelling or deformity  Anterior knee nontender  Negative apprehension  No  joint line tenderness medially or laterally  Lashawn's testing is negative medially and laterally  Lachman's testing is trace with good endpoint  No laxity to varus and valgus stress  The legs otherwise neurovascularly intact    1. Anterior knee pain, right  Referral to Physical Therapy   2. Osgood-Schlatter's disease, right  Referral to Physical Therapy   3. Atrophy of quadriceps femoris muscle       Date of onset, Thursday, February 10, 2022  Insidious " onset without injury  Sudden intense pain, constant, sharp and stabbing    Referred for formal physical therapy  [facility], [address]    Suspect that her RIGHT ankle fracture she sustained back in August 2021 resulted in disuse atrophy of the RIGHT quadriceps and subsequent dysfunction of the RIGHT knee    Return in about 4 weeks (around 3/18/2022).   To see how she is doing with home exercise program and formal physical therapy        2/13/2022 12:59 PM     HISTORY/REASON FOR EXAM:  Right knee pain     TECHNIQUE/EXAM DESCRIPTION AND NUMBER OF VIEWS:  3 views of the RIGHT knee.     COMPARISON: None     FINDINGS:     BONE MINERALIZATION: Normal.  JOINTS: Preserved. No erosions.  FRACTURE: No acute fracture. Osseous fragment at the tibial tuberosity measures 1 cm.  DISLOCATION: None.  SOFT TISSUES: No mass.     IMPRESSION:     1.  No acute fracture or dislocation.  2.  Osseous fragment at the tibial tuberosity measures 1 cm. It may represent sequela of Osgood-Schlatter disease. Please correlate with tenderness to palpation over the tibial tuberosity.             Exam Ended: 02/13/22  1:07 PM Last Resulted: 02/13/22  1:09 PM           Interpreted in the office today with the patient    Thank you OMAR Patel for allowing me to participate in caring for your patient.

## 2023-05-08 ENCOUNTER — OFFICE VISIT (OUTPATIENT)
Dept: MEDICAL GROUP | Facility: PHYSICIAN GROUP | Age: 43
End: 2023-05-08
Payer: COMMERCIAL

## 2023-05-08 VITALS
BODY MASS INDEX: 35.15 KG/M2 | WEIGHT: 191 LBS | OXYGEN SATURATION: 98 % | SYSTOLIC BLOOD PRESSURE: 94 MMHG | DIASTOLIC BLOOD PRESSURE: 70 MMHG | TEMPERATURE: 98.6 F | HEIGHT: 62 IN | HEART RATE: 94 BPM

## 2023-05-08 DIAGNOSIS — E55.9 VITAMIN D DEFICIENCY: ICD-10-CM

## 2023-05-08 DIAGNOSIS — K21.9 GASTROESOPHAGEAL REFLUX DISEASE WITHOUT ESOPHAGITIS: ICD-10-CM

## 2023-05-08 DIAGNOSIS — K44.9 HIATAL HERNIA: ICD-10-CM

## 2023-05-08 DIAGNOSIS — K76.0 FATTY LIVER: ICD-10-CM

## 2023-05-08 DIAGNOSIS — Z12.4 ENCOUNTER FOR PAPANICOLAOU SMEAR FOR CERVICAL CANCER SCREENING: ICD-10-CM

## 2023-05-08 DIAGNOSIS — E28.2 PCOS (POLYCYSTIC OVARIAN SYNDROME): ICD-10-CM

## 2023-05-08 DIAGNOSIS — E61.1 IRON DEFICIENCY: ICD-10-CM

## 2023-05-08 DIAGNOSIS — Z00.00 PREVENTATIVE HEALTH CARE: ICD-10-CM

## 2023-05-08 DIAGNOSIS — R10.11 RUQ PAIN: ICD-10-CM

## 2023-05-08 DIAGNOSIS — M79.672 LEFT FOOT PAIN: ICD-10-CM

## 2023-05-08 DIAGNOSIS — R73.03 PREDIABETES: ICD-10-CM

## 2023-05-08 DIAGNOSIS — F50.9 EATING DISORDER, UNSPECIFIED TYPE: ICD-10-CM

## 2023-05-08 DIAGNOSIS — G47.31 CENTRAL SLEEP APNEA: ICD-10-CM

## 2023-05-08 DIAGNOSIS — F41.8 DEPRESSION WITH ANXIETY: ICD-10-CM

## 2023-05-08 DIAGNOSIS — G25.81 RLS (RESTLESS LEGS SYNDROME): ICD-10-CM

## 2023-05-08 PROCEDURE — 99214 OFFICE O/P EST MOD 30 MIN: CPT

## 2023-05-08 RX ORDER — GABAPENTIN 300 MG/1
300 CAPSULE ORAL NIGHTLY
Qty: 90 CAPSULE | Refills: 0 | Status: SHIPPED | OUTPATIENT
Start: 2023-05-08

## 2023-05-08 SDOH — HEALTH STABILITY: MENTAL HEALTH
STRESS IS WHEN SOMEONE FEELS TENSE, NERVOUS, ANXIOUS, OR CAN'T SLEEP AT NIGHT BECAUSE THEIR MIND IS TROUBLED. HOW STRESSED ARE YOU?: VERY MUCH

## 2023-05-08 SDOH — ECONOMIC STABILITY: FOOD INSECURITY: WITHIN THE PAST 12 MONTHS, YOU WORRIED THAT YOUR FOOD WOULD RUN OUT BEFORE YOU GOT MONEY TO BUY MORE.: NEVER TRUE

## 2023-05-08 SDOH — ECONOMIC STABILITY: FOOD INSECURITY: WITHIN THE PAST 12 MONTHS, THE FOOD YOU BOUGHT JUST DIDN'T LAST AND YOU DIDN'T HAVE MONEY TO GET MORE.: NEVER TRUE

## 2023-05-08 SDOH — HEALTH STABILITY: PHYSICAL HEALTH: ON AVERAGE, HOW MANY DAYS PER WEEK DO YOU ENGAGE IN MODERATE TO STRENUOUS EXERCISE (LIKE A BRISK WALK)?: 0 DAYS

## 2023-05-08 SDOH — ECONOMIC STABILITY: INCOME INSECURITY: IN THE LAST 12 MONTHS, WAS THERE A TIME WHEN YOU WERE NOT ABLE TO PAY THE MORTGAGE OR RENT ON TIME?: NO

## 2023-05-08 SDOH — ECONOMIC STABILITY: HOUSING INSECURITY: IN THE LAST 12 MONTHS, HOW MANY PLACES HAVE YOU LIVED?: 1

## 2023-05-08 SDOH — HEALTH STABILITY: PHYSICAL HEALTH: ON AVERAGE, HOW MANY MINUTES DO YOU ENGAGE IN EXERCISE AT THIS LEVEL?: 0 MIN

## 2023-05-08 SDOH — ECONOMIC STABILITY: INCOME INSECURITY: HOW HARD IS IT FOR YOU TO PAY FOR THE VERY BASICS LIKE FOOD, HOUSING, MEDICAL CARE, AND HEATING?: NOT HARD AT ALL

## 2023-05-08 ASSESSMENT — LIFESTYLE VARIABLES
HOW OFTEN DO YOU HAVE SIX OR MORE DRINKS ON ONE OCCASION: NEVER
HOW MANY STANDARD DRINKS CONTAINING ALCOHOL DO YOU HAVE ON A TYPICAL DAY: PATIENT DOES NOT DRINK
AUDIT-C TOTAL SCORE: 0
HOW OFTEN DO YOU HAVE A DRINK CONTAINING ALCOHOL: NEVER
SKIP TO QUESTIONS 9-10: 1

## 2023-05-08 ASSESSMENT — SOCIAL DETERMINANTS OF HEALTH (SDOH)
HOW OFTEN DO YOU HAVE A DRINK CONTAINING ALCOHOL: NEVER
HOW HARD IS IT FOR YOU TO PAY FOR THE VERY BASICS LIKE FOOD, HOUSING, MEDICAL CARE, AND HEATING?: NOT HARD AT ALL
DO YOU BELONG TO ANY CLUBS OR ORGANIZATIONS SUCH AS CHURCH GROUPS UNIONS, FRATERNAL OR ATHLETIC GROUPS, OR SCHOOL GROUPS?: NO
HOW OFTEN DO YOU ATTEND CHURCH OR RELIGIOUS SERVICES?: NEVER
HOW MANY DRINKS CONTAINING ALCOHOL DO YOU HAVE ON A TYPICAL DAY WHEN YOU ARE DRINKING: PATIENT DOES NOT DRINK
HOW OFTEN DO YOU GET TOGETHER WITH FRIENDS OR RELATIVES?: TWICE A WEEK
HOW OFTEN DO YOU ATTEND CHURCH OR RELIGIOUS SERVICES?: NEVER
HOW OFTEN DO YOU HAVE SIX OR MORE DRINKS ON ONE OCCASION: NEVER
HOW OFTEN DO YOU ATTENT MEETINGS OF THE CLUB OR ORGANIZATION YOU BELONG TO?: NEVER
IN A TYPICAL WEEK, HOW MANY TIMES DO YOU TALK ON THE PHONE WITH FAMILY, FRIENDS, OR NEIGHBORS?: TWICE A WEEK
IN A TYPICAL WEEK, HOW MANY TIMES DO YOU TALK ON THE PHONE WITH FAMILY, FRIENDS, OR NEIGHBORS?: TWICE A WEEK
WITHIN THE PAST 12 MONTHS, YOU WORRIED THAT YOUR FOOD WOULD RUN OUT BEFORE YOU GOT THE MONEY TO BUY MORE: NEVER TRUE
HOW OFTEN DO YOU ATTENT MEETINGS OF THE CLUB OR ORGANIZATION YOU BELONG TO?: NEVER
HOW OFTEN DO YOU GET TOGETHER WITH FRIENDS OR RELATIVES?: TWICE A WEEK
DO YOU BELONG TO ANY CLUBS OR ORGANIZATIONS SUCH AS CHURCH GROUPS UNIONS, FRATERNAL OR ATHLETIC GROUPS, OR SCHOOL GROUPS?: NO

## 2023-05-08 ASSESSMENT — FIBROSIS 4 INDEX: FIB4 SCORE: 0.56

## 2023-05-08 NOTE — PROGRESS NOTES
"Subjective:     CC:    Chief Complaint   Patient presents with    Establish Care     RUQ pain and Left foot pain       HISTORY OF THE PRESENT ILLNESS: Lily is a pleasant 43 y.o. female here today to establish care and discuss the chronic conditions below. His/her prior PCP was Dr. Kam.    Problem   Ruq Pain    She has had pain in her RUQ over the last few months.  It comes and goes.  No nausea.  Occasionally radiates. She does not have a gall bladder.   Pain is similar to when she has ruptured cysts from her PCOS.       Left Foot Pain    She has a pain on the top of her foot that radiates to her ankle.   Pain can be constant or off and on.   It doesn't swell or turn red.  She has tried heating pad and ibuprofen with no results.       Depression With Anxiety    Pt has felt depression and anxiety about her mothers death 2 years ago. She doesn't take medication. She used to do therapy.     Eating Disorder    Pt is an emotional eater. She states she is at her highest weight right now. She doesn't eat meals. She snacks all day long.      Central Sleep Apnea    She does have a BiPap machine that she hasn't used in a year.   She doesn't sleep well and usually feels tired the next day.  Sleep ranges 2 to 4 hours of sleep.        Rls (Restless Legs Syndrome)    She does not take anything for it.  She was told to take iron in the past but never followed-up     Hiatal Hernia    She saw GI several years ago. She may want to explore getting it repaired.      Pcos (Polycystic Ovarian Syndrome)    She took metformin in the past but stopped after she gave birth to her twin girls, who are almost 9 years old.      Fatty Liver    Diagnosed around age of 18 with elevated triglycerides from birth control.         Health Maintenance: Completed    ROS:   All systems negative except as addressed in assessment and plan.     Objective:     Exam: BP 94/70   Pulse 94   Temp 37 °C (98.6 °F)   Ht 1.575 m (5' 2\")   Wt 86.6 kg (191 lb)  "  SpO2 98%  Body mass index is 34.93 kg/m².    Physical Exam  Vitals reviewed.   Constitutional:       Appearance: Normal appearance.   HENT:      Right Ear: Tympanic membrane normal.      Left Ear: Tympanic membrane normal.   Cardiovascular:      Rate and Rhythm: Normal rate.      Pulses: Normal pulses.      Heart sounds: Normal heart sounds.   Pulmonary:      Effort: Pulmonary effort is normal.      Breath sounds: Normal breath sounds.   Abdominal:      General: Abdomen is flat.      Palpations: Abdomen is soft.   Musculoskeletal:         General: Normal range of motion.   Skin:     General: Skin is warm and dry.   Neurological:      Mental Status: Mental status is at baseline.   Psychiatric:         Mood and Affect: Mood normal.         Behavior: Behavior normal.         Labs: Reviewed from 08/06/21    Assessment & Plan:   43 y.o. female with the following -    1. Preventative health care  Health conditions and medications reviewed and updated. All screenings discussed and up-to-date. Health maintenance completed.     - TSH WITH REFLEX TO FT4; Future  - VITAMIN D,25 HYDROXY (DEFICIENCY); Future  - Lipid Profile; Future  - HEMOGLOBIN A1C; Future  - Comp Metabolic Panel; Future  - CBC WITH DIFFERENTIAL; Future    2. Fatty liver  3. RUQ pain  - US-RUQ; Future  - LIPASE; Future    4. PCOS (polycystic ovarian syndrome)  - Referral to OB/Gyn    5. Left foot pain  New problem.  Suspect neuropathy as she does have occasional numbness and tingling and shooting pain.  - gabapentin (NEURONTIN) 300 MG Cap; Take 1 Capsule by mouth every evening.  Dispense: 90 Capsule; Refill: 0    6. RLS (restless legs syndrome)  7. Iron deficiency  - FERRITIN; Future  - IRON/TOTAL IRON BIND; Future    8. Vitamin D deficiency  - VITAMIN D,25 HYDROXY (DEFICIENCY); Future    9. Gastroesophageal reflux disease without esophagitis  10. Hiatal hernia  - Referral to Gastroenterology    11. Prediabetes  - HEMOGLOBIN A1C; Future    12. Central sleep  apnea    13. Depression with anxiety    14. Eating disorder, unspecified type    15. Encounter for Papanicolaou smear for cervical cancer screening  - Referral to OB/Gyn    Patient was educated in proper administration of medication(s) ordered today including safety, possible SE, risks, benefits, rationale and alternatives to therapy.   Supportive care, differential diagnoses, and indications for immediate follow-up discussed with patient.    Pathogenesis of diagnosis discussed including typical length and natural progression.    Instructed to return to clinic or nearest emergency department for any change in condition, further concerns, or worsening of symptoms.  Patient states understanding of the plan of care and discharge instructions.    Return in about 4 weeks (around 6/5/2023).    I spent a total of 32 minutes with record review, exam, and communication with the patient, communication with other providers, and documentation of this encounter. This does not include time spent on separately billable procedures/tests.    I have placed the above orders and discussed them with an approved delegating provider.  The MA is performing the below orders under the direction of Dr. Casiano.    Please note that this dictation was created using voice recognition software. I have worked with consultants from the vendor as well as technical experts from [facility] to optimize the interface. I have made every reasonable attempt to correct obvious errors, but I expect that there are errors of grammar and possibly content that I did not discover before finalizing the note.

## 2023-05-13 ENCOUNTER — APPOINTMENT (OUTPATIENT)
Dept: RADIOLOGY | Facility: MEDICAL CENTER | Age: 43
End: 2023-05-13
Attending: EMERGENCY MEDICINE
Payer: COMMERCIAL

## 2023-05-13 ENCOUNTER — HOSPITAL ENCOUNTER (EMERGENCY)
Facility: MEDICAL CENTER | Age: 43
End: 2023-05-13
Attending: EMERGENCY MEDICINE
Payer: COMMERCIAL

## 2023-05-13 VITALS
HEART RATE: 72 BPM | SYSTOLIC BLOOD PRESSURE: 130 MMHG | DIASTOLIC BLOOD PRESSURE: 78 MMHG | RESPIRATION RATE: 16 BRPM | TEMPERATURE: 98 F | HEIGHT: 62 IN | WEIGHT: 192.46 LBS | BODY MASS INDEX: 35.42 KG/M2 | OXYGEN SATURATION: 96 %

## 2023-05-13 DIAGNOSIS — R10.9 RIGHT FLANK PAIN: ICD-10-CM

## 2023-05-13 LAB
ALBUMIN SERPL BCP-MCNC: 4.2 G/DL (ref 3.2–4.9)
ALBUMIN/GLOB SERPL: 1.4 G/DL
ALP SERPL-CCNC: 70 U/L (ref 30–99)
ALT SERPL-CCNC: 15 U/L (ref 2–50)
ANION GAP SERPL CALC-SCNC: 12 MMOL/L (ref 7–16)
APPEARANCE UR: CLEAR
AST SERPL-CCNC: 21 U/L (ref 12–45)
BACTERIA #/AREA URNS HPF: NEGATIVE /HPF
BASOPHILS # BLD AUTO: 0.6 % (ref 0–1.8)
BASOPHILS # BLD: 0.05 K/UL (ref 0–0.12)
BILIRUB SERPL-MCNC: 0.3 MG/DL (ref 0.1–1.5)
BILIRUB UR QL STRIP.AUTO: NEGATIVE
BUN SERPL-MCNC: 13 MG/DL (ref 8–22)
CALCIUM ALBUM COR SERPL-MCNC: 9.4 MG/DL (ref 8.5–10.5)
CALCIUM SERPL-MCNC: 9.6 MG/DL (ref 8.5–10.5)
CHLORIDE SERPL-SCNC: 104 MMOL/L (ref 96–112)
CO2 SERPL-SCNC: 23 MMOL/L (ref 20–33)
COLOR UR: YELLOW
CREAT SERPL-MCNC: 0.87 MG/DL (ref 0.5–1.4)
EOSINOPHIL # BLD AUTO: 0.21 K/UL (ref 0–0.51)
EOSINOPHIL NFR BLD: 2.7 % (ref 0–6.9)
EPI CELLS #/AREA URNS HPF: NEGATIVE /HPF
ERYTHROCYTE [DISTWIDTH] IN BLOOD BY AUTOMATED COUNT: 43.7 FL (ref 35.9–50)
GFR SERPLBLD CREATININE-BSD FMLA CKD-EPI: 85 ML/MIN/1.73 M 2
GLOBULIN SER CALC-MCNC: 3 G/DL (ref 1.9–3.5)
GLUCOSE SERPL-MCNC: 92 MG/DL (ref 65–99)
GLUCOSE UR STRIP.AUTO-MCNC: NEGATIVE MG/DL
HCG SERPL QL: NEGATIVE
HCT VFR BLD AUTO: 35.6 % (ref 37–47)
HGB BLD-MCNC: 11.2 G/DL (ref 12–16)
IMM GRANULOCYTES # BLD AUTO: 0.03 K/UL (ref 0–0.11)
IMM GRANULOCYTES NFR BLD AUTO: 0.4 % (ref 0–0.9)
KETONES UR STRIP.AUTO-MCNC: NEGATIVE MG/DL
LEUKOCYTE ESTERASE UR QL STRIP.AUTO: ABNORMAL
LIPASE SERPL-CCNC: 25 U/L (ref 11–82)
LYMPHOCYTES # BLD AUTO: 2.31 K/UL (ref 1–4.8)
LYMPHOCYTES NFR BLD: 29.2 % (ref 22–41)
MCH RBC QN AUTO: 24.4 PG (ref 27–33)
MCHC RBC AUTO-ENTMCNC: 31.5 G/DL (ref 33.6–35)
MCV RBC AUTO: 77.6 FL (ref 81.4–97.8)
MICRO URNS: ABNORMAL
MONOCYTES # BLD AUTO: 0.54 K/UL (ref 0–0.85)
MONOCYTES NFR BLD AUTO: 6.8 % (ref 0–13.4)
NEUTROPHILS # BLD AUTO: 4.76 K/UL (ref 2–7.15)
NEUTROPHILS NFR BLD: 60.3 % (ref 44–72)
NITRITE UR QL STRIP.AUTO: NEGATIVE
NRBC # BLD AUTO: 0 K/UL
NRBC BLD-RTO: 0 /100 WBC
PH UR STRIP.AUTO: 6 [PH] (ref 5–8)
PLATELET # BLD AUTO: 411 K/UL (ref 164–446)
PMV BLD AUTO: 9.5 FL (ref 9–12.9)
POTASSIUM SERPL-SCNC: 4.5 MMOL/L (ref 3.6–5.5)
PROT SERPL-MCNC: 7.2 G/DL (ref 6–8.2)
PROT UR QL STRIP: 100 MG/DL
RBC # BLD AUTO: 4.59 M/UL (ref 4.2–5.4)
RBC # URNS HPF: >150 /HPF
RBC UR QL AUTO: ABNORMAL
RENAL EPI CELLS #/AREA URNS HPF: NEGATIVE /HPF
SODIUM SERPL-SCNC: 139 MMOL/L (ref 135–145)
SP GR UR STRIP.AUTO: >=1.03
UROBILINOGEN UR STRIP.AUTO-MCNC: 0.2 MG/DL
WBC # BLD AUTO: 7.9 K/UL (ref 4.8–10.8)
WBC #/AREA URNS HPF: ABNORMAL /HPF

## 2023-05-13 PROCEDURE — 80053 COMPREHEN METABOLIC PANEL: CPT

## 2023-05-13 PROCEDURE — 81001 URINALYSIS AUTO W/SCOPE: CPT

## 2023-05-13 PROCEDURE — 85025 COMPLETE CBC W/AUTO DIFF WBC: CPT

## 2023-05-13 PROCEDURE — 36415 COLL VENOUS BLD VENIPUNCTURE: CPT

## 2023-05-13 PROCEDURE — 96372 THER/PROPH/DIAG INJ SC/IM: CPT

## 2023-05-13 PROCEDURE — 83690 ASSAY OF LIPASE: CPT

## 2023-05-13 PROCEDURE — 99284 EMERGENCY DEPT VISIT MOD MDM: CPT

## 2023-05-13 PROCEDURE — 700111 HCHG RX REV CODE 636 W/ 250 OVERRIDE (IP): Performed by: EMERGENCY MEDICINE

## 2023-05-13 PROCEDURE — 84703 CHORIONIC GONADOTROPIN ASSAY: CPT

## 2023-05-13 PROCEDURE — 74176 CT ABD & PELVIS W/O CONTRAST: CPT

## 2023-05-13 RX ORDER — KETOROLAC TROMETHAMINE 30 MG/ML
30 INJECTION, SOLUTION INTRAMUSCULAR; INTRAVENOUS ONCE
Status: COMPLETED | OUTPATIENT
Start: 2023-05-13 | End: 2023-05-13

## 2023-05-13 RX ADMIN — KETOROLAC TROMETHAMINE 30 MG: 30 INJECTION, SOLUTION INTRAMUSCULAR; INTRAVENOUS at 12:10

## 2023-05-13 ASSESSMENT — PAIN DESCRIPTION - PAIN TYPE: TYPE: ACUTE PAIN

## 2023-05-13 ASSESSMENT — FIBROSIS 4 INDEX: FIB4 SCORE: 0.56

## 2023-05-13 NOTE — ED TRIAGE NOTES
"Chief Complaint   Patient presents with    RUQ Pain     Ongoing x1 week worsening in severity, radiating to back and to L side of back. Pt denies any N/V.     /78   Pulse 95   Temp 36.6 °C (97.9 °F) (Oral)   Resp 16   Ht 1.575 m (5' 2\")   Wt 87.3 kg (192 lb 7.4 oz)   SpO2 97%   BMI 35.20 kg/m²     Pt ambulatory to triage for above, protocol ordered.   "

## 2023-05-13 NOTE — ED PROVIDER NOTES
ED Provider Note    CHIEF COMPLAINT  Chief Complaint   Patient presents with    RUQ Pain     Ongoing x1 week worsening in severity, radiating to back and to L side of back. Pt denies any N/V.       EXTERNAL RECORDS REVIEWED  Outpatient Notes outpatient primary care note reviewed.  She was seen 4 days ago or 5 days ago for establishing care.  She has had pain in her right upper quadrant over the last few months it was coming and going.  She has a regular quadrant ultrasound scheduled.    HPI/ROS  LIMITATION TO HISTORY   Select: : None  OUTSIDE HISTORIAN(S):  none    Lily Idalia Vasquez is a 43 y.o. female who presents for evaluation of right-sided flank pain.  Patient started having this over this last week but it has been worse since waking up this morning.  She does have more intense periods of pain typically brought on by movement.  No associated nausea or vomiting.  She says it started on the right and she has some pain on the left as well.  She recently started her period.  Prior to today she did not notice any corresponding pain that was worse with food.  She has had a prior cholecystectomy.  No increased urinary frequency or fevers.  Or dysuria.    PAST MEDICAL HISTORY   has a past medical history of Anemia, Anesthesia, Arthritis, Bronchitis, Chickenpox, Dental disorder, Eating disorder (07/10/2018), Fatty liver, GERD (gastroesophageal reflux disease), Gestational diabetes, Hemorrhoids, Hiatus hernia syndrome, Indigestion, Influenza, Neck pain, Obesity, and Vitamin D deficiency.    SURGICAL HISTORY   has a past surgical history that includes tonsillectomy and adenoidectomy (1991); other (1991); laryngoscopy (7/6/2011); esophagoscopy (7/6/2011); samina by laparoscopy (12/18/2012); and cholecystectomy.    FAMILY HISTORY  Family History   Problem Relation Age of Onset    Arthritis Mother     Hypertension Mother     Hyperlipidemia Mother     Sleep Apnea Mother     Psychiatric Illness Father     Alcohol abuse Father      "Thyroid Sister     Heart Disease Maternal Grandmother     Heart Disease Maternal Grandfather     Heart Disease Paternal Grandmother     Diabetes Paternal Grandmother     Cancer Paternal Grandfather         brain    Stroke Other        SOCIAL HISTORY  Social History     Tobacco Use    Smoking status: Never    Smokeless tobacco: Never   Vaping Use    Vaping Use: Never used   Substance and Sexual Activity    Alcohol use: No    Drug use: No    Sexual activity: Yes     Partners: Male     Birth control/protection: Condom     Comment: , works at [employer]       CURRENT MEDICATIONS  Home Medications       Reviewed by Lauren Eddy R.N. (Registered Nurse) on 05/13/23 at 1041  Med List Status: Partial     Medication Last Dose Status   gabapentin (NEURONTIN) 300 MG Cap  Active                    ALLERGIES  Allergies   Allergen Reactions    Codeine Vomiting     abd pain    Morphine Vomiting    Sulfa Drugs Vomiting     abd pain    Tape Contact Dermatitis       PHYSICAL EXAM  VITAL SIGNS: /78   Pulse 95   Temp 36.6 °C (97.9 °F) (Oral)   Resp 16   Ht 1.575 m (5' 2\")   Wt 87.3 kg (192 lb 7.4 oz)   SpO2 97%   BMI 35.20 kg/m²    Constitutional: Alert in no apparent distress.  HENT: Normocephalic, Atraumatic, Bilateral external ears normal. Nose normal.   Eyes:  Conjunctiva normal, non-icteric.   Heart: Regular rate and rhythm, no murmurs.   Lungs: Non-labored respirations, clear to ausculation bilaterally  Back: No CVA tenderness.  Abdomen: Soft nontender nondistended.  No Stratton sign  Skin: Warm, Dry, No erythema, No rash.   Neurologic: Alert, Grossly non-focal.   Psychiatric: Affect normal, Judgment normal, Mood normal, Appears appropriate and not intoxicated.   Extremities: Intact distal pulses, No edema, No tenderness.       DIAGNOSTIC STUDIES / PROCEDURES      LABS  Results for orders placed or performed during the hospital encounter of 05/13/23   CBC WITH DIFFERENTIAL   Result Value Ref Range    " WBC 7.9 4.8 - 10.8 K/uL    RBC 4.59 4.20 - 5.40 M/uL    Hemoglobin 11.2 (L) 12.0 - 16.0 g/dL    Hematocrit 35.6 (L) 37.0 - 47.0 %    MCV 77.6 (L) 81.4 - 97.8 fL    MCH 24.4 (L) 27.0 - 33.0 pg    MCHC 31.5 (L) 33.6 - 35.0 g/dL    RDW 43.7 35.9 - 50.0 fL    Platelet Count 411 164 - 446 K/uL    MPV 9.5 9.0 - 12.9 fL    Neutrophils-Polys 60.30 44.00 - 72.00 %    Lymphocytes 29.20 22.00 - 41.00 %    Monocytes 6.80 0.00 - 13.40 %    Eosinophils 2.70 0.00 - 6.90 %    Basophils 0.60 0.00 - 1.80 %    Immature Granulocytes 0.40 0.00 - 0.90 %    Nucleated RBC 0.00 /100 WBC    Neutrophils (Absolute) 4.76 2.00 - 7.15 K/uL    Lymphs (Absolute) 2.31 1.00 - 4.80 K/uL    Monos (Absolute) 0.54 0.00 - 0.85 K/uL    Eos (Absolute) 0.21 0.00 - 0.51 K/uL    Baso (Absolute) 0.05 0.00 - 0.12 K/uL    Immature Granulocytes (abs) 0.03 0.00 - 0.11 K/uL    NRBC (Absolute) 0.00 K/uL   COMP METABOLIC PANEL   Result Value Ref Range    Sodium 139 135 - 145 mmol/L    Potassium 4.5 3.6 - 5.5 mmol/L    Chloride 104 96 - 112 mmol/L    Co2 23 20 - 33 mmol/L    Anion Gap 12.0 7.0 - 16.0    Glucose 92 65 - 99 mg/dL    Bun 13 8 - 22 mg/dL    Creatinine 0.87 0.50 - 1.40 mg/dL    Calcium 9.6 8.5 - 10.5 mg/dL    AST(SGOT) 21 12 - 45 U/L    ALT(SGPT) 15 2 - 50 U/L    Alkaline Phosphatase 70 30 - 99 U/L    Total Bilirubin 0.3 0.1 - 1.5 mg/dL    Albumin 4.2 3.2 - 4.9 g/dL    Total Protein 7.2 6.0 - 8.2 g/dL    Globulin 3.0 1.9 - 3.5 g/dL    A-G Ratio 1.4 g/dL   LIPASE   Result Value Ref Range    Lipase 25 11 - 82 U/L   HCG QUAL SERUM   Result Value Ref Range    Beta-Hcg Qualitative Serum Negative Negative   URINALYSIS,CULTURE IF INDICATED    Specimen: Urine   Result Value Ref Range    Color Yellow     Character Clear     Specific Gravity >=1.030 <1.035    Ph 6.0 5.0 - 8.0    Glucose Negative Negative mg/dL    Ketones Negative Negative mg/dL    Protein 100 (A) Negative mg/dL    Bilirubin Negative Negative    Urobilinogen, Urine 0.2 Negative    Nitrite Negative  Negative    Leukocyte Esterase Trace (A) Negative    Occult Blood Moderate (A) Negative    Micro Urine Req Microscopic    URINE MICROSCOPIC (W/UA)   Result Value Ref Range    WBC 0-2 /hpf    RBC >150 (A) /hpf    Bacteria Negative None /hpf    Epithelial Cells Negative /hpf    Epithelial Cells Renal Negative /hpf   CORRECTED CALCIUM   Result Value Ref Range    Correct Calcium 9.4 8.5 - 10.5 mg/dL   ESTIMATED GFR   Result Value Ref Range    GFR (CKD-EPI) 85 >60 mL/min/1.73 m 2         RADIOLOGY  I have independently interpreted the diagnostic imaging associated with this visit and am waiting the final reading from the radiologist.   My preliminary interpretation is as follows: normal ct  Radiologist interpretation:   CT-RENAL COLIC EVALUATION(A/P W/O)   Final Result      1.  No evidence of renal or ureteral stone or evidence of hydronephrosis. No evidence of inflammatory change.      2.  Small hiatal hernia.      3.  Prior cholecystectomy.      4.  Fatty liver.      5.  Mild splenomegaly.      6.  8mm left lower pole renal angiomyolipoma.      7.  Sigmoid and descending colon diverticulosis.      8.  Bilateral ovarian follicles.            COURSE & MEDICAL DECISION MAKING    ED Observation Status? Yes; I am placing the patient in to an observation status due to a diagnostic uncertainty as well as therapeutic intensity. Patient placed in observation status at 11:42 AM, 5/13/2023.     Observation plan is as follows: labs and imaging    Upon Reevaluation, the patient's condition has: Improved; and will be discharged.    Patient discharged from ED Observation status at 1pm (Time) 5/13/2023 (Date).     INITIAL ASSESSMENT, COURSE AND PLAN  Care Narrative: This is a 43-year-old female who presents with right-sided flank pain.  Patient has been having this over the last number of days but worse recently.  She has had a prior cholecystectomy she did not have any right upper quadrant tenderness I was more concerned for possible  kidney stone.    CBC shows a normal white count without left shift.  She does have some mild anemia.  Lipase is normal CMP shows normal LFTs.  Urinalysis does show evidence of hematuria but she is on her period.  There is no indicative UTI seen on urinalysis.  CT scan was performed and did not show any evidence of hydro or ureteral nephrosis.  There is no inflammatory change.  She has number of other incidentaloma's.  She does have access to MyChart and can review these and bring them up with her new primary care doctor.  At this point is unclear what is causing her pain however I do not see any evidence of acute surgical cause of her pain.  I do think she can be discharged with outpatient follow-up.  She is agreeable to this plan.      DISPOSITION AND DISCUSSIONS  I have discussed management of the patient with the following physicians and STACI's:  none    Discussion of management with other [facility] or appropriate source(s): None     Escalation of care considered, and ultimately not performed:acute inpatient care management, however at this time, the patient is most appropriate for outpatient management    Barriers to care at this time, including but not limited to:  none .     Decision tools and prescription drugs considered including, but not limited to:  none .     The patient will return for new or worsening symptoms and is stable at the time of discharge. Patient was given return precautions. Patient and/or family member verbalizes understanding and will comply.    DISPOSITION:  Patient will be discharged home in stable condition.    FOLLOW UP:  Jeff Berrios A.P.R.N.  [address]  [facility] [address] [phone]    Schedule an appointment as soon as possible for a visit       [facility], Emergency Dept  [address] [facility] [address] [phone]    Return to the emergency department for worsening nausea vomiting fevers rash or other concerns.      OUTPATIENT  MEDICATIONS:  Discharge Medication List as of 5/13/2023  1:09 PM            FINAL DIAGNOSIS  1. Right flank pain           Electronically signed by: Radhika Chen M.D., 5/13/2023 11:41 AM

## 2023-05-14 ENCOUNTER — HOSPITAL ENCOUNTER (EMERGENCY)
Facility: MEDICAL CENTER | Age: 43
End: 2023-05-14
Attending: EMERGENCY MEDICINE
Payer: COMMERCIAL

## 2023-05-14 VITALS
OXYGEN SATURATION: 95 % | WEIGHT: 192.02 LBS | DIASTOLIC BLOOD PRESSURE: 55 MMHG | BODY MASS INDEX: 35.34 KG/M2 | SYSTOLIC BLOOD PRESSURE: 115 MMHG | HEIGHT: 62 IN | HEART RATE: 68 BPM | RESPIRATION RATE: 18 BRPM | TEMPERATURE: 96.5 F

## 2023-05-14 DIAGNOSIS — G89.29 CHRONIC RIGHT FLANK PAIN: ICD-10-CM

## 2023-05-14 DIAGNOSIS — R10.9 CHRONIC RIGHT FLANK PAIN: ICD-10-CM

## 2023-05-14 LAB
ALBUMIN SERPL BCP-MCNC: 4.3 G/DL (ref 3.2–4.9)
ALBUMIN/GLOB SERPL: 1.3 G/DL
ALP SERPL-CCNC: 71 U/L (ref 30–99)
ALT SERPL-CCNC: 16 U/L (ref 2–50)
ANION GAP SERPL CALC-SCNC: 13 MMOL/L (ref 7–16)
AST SERPL-CCNC: 21 U/L (ref 12–45)
BASOPHILS # BLD AUTO: 0.6 % (ref 0–1.8)
BASOPHILS # BLD: 0.05 K/UL (ref 0–0.12)
BILIRUB SERPL-MCNC: 0.3 MG/DL (ref 0.1–1.5)
BUN SERPL-MCNC: 15 MG/DL (ref 8–22)
CALCIUM ALBUM COR SERPL-MCNC: 9.1 MG/DL (ref 8.5–10.5)
CALCIUM SERPL-MCNC: 9.3 MG/DL (ref 8.5–10.5)
CHLORIDE SERPL-SCNC: 105 MMOL/L (ref 96–112)
CO2 SERPL-SCNC: 23 MMOL/L (ref 20–33)
CREAT SERPL-MCNC: 0.87 MG/DL (ref 0.5–1.4)
EOSINOPHIL # BLD AUTO: 0.2 K/UL (ref 0–0.51)
EOSINOPHIL NFR BLD: 2.4 % (ref 0–6.9)
ERYTHROCYTE [DISTWIDTH] IN BLOOD BY AUTOMATED COUNT: 44.2 FL (ref 35.9–50)
GFR SERPLBLD CREATININE-BSD FMLA CKD-EPI: 85 ML/MIN/1.73 M 2
GLOBULIN SER CALC-MCNC: 3.2 G/DL (ref 1.9–3.5)
GLUCOSE SERPL-MCNC: 93 MG/DL (ref 65–99)
HCG SERPL QL: NEGATIVE
HCT VFR BLD AUTO: 35.5 % (ref 37–47)
HGB BLD-MCNC: 11 G/DL (ref 12–16)
IMM GRANULOCYTES # BLD AUTO: 0.03 K/UL (ref 0–0.11)
IMM GRANULOCYTES NFR BLD AUTO: 0.4 % (ref 0–0.9)
LIPASE SERPL-CCNC: 22 U/L (ref 11–82)
LYMPHOCYTES # BLD AUTO: 1.82 K/UL (ref 1–4.8)
LYMPHOCYTES NFR BLD: 21.5 % (ref 22–41)
MCH RBC QN AUTO: 24 PG (ref 27–33)
MCHC RBC AUTO-ENTMCNC: 31 G/DL (ref 33.6–35)
MCV RBC AUTO: 77.5 FL (ref 81.4–97.8)
MONOCYTES # BLD AUTO: 0.46 K/UL (ref 0–0.85)
MONOCYTES NFR BLD AUTO: 5.4 % (ref 0–13.4)
NEUTROPHILS # BLD AUTO: 5.9 K/UL (ref 2–7.15)
NEUTROPHILS NFR BLD: 69.7 % (ref 44–72)
NRBC # BLD AUTO: 0 K/UL
NRBC BLD-RTO: 0 /100 WBC
PLATELET # BLD AUTO: 421 K/UL (ref 164–446)
PMV BLD AUTO: 9.7 FL (ref 9–12.9)
POTASSIUM SERPL-SCNC: 4.3 MMOL/L (ref 3.6–5.5)
PROT SERPL-MCNC: 7.5 G/DL (ref 6–8.2)
RBC # BLD AUTO: 4.58 M/UL (ref 4.2–5.4)
SODIUM SERPL-SCNC: 141 MMOL/L (ref 135–145)
WBC # BLD AUTO: 8.5 K/UL (ref 4.8–10.8)

## 2023-05-14 PROCEDURE — 700102 HCHG RX REV CODE 250 W/ 637 OVERRIDE(OP): Performed by: EMERGENCY MEDICINE

## 2023-05-14 PROCEDURE — 99284 EMERGENCY DEPT VISIT MOD MDM: CPT

## 2023-05-14 PROCEDURE — 84703 CHORIONIC GONADOTROPIN ASSAY: CPT

## 2023-05-14 PROCEDURE — 700111 HCHG RX REV CODE 636 W/ 250 OVERRIDE (IP): Performed by: EMERGENCY MEDICINE

## 2023-05-14 PROCEDURE — 96372 THER/PROPH/DIAG INJ SC/IM: CPT

## 2023-05-14 PROCEDURE — 85025 COMPLETE CBC W/AUTO DIFF WBC: CPT

## 2023-05-14 PROCEDURE — A9270 NON-COVERED ITEM OR SERVICE: HCPCS | Performed by: EMERGENCY MEDICINE

## 2023-05-14 PROCEDURE — 83690 ASSAY OF LIPASE: CPT

## 2023-05-14 PROCEDURE — 80053 COMPREHEN METABOLIC PANEL: CPT

## 2023-05-14 PROCEDURE — 36415 COLL VENOUS BLD VENIPUNCTURE: CPT

## 2023-05-14 RX ORDER — DICYCLOMINE HCL 20 MG
20 TABLET ORAL EVERY 6 HOURS
Qty: 120 TABLET | Refills: 0 | Status: SHIPPED | OUTPATIENT
Start: 2023-05-14

## 2023-05-14 RX ORDER — KETOROLAC TROMETHAMINE 30 MG/ML
30 INJECTION, SOLUTION INTRAMUSCULAR; INTRAVENOUS ONCE
Status: COMPLETED | OUTPATIENT
Start: 2023-05-14 | End: 2023-05-14

## 2023-05-14 RX ORDER — ONDANSETRON 4 MG/1
4 TABLET, ORALLY DISINTEGRATING ORAL ONCE
Status: COMPLETED | OUTPATIENT
Start: 2023-05-14 | End: 2023-05-14

## 2023-05-14 RX ORDER — GABAPENTIN 100 MG/1
100 CAPSULE ORAL 3 TIMES DAILY
Qty: 90 CAPSULE | Refills: 0 | Status: SHIPPED | OUTPATIENT
Start: 2023-05-14

## 2023-05-14 RX ADMIN — ONDANSETRON 4 MG: 4 TABLET, ORALLY DISINTEGRATING ORAL at 12:03

## 2023-05-14 RX ADMIN — KETOROLAC TROMETHAMINE 30 MG: 30 INJECTION, SOLUTION INTRAMUSCULAR; INTRAVENOUS at 12:04

## 2023-05-14 RX ADMIN — LIDOCAINE HYDROCHLORIDE 30 ML: 20 SOLUTION OROPHARYNGEAL at 12:04

## 2023-05-14 ASSESSMENT — FIBROSIS 4 INDEX: FIB4 SCORE: 0.57

## 2023-05-14 NOTE — ED TRIAGE NOTES
"Chief Complaint   Patient presents with    Flank Pain     R flank X 1 week, seen here for the same yesterday with labs and CT scan, dc'd from ER, pt has worse pain today, hx cholecystectomy, pt is currently menstruating on regular cycle, pt denies other signs of bleeding, denies constipation, denies vomiting      Pt ambulatory to triage for above complaints, VSS on RA, GCS 15, NAD.    Pt returned to lobby. Educated on triage process and to inform staff of any changes.     /82   Pulse 94   Temp 36.9 °C (98.4 °F) (Temporal)   Resp 16   Ht 1.575 m (5' 2\")   Wt 87.1 kg (192 lb 0.3 oz)   SpO2 99%   BMI 35.12 kg/m²     "

## 2023-05-14 NOTE — ED PROVIDER NOTES
ED Provider Note    CHIEF COMPLAINT  Chief Complaint   Patient presents with    Flank Pain     R flank X 1 week, seen here for the same yesterday with labs and CT scan, dc'd from ER, pt has worse pain today, hx cholecystectomy, pt is currently menstruating on regular cycle, pt denies other signs of bleeding, denies constipation, denies vomiting        EXTERNAL RECORDS REVIEWED  Outpatient Notes patient had an outpatient office visit 5/8/2023 for the same complaint of right upper quadrant pain and intermittent for months    HPI/ROS  LIMITATION TO HISTORY   Select: : None  OUTSIDE HISTORIAN(S):  Significant other the patient's  states that she has had an EGD and colonoscopy over a year ago with GI consultants where they found a hiatal hernia and some diverticuli    Lily Vasquez is a 43 y.o. female who presents complaining of severe right flank pain that has been coming and going for the last week.  She was seen here in the emergency department yesterday and had labs and a CT scan which were unremarkable.  The patient states that this morning she was in severe pain and could barely stand and get out of bed.  Taking deep breaths makes her abdomen hurt worse but she does not have chest pain or shortness of breath.  She states the pain is in her right abdomen.  Moving around does make it worse.  She denies any pain in her legs.  She has nausea but no vomiting.  She denies having any to constipation, diarrhea dysuria frequency urgency.  She has not had any fevers or chills.  She takes Pepcid and Tums daily her  states that she takes it a lot.  She also has been taking Motrin and Tylenol but nothing is helping her symptoms.  She has not had any fevers or chills.  She has no coughing or sore throat.  She has not had any rashes    PAST MEDICAL HISTORY   has a past medical history of Anemia, Anesthesia, Arthritis, Bronchitis, Chickenpox, Dental disorder, Eating disorder (07/10/2018), Fatty liver, GERD  "(gastroesophageal reflux disease), Gestational diabetes, Hemorrhoids, Hiatus hernia syndrome, Indigestion, Influenza, Neck pain, Obesity, and Vitamin D deficiency.    SURGICAL HISTORY   has a past surgical history that includes tonsillectomy and adenoidectomy (1991); other (1991); laryngoscopy (7/6/2011); esophagoscopy (7/6/2011); samina by laparoscopy (12/18/2012); and cholecystectomy.    FAMILY HISTORY  Family History   Problem Relation Age of Onset    Arthritis Mother     Hypertension Mother     Hyperlipidemia Mother     Sleep Apnea Mother     Psychiatric Illness Father     Alcohol abuse Father     Thyroid Sister     Heart Disease Maternal Grandmother     Heart Disease Maternal Grandfather     Heart Disease Paternal Grandmother     Diabetes Paternal Grandmother     Cancer Paternal Grandfather         brain    Stroke Other        SOCIAL HISTORY  Social History     Tobacco Use    Smoking status: Never    Smokeless tobacco: Never   Vaping Use    Vaping Use: Never used   Substance and Sexual Activity    Alcohol use: No    Drug use: No    Sexual activity: Yes     Partners: Male     Birth control/protection: Condom     Comment: , works at [employer]       CURRENT MEDICATIONS  Home Medications       Reviewed by Jose Bland R.N. (Registered Nurse) on 05/14/23 at 1109  Med List Status: Not Addressed     Medication Last Dose Status   gabapentin (NEURONTIN) 300 MG Cap  Active                    ALLERGIES  Allergies   Allergen Reactions    Codeine Vomiting     abd pain    Morphine Vomiting    Sulfa Drugs Vomiting     abd pain    Tape Contact Dermatitis       PHYSICAL EXAM  VITAL SIGNS: /82   Pulse 94   Temp 36.9 °C (98.4 °F) (Temporal)   Resp 16   Ht 1.575 m (5' 2\")   Wt 87.1 kg (192 lb 0.3 oz)   SpO2 99%   BMI 35.12 kg/m²      Constitutional: Well developed, Well nourished, uncomfortable appearing,   HEENT: Normocephalic, Atraumatic,  external ears normal, pharynx pink,  Mucous  Membranes moist, " No rhinorrhea or mucosal edema  Eyes: PERRL, EOMI, Conjunctiva normal, No discharge.   Neck: Normal range of motion, No tenderness, Supple, No stridor.   Lymphatic: No lymphadenopathy    Cardiovascular: Regular Rate and Rhythm, No murmurs,  rubs, or gallops.   Thorax & Lungs: Lungs clear to auscultation bilaterally, No respiratory distress, No wheezes, rhales or rhonchi, No chest wall tenderness.   Abdomen: Bowel sounds normal, Soft, endorses tenderness in her right upper quadrant but when I palpate her abdomen I cannot reproduce the tenderness, non distended,  No pulsatile masses., no rebound guarding or peritoneal signs.   Skin: Warm, Dry, No erythema, No rash, no vesicular lesions on her back  Back:  No CVA tenderness,  No spinal tenderness, bony crepitance step offs or instability.   Extremities: Equal, intact distal pulses, No cyanosis, clubbing or edema,  No tenderness.   Musculoskeletal: Good range of motion in all major joints. No tenderness to palpation or major deformities noted.   Neurologic: Alert & oriented   No focal deficits noted.   Psychiatric: Affect normal, Judgment normal, Mood normal    DIAGNOSTIC STUDIES / PROCEDURES  EKG  I have independently interpreted this EKG  none    LABS  Results for orders placed or performed during the hospital encounter of 05/14/23   CBC WITH DIFFERENTIAL   Result Value Ref Range    WBC 8.5 4.8 - 10.8 K/uL    RBC 4.58 4.20 - 5.40 M/uL    Hemoglobin 11.0 (L) 12.0 - 16.0 g/dL    Hematocrit 35.5 (L) 37.0 - 47.0 %    MCV 77.5 (L) 81.4 - 97.8 fL    MCH 24.0 (L) 27.0 - 33.0 pg    MCHC 31.0 (L) 33.6 - 35.0 g/dL    RDW 44.2 35.9 - 50.0 fL    Platelet Count 421 164 - 446 K/uL    MPV 9.7 9.0 - 12.9 fL    Neutrophils-Polys 69.70 44.00 - 72.00 %    Lymphocytes 21.50 (L) 22.00 - 41.00 %    Monocytes 5.40 0.00 - 13.40 %    Eosinophils 2.40 0.00 - 6.90 %    Basophils 0.60 0.00 - 1.80 %    Immature Granulocytes 0.40 0.00 - 0.90 %    Nucleated RBC 0.00 /100 WBC    Neutrophils  (Absolute) 5.90 2.00 - 7.15 K/uL    Lymphs (Absolute) 1.82 1.00 - 4.80 K/uL    Monos (Absolute) 0.46 0.00 - 0.85 K/uL    Eos (Absolute) 0.20 0.00 - 0.51 K/uL    Baso (Absolute) 0.05 0.00 - 0.12 K/uL    Immature Granulocytes (abs) 0.03 0.00 - 0.11 K/uL    NRBC (Absolute) 0.00 K/uL   COMP METABOLIC PANEL   Result Value Ref Range    Sodium 141 135 - 145 mmol/L    Potassium 4.3 3.6 - 5.5 mmol/L    Chloride 105 96 - 112 mmol/L    Co2 23 20 - 33 mmol/L    Anion Gap 13.0 7.0 - 16.0    Glucose 93 65 - 99 mg/dL    Bun 15 8 - 22 mg/dL    Creatinine 0.87 0.50 - 1.40 mg/dL    Calcium 9.3 8.5 - 10.5 mg/dL    AST(SGOT) 21 12 - 45 U/L    ALT(SGPT) 16 2 - 50 U/L    Alkaline Phosphatase 71 30 - 99 U/L    Total Bilirubin 0.3 0.1 - 1.5 mg/dL    Albumin 4.3 3.2 - 4.9 g/dL    Total Protein 7.5 6.0 - 8.2 g/dL    Globulin 3.2 1.9 - 3.5 g/dL    A-G Ratio 1.3 g/dL   LIPASE   Result Value Ref Range    Lipase 22 11 - 82 U/L   HCG QUAL SERUM   Result Value Ref Range    Beta-Hcg Qualitative Serum Negative Negative   CORRECTED CALCIUM   Result Value Ref Range    Correct Calcium 9.1 8.5 - 10.5 mg/dL   ESTIMATED GFR   Result Value Ref Range    GFR (CKD-EPI) 85 >60 mL/min/1.73 m 2         RADIOLOGY  I have independently interpreted the diagnostic imaging associated with this visit and am waiting the final reading from the radiologist.   My preliminary interpretation is as follows:  none; I did review the CT abdomen pelvis performed yesterday when the patient was in the emergency department for the same complaint  Radiologist interpretation: none     COURSE & MEDICAL DECISION MAKING    ED Observation Status? Yes; I am placing the patient in to an observation status due to a diagnostic uncertainty as well as therapeutic intensity. Patient placed in observation status at 11:44 AM, 5/14/2023.     Observation plan is as follows: The patient will be given a GI cocktail and Zofran and a shot of Toradol.  Labs were repeated    Upon Reevaluation, the  patient's condition has: Improved; and will be discharged.    Patient discharged from ED Observation status at 1:16 PM  (Time) 5/15/23 (Date).     INITIAL ASSESSMENT, COURSE AND PLAN  Care Narrative: This is a 43-year-old female who has had a cholecystectomy in the past complaining of intermittent right upper quadrant pain that has become severe over the last 2 days.  She has a history of a hiatal hernia and diverticulosis.  She does take antacids daily.  She was seen here yesterday and had a CT of the abdomen pelvis that did not show any kidney stones hydronephrosis obstruction or intra-abdominal infection.  Her lab work was normal yesterday as well.  She was discharged home to follow-up with her primary care doctor but returns today because she is worse.  On physical exam she is uncomfortable but I cannot reproduce any tenderness on palpation of her abdomen.  She has no vesicular lesions that would indicate she has shingles.  I have ordered pain medication and we have rechecked her lab work.        ADDITIONAL PROBLEM LIST  Anemia  Anxiety  Hiatal Hernia  PCOS  Fatty Liver   Cholecystectomy  DISPOSITION AND DISCUSSIONS  The patient's lab work today has a normal white count of 8.5 hemoglobin 11 platelet count 421 with normal differential.  Her pregnancy test is negative.  Her lipase is normal at 22.  Urine was not rechecked because she does not have any dysuria.  Comprehensive metabolic panel is normal with normal kidney function normal electrolytes and normal liver function test.  The patient's abdomen is soft and benign.  Upon recheck she is sleeping.  She still states that she hurts but she feels a little bit improved.  At this time I will discharge her home and advised her to follow-up with her primary care physician tomorrow and get in touch with GI consultants for repeat evaluation.  I will discharge the patient home with Bentyl and gabapentin.  I advised her to return if she develops a fever vomiting or any  worsening symptoms.  I also advised her to try placing a lidocaine patch on that area of her right flank that hurts the most to see if that helps her symptoms.  I have discussed management of the patient with the following physicians and STACI's:  none    Discussion of management with other hospitals or appropriate source(s): None     Escalation of care considered, and ultimately not performed:acute inpatient care management, however at this time, the patient is most appropriate for outpatient management    Barriers to care at this time, including but not limited to:  none.     Decision tools and prescription drugs considered including, but not limited to: Pain Medications gabapentin and bentyl .  The patient will return for new or worsening symptoms and is stable at the time of discharge.    The patient is referred to a primary physician for blood pressure management, diabetic screening, and for all other preventative health concerns.      DISPOSITION:  Patient will be discharged home in stable condition.    FOLLOW UP:  OMAR Wong  [address] [address] [phone]    Call in 1 day  for recheck    [facility]  [address] [facility] [id number] [phone]  Call in 1 day  to establish care      OUTPATIENT MEDICATIONS:  New Prescriptions    DICYCLOMINE (BENTYL) 20 MG TAB    Take 1 Tablet by mouth every 6 hours.    GABAPENTIN (NEURONTIN) 100 MG CAP    Take 1 Capsule by mouth 3 times a day.       FINAL DIAGNOSIS  1. Chronic right flank pain           Electronically signed by: Sabi Iverson M.D., 5/14/2023 11:38 AM

## 2023-05-17 ENCOUNTER — OFFICE VISIT (OUTPATIENT)
Dept: MEDICAL GROUP | Facility: PHYSICIAN GROUP | Age: 43
End: 2023-05-17
Payer: COMMERCIAL

## 2023-05-17 VITALS
DIASTOLIC BLOOD PRESSURE: 76 MMHG | OXYGEN SATURATION: 98 % | WEIGHT: 191 LBS | BODY MASS INDEX: 35.15 KG/M2 | HEART RATE: 80 BPM | HEIGHT: 62 IN | TEMPERATURE: 99.6 F | SYSTOLIC BLOOD PRESSURE: 102 MMHG

## 2023-05-17 DIAGNOSIS — D17.71 ANGIOMYOLIPOMA OF KIDNEY: ICD-10-CM

## 2023-05-17 DIAGNOSIS — R10.11 RUQ PAIN: ICD-10-CM

## 2023-05-17 DIAGNOSIS — R10.9 RIGHT FLANK PAIN: ICD-10-CM

## 2023-05-17 PROBLEM — K57.90 DIVERTICULOSIS: Status: ACTIVE | Noted: 2023-05-17

## 2023-05-17 PROBLEM — R16.1 SPLENOMEGALY: Status: ACTIVE | Noted: 2023-05-17

## 2023-05-17 PROCEDURE — 99214 OFFICE O/P EST MOD 30 MIN: CPT

## 2023-05-17 PROCEDURE — 3074F SYST BP LT 130 MM HG: CPT

## 2023-05-17 PROCEDURE — 3078F DIAST BP <80 MM HG: CPT

## 2023-05-17 RX ORDER — DICLOFENAC SODIUM 75 MG/1
75 TABLET, DELAYED RELEASE ORAL 2 TIMES DAILY
Qty: 60 TABLET | Refills: 1 | Status: SHIPPED | OUTPATIENT
Start: 2023-05-17

## 2023-05-17 ASSESSMENT — FIBROSIS 4 INDEX: FIB4 SCORE: 0.54

## 2023-05-17 ASSESSMENT — PATIENT HEALTH QUESTIONNAIRE - PHQ9: CLINICAL INTERPRETATION OF PHQ2 SCORE: 0

## 2023-05-17 NOTE — PROGRESS NOTES
"Subjective:     CC:   Chief Complaint   Patient presents with    [facility] Follow-up     HISTORY OF THE PRESENT ILLNESS: Lily is a pleasant 43 y.o. female here today to follow-up after presenting to the ER with right upper quadrant pain that radiates to her right flank.  Patient had a complete work-up over 2 days that ruled out kidney stones, UTI, and MI.  Her blood counts were normal.  No signs of infection.    Patient presents today stating her pain continues.  It is a constant stabbing sensation. It's exacerbated by certain movements, which feels like pain is exploding throughout her right side.   She also feels a tightness around her right flank.    She has some nausea. No appetite.  No sensation of fullness.  No fevers. No urinary symptoms.    She states she has PCOS and had similar symptoms in the past when she had a ruptured cyst.    ROS:  All systems negative expect as addressed in assessment and plan.     Objective:     Exam:  /76 (BP Location: Left arm, Patient Position: Sitting, BP Cuff Size: Adult)   Pulse 80   Temp 37.6 °C (99.6 °F) (Temporal)   Ht 1.575 m (5' 2\")   Wt 86.6 kg (191 lb)   SpO2 98%   BMI 34.93 kg/m²  Body mass index is 34.93 kg/m².    Physical Exam  Vitals reviewed.   Constitutional:       Appearance: Normal appearance.   Cardiovascular:      Rate and Rhythm: Normal rate.   Pulmonary:      Effort: Pulmonary effort is normal.   Abdominal:      General: Abdomen is flat.      Palpations: Abdomen is soft.   Musculoskeletal:         General: Normal range of motion.   Skin:     General: Skin is warm and dry.   Neurological:      Mental Status: Mental status is at baseline.   Psychiatric:         Mood and Affect: Mood normal.         Behavior: Behavior normal.       Labs: Results reviewed from 05/14/23    Assessment & Plan:     43 y.o. female with the following -    1. Right flank pain  2. RUQ pain  Chronic, worsening.  On her CT renal colic it did show a " possible angiomyolipoma.  She does have anemia which is consistent with this.  I will order CT renal with and without for closer assessment.  I will additionally order transvaginal ultrasound to assess for the possibility of ruptured cyst.  Patient received a Toradol injection in the ER and states this helped temporarily.  She was also given a prescription for gabapentin as well as dicyclomine with minimal results.  Will try to diclofenac for pain relief.  - US-PELVIC COMPLETE (TRANSABDOMINAL/TRANSVAGINAL) (COMBO); Future  - CT-RENAL WITH & W/O; Future  - diclofenac  (VOLTAREN) 75 MG Tablet Delayed Response; Take 1 Tablet by mouth 2 times a day.  Dispense: 60 Tablet; Refill: 1    Patient was educated in proper administration of medication(s) ordered today including safety, possible SE, risks, benefits, rationale and alternatives to therapy.   Supportive care, differential diagnoses, and indications for immediate follow-up discussed with patient.    Pathogenesis of diagnosis discussed including typical length and natural progression.    Instructed to return to clinic or nearest emergency department for any change in condition, further concerns, or worsening of symptoms.  Patient states understanding of the plan of care and discharge instructions.    Return in about 4 weeks (around 6/14/2023) for Follow-up RUQ pain.    I spent a total of 30 minutes with record review, exam, and communication with the patient, communication with other providers, and documentation of this encounter. This does not include time spent on separately billable procedures/tests.    I have placed the above orders and discussed them with an approved delegating provider.  The MA is performing the below orders under the direction of Dr. Casiano.    Please note that this dictation was created using voice recognition software. I have worked with consultants from the vendor as well as technical experts from [employer] to optimize the interface. I  have made every reasonable attempt to correct obvious errors, but I expect that there are errors of grammar and possibly content that I did not discover before finalizing the note.

## 2023-05-18 PROBLEM — D17.71 ANGIOMYOLIPOMA OF KIDNEY: Status: ACTIVE | Noted: 2023-05-18

## 2023-05-30 ENCOUNTER — HOSPITAL ENCOUNTER (OUTPATIENT)
Dept: LAB | Facility: MEDICAL CENTER | Age: 43
End: 2023-05-30
Payer: COMMERCIAL

## 2023-05-30 DIAGNOSIS — E55.9 VITAMIN D DEFICIENCY: ICD-10-CM

## 2023-05-30 DIAGNOSIS — R10.11 RUQ PAIN: ICD-10-CM

## 2023-05-30 DIAGNOSIS — E61.1 IRON DEFICIENCY: ICD-10-CM

## 2023-05-30 DIAGNOSIS — Z00.00 PREVENTATIVE HEALTH CARE: ICD-10-CM

## 2023-05-30 DIAGNOSIS — R73.03 PREDIABETES: ICD-10-CM

## 2023-05-30 DIAGNOSIS — G25.81 RLS (RESTLESS LEGS SYNDROME): ICD-10-CM

## 2023-05-30 LAB
25(OH)D3 SERPL-MCNC: 15 NG/ML (ref 30–100)
ALBUMIN SERPL BCP-MCNC: 4.4 G/DL (ref 3.2–4.9)
ALBUMIN/GLOB SERPL: 1.4 G/DL
ALP SERPL-CCNC: 73 U/L (ref 30–99)
ALT SERPL-CCNC: 24 U/L (ref 2–50)
ANION GAP SERPL CALC-SCNC: 12 MMOL/L (ref 7–16)
AST SERPL-CCNC: 32 U/L (ref 12–45)
BASOPHILS # BLD AUTO: 0.6 % (ref 0–1.8)
BASOPHILS # BLD: 0.05 K/UL (ref 0–0.12)
BILIRUB SERPL-MCNC: 0.4 MG/DL (ref 0.1–1.5)
BUN SERPL-MCNC: 11 MG/DL (ref 8–22)
CALCIUM ALBUM COR SERPL-MCNC: 9.7 MG/DL (ref 8.5–10.5)
CALCIUM SERPL-MCNC: 10 MG/DL (ref 8.5–10.5)
CHLORIDE SERPL-SCNC: 101 MMOL/L (ref 96–112)
CHOLEST SERPL-MCNC: 143 MG/DL (ref 100–199)
CO2 SERPL-SCNC: 24 MMOL/L (ref 20–33)
CREAT SERPL-MCNC: 0.85 MG/DL (ref 0.5–1.4)
EOSINOPHIL # BLD AUTO: 0.2 K/UL (ref 0–0.51)
EOSINOPHIL NFR BLD: 2.5 % (ref 0–6.9)
ERYTHROCYTE [DISTWIDTH] IN BLOOD BY AUTOMATED COUNT: 41.5 FL (ref 35.9–50)
EST. AVERAGE GLUCOSE BLD GHB EST-MCNC: 126 MG/DL
FERRITIN SERPL-MCNC: 11.8 NG/ML (ref 10–291)
GFR SERPLBLD CREATININE-BSD FMLA CKD-EPI: 87 ML/MIN/1.73 M 2
GLOBULIN SER CALC-MCNC: 3.1 G/DL (ref 1.9–3.5)
GLUCOSE SERPL-MCNC: 82 MG/DL (ref 65–99)
HBA1C MFR BLD: 6 % (ref 4–5.6)
HCT VFR BLD AUTO: 37.6 % (ref 37–47)
HDLC SERPL-MCNC: 42 MG/DL
HGB BLD-MCNC: 11.5 G/DL (ref 12–16)
IMM GRANULOCYTES # BLD AUTO: 0.02 K/UL (ref 0–0.11)
IMM GRANULOCYTES NFR BLD AUTO: 0.2 % (ref 0–0.9)
IRON SATN MFR SERPL: 9 % (ref 15–55)
IRON SERPL-MCNC: 39 UG/DL (ref 40–170)
LDLC SERPL CALC-MCNC: 73 MG/DL
LIPASE SERPL-CCNC: 24 U/L (ref 11–82)
LYMPHOCYTES # BLD AUTO: 2.54 K/UL (ref 1–4.8)
LYMPHOCYTES NFR BLD: 31.5 % (ref 22–41)
MCH RBC QN AUTO: 23.7 PG (ref 27–33)
MCHC RBC AUTO-ENTMCNC: 30.6 G/DL (ref 32.2–35.5)
MCV RBC AUTO: 77.4 FL (ref 81.4–97.8)
MONOCYTES # BLD AUTO: 0.55 K/UL (ref 0–0.85)
MONOCYTES NFR BLD AUTO: 6.8 % (ref 0–13.4)
NEUTROPHILS # BLD AUTO: 4.7 K/UL (ref 1.82–7.42)
NEUTROPHILS NFR BLD: 58.4 % (ref 44–72)
NRBC # BLD AUTO: 0 K/UL
NRBC BLD-RTO: 0 /100 WBC (ref 0–0.2)
PLATELET # BLD AUTO: 461 K/UL (ref 164–446)
PMV BLD AUTO: 10.2 FL (ref 9–12.9)
POTASSIUM SERPL-SCNC: 4.3 MMOL/L (ref 3.6–5.5)
PROT SERPL-MCNC: 7.5 G/DL (ref 6–8.2)
RBC # BLD AUTO: 4.86 M/UL (ref 4.2–5.4)
SODIUM SERPL-SCNC: 137 MMOL/L (ref 135–145)
TIBC SERPL-MCNC: 422 UG/DL (ref 250–450)
TRIGL SERPL-MCNC: 141 MG/DL (ref 0–149)
TSH SERPL DL<=0.005 MIU/L-ACNC: 2.65 UIU/ML (ref 0.38–5.33)
UIBC SERPL-MCNC: 383 UG/DL (ref 110–370)
WBC # BLD AUTO: 8.1 K/UL (ref 4.8–10.8)

## 2023-05-30 PROCEDURE — 82306 VITAMIN D 25 HYDROXY: CPT

## 2023-05-30 PROCEDURE — 36415 COLL VENOUS BLD VENIPUNCTURE: CPT

## 2023-05-30 PROCEDURE — 83036 HEMOGLOBIN GLYCOSYLATED A1C: CPT

## 2023-05-30 PROCEDURE — 83690 ASSAY OF LIPASE: CPT

## 2023-05-30 PROCEDURE — 83540 ASSAY OF IRON: CPT

## 2023-05-30 PROCEDURE — 80053 COMPREHEN METABOLIC PANEL: CPT

## 2023-05-30 PROCEDURE — 85025 COMPLETE CBC W/AUTO DIFF WBC: CPT

## 2023-05-30 PROCEDURE — 82728 ASSAY OF FERRITIN: CPT

## 2023-05-30 PROCEDURE — 84443 ASSAY THYROID STIM HORMONE: CPT

## 2023-05-30 PROCEDURE — 83550 IRON BINDING TEST: CPT

## 2023-05-30 PROCEDURE — 80061 LIPID PANEL: CPT

## 2023-06-02 ENCOUNTER — HOSPITAL ENCOUNTER (OUTPATIENT)
Dept: RADIOLOGY | Facility: MEDICAL CENTER | Age: 43
End: 2023-06-02
Payer: COMMERCIAL

## 2023-06-02 DIAGNOSIS — R10.11 RUQ PAIN: ICD-10-CM

## 2023-06-02 DIAGNOSIS — R10.9 RIGHT FLANK PAIN: ICD-10-CM

## 2023-06-02 DIAGNOSIS — D17.71 ANGIOMYOLIPOMA OF KIDNEY: ICD-10-CM

## 2023-06-02 PROCEDURE — 74170 CT ABD WO CNTRST FLWD CNTRST: CPT

## 2023-06-02 PROCEDURE — 700117 HCHG RX CONTRAST REV CODE 255

## 2023-06-02 RX ADMIN — IOHEXOL 100 ML: 350 INJECTION, SOLUTION INTRAVENOUS at 08:45

## 2023-06-05 ENCOUNTER — OFFICE VISIT (OUTPATIENT)
Dept: MEDICAL GROUP | Facility: PHYSICIAN GROUP | Age: 43
End: 2023-06-05
Payer: COMMERCIAL

## 2023-06-05 VITALS
HEART RATE: 88 BPM | WEIGHT: 189 LBS | BODY MASS INDEX: 34.78 KG/M2 | OXYGEN SATURATION: 97 % | HEIGHT: 62 IN | SYSTOLIC BLOOD PRESSURE: 96 MMHG | DIASTOLIC BLOOD PRESSURE: 70 MMHG | TEMPERATURE: 98.3 F

## 2023-06-05 DIAGNOSIS — E55.9 VITAMIN D DEFICIENCY: ICD-10-CM

## 2023-06-05 DIAGNOSIS — E61.1 IRON DEFICIENCY: ICD-10-CM

## 2023-06-05 DIAGNOSIS — E55.9 VITAMIN D INSUFFICIENCY: ICD-10-CM

## 2023-06-05 DIAGNOSIS — R10.11 RUQ ABDOMINAL PAIN: ICD-10-CM

## 2023-06-05 PROCEDURE — 3078F DIAST BP <80 MM HG: CPT

## 2023-06-05 PROCEDURE — 99214 OFFICE O/P EST MOD 30 MIN: CPT

## 2023-06-05 PROCEDURE — 3074F SYST BP LT 130 MM HG: CPT

## 2023-06-05 RX ORDER — FERROUS SULFATE 325(65) MG
325 TABLET ORAL
Qty: 60 TABLET | Refills: 0 | Status: SHIPPED | OUTPATIENT
Start: 2023-06-05 | End: 2023-08-07

## 2023-06-05 RX ORDER — ERGOCALCIFEROL 1.25 MG/1
50000 CAPSULE ORAL
Qty: 12 CAPSULE | Refills: 0 | Status: SHIPPED | OUTPATIENT
Start: 2023-06-05

## 2023-06-05 ASSESSMENT — FIBROSIS 4 INDEX: FIB4 SCORE: 0.61

## 2023-06-05 NOTE — PROGRESS NOTES
"Subjective:     CC:   Chief Complaint   Patient presents with    Lab Results       HISTORY OF THE PRESENT ILLNESS: Lily is a pleasant 43 y.o. female here today to follow-up on CT results after presenting with right upper quadrant pain.  She did go to the ER where they ruled out kidney stones, UTI and MI.  Blood counts were normal.  No signs of infection.    Patient reports today her pain is somewhat improved, however, it is still very much of there. Patient describes the pain as a stabbing sensation, which is exacerbated by certain movements.      She also feels a tightness around her right flank.  She has some nausea.  No appetite.  No early satiety. No fevers no urinary symptoms.    Patient does have a history of PCOS.  She states the pain is similar to a sensation she once felt when a cyst ruptured.  She does have a transvaginal ultrasound scheduled in about 1 week on 6/13/2023.    Health Maintenance: Completed    ROS:  All systems negative expect as addressed in assessment and plan.     Objective:     Exam:  BP 96/70 (BP Location: Left arm, Patient Position: Sitting, BP Cuff Size: Adult)   Pulse 88   Temp 36.8 °C (98.3 °F) (Temporal)   Ht 1.575 m (5' 2\")   Wt 85.7 kg (189 lb)   SpO2 97%   BMI 34.57 kg/m²  Body mass index is 34.57 kg/m².    Physical Exam  Vitals reviewed.   Constitutional:       Appearance: Normal appearance.   Cardiovascular:      Rate and Rhythm: Normal rate.   Pulmonary:      Effort: Pulmonary effort is normal.   Musculoskeletal:         General: Normal range of motion.   Skin:     General: Skin is warm and dry.   Neurological:      Mental Status: Mental status is at baseline.   Psychiatric:         Mood and Affect: Mood normal.         Behavior: Behavior normal.       Labs: Results reviewed from 05/30/23    Assessment & Plan:     43 y.o. female with the following -    1. RUQ abdominal pain  Patient to follow-up with transvaginal ultrasound on 6/13/2023.  We will additionally refer to " GI for an endoscopy.  - Referral to Gastroenterology    2. Vitamin D insufficiency  New problem detected on labs.  Vit D - 15  - vitamin D2, Ergocalciferol, (DRISDOL) 1.25 MG (50301 UT) Cap capsule; Take 1 Capsule by mouth every 7 days. Take with food.  Dispense: 12 Capsule; Refill: 0  - VITAMIN D 25-HYDROXY    3. Iron deficiency  - ferrous sulfate 325 (65 Fe) MG tablet; Take 1 Tablet by mouth every 48 hours for 120 days.  Dispense: 60 Tablet; Refill: 0  - FERRITIN  - CBC WITHOUT DIFFERENTIAL    Patient was educated in proper administration of medication(s) ordered today including safety, possible SE, risks, benefits, rationale and alternatives to therapy.   Supportive care, differential diagnoses, and indications for immediate follow-up discussed with patient.    Pathogenesis of diagnosis discussed including typical length and natural progression.    Instructed to return to clinic or nearest emergency department for any change in condition, further concerns, or worsening of symptoms.  Patient states understanding of the plan of care and discharge instructions.    Return in about 1 year (around 6/5/2024) for Wellness Visit, Lab Review.    I spent a total of 30 minutes with record review, exam, and communication with the patient, communication with other providers, and documentation of this encounter. This does not include time spent on separately billable procedures/tests.    I have placed the above orders and discussed them with an approved delegating provider.  The MA is performing the below orders under the direction of Dr. Casiano.    Please note that this dictation was created using voice recognition software. I have worked with consultants from the vendor as well as technical experts from [facility] to optimize the interface. I have made every reasonable attempt to correct obvious errors, but I expect that there are errors of grammar and possibly content that I did not discover before finalizing the  note.

## 2023-06-13 ENCOUNTER — APPOINTMENT (OUTPATIENT)
Dept: RADIOLOGY | Facility: MEDICAL CENTER | Age: 43
End: 2023-06-13
Payer: COMMERCIAL

## 2023-08-03 DIAGNOSIS — E61.1 IRON DEFICIENCY: ICD-10-CM

## 2023-08-07 RX ORDER — FERROUS SULFATE 325(65) MG
325 TABLET ORAL
Qty: 60 TABLET | Refills: 0 | Status: SHIPPED | OUTPATIENT
Start: 2023-08-07 | End: 2023-12-05

## 2023-09-13 ENCOUNTER — DOCUMENTATION (OUTPATIENT)
Dept: HEALTH INFORMATION MANAGEMENT | Facility: OTHER | Age: 43
End: 2023-09-13
Payer: COMMERCIAL

## 2024-01-31 ENCOUNTER — TELEPHONE (OUTPATIENT)
Dept: HEALTH INFORMATION MANAGEMENT | Facility: OTHER | Age: 44
End: 2024-01-31
Payer: COMMERCIAL

## 2024-08-21 ENCOUNTER — HOSPITAL ENCOUNTER (OUTPATIENT)
Dept: LAB | Facility: MEDICAL CENTER | Age: 44
End: 2024-08-21
Payer: COMMERCIAL

## 2024-08-21 LAB
ALBUMIN SERPL BCP-MCNC: 4.4 G/DL (ref 3.2–4.9)
ALBUMIN/GLOB SERPL: 1.4 G/DL
ALP SERPL-CCNC: 77 U/L (ref 30–99)
ALT SERPL-CCNC: 17 U/L (ref 2–50)
ANION GAP SERPL CALC-SCNC: 13 MMOL/L (ref 7–16)
AST SERPL-CCNC: 26 U/L (ref 12–45)
BASOPHILS # BLD AUTO: 0.6 % (ref 0–1.8)
BASOPHILS # BLD: 0.05 K/UL (ref 0–0.12)
BILIRUB SERPL-MCNC: 0.3 MG/DL (ref 0.1–1.5)
BUN SERPL-MCNC: 12 MG/DL (ref 8–22)
CALCIUM ALBUM COR SERPL-MCNC: 9.5 MG/DL (ref 8.5–10.5)
CALCIUM SERPL-MCNC: 9.8 MG/DL (ref 8.5–10.5)
CHLORIDE SERPL-SCNC: 102 MMOL/L (ref 96–112)
CO2 SERPL-SCNC: 23 MMOL/L (ref 20–33)
CREAT SERPL-MCNC: 0.79 MG/DL (ref 0.5–1.4)
EOSINOPHIL # BLD AUTO: 0.29 K/UL (ref 0–0.51)
EOSINOPHIL NFR BLD: 3.7 % (ref 0–6.9)
ERYTHROCYTE [DISTWIDTH] IN BLOOD BY AUTOMATED COUNT: 46.1 FL (ref 35.9–50)
FERRITIN SERPL-MCNC: 8.8 NG/ML (ref 10–291)
GFR SERPLBLD CREATININE-BSD FMLA CKD-EPI: 94 ML/MIN/1.73 M 2
GLOBULIN SER CALC-MCNC: 3.1 G/DL (ref 1.9–3.5)
GLUCOSE SERPL-MCNC: 82 MG/DL (ref 65–99)
HCT VFR BLD AUTO: 39.1 % (ref 37–47)
HGB BLD-MCNC: 12.2 G/DL (ref 12–16)
IMM GRANULOCYTES # BLD AUTO: 0.02 K/UL (ref 0–0.11)
IMM GRANULOCYTES NFR BLD AUTO: 0.3 % (ref 0–0.9)
IRON SATN MFR SERPL: 7 % (ref 15–55)
IRON SERPL-MCNC: 33 UG/DL (ref 40–170)
LYMPHOCYTES # BLD AUTO: 2.27 K/UL (ref 1–4.8)
LYMPHOCYTES NFR BLD: 29.3 % (ref 22–41)
MCH RBC QN AUTO: 24.3 PG (ref 27–33)
MCHC RBC AUTO-ENTMCNC: 31.2 G/DL (ref 32.2–35.5)
MCV RBC AUTO: 77.9 FL (ref 81.4–97.8)
MONOCYTES # BLD AUTO: 0.55 K/UL (ref 0–0.85)
MONOCYTES NFR BLD AUTO: 7.1 % (ref 0–13.4)
NEUTROPHILS # BLD AUTO: 4.57 K/UL (ref 1.82–7.42)
NEUTROPHILS NFR BLD: 59 % (ref 44–72)
NRBC # BLD AUTO: 0 K/UL
NRBC BLD-RTO: 0 /100 WBC (ref 0–0.2)
PLATELET # BLD AUTO: 443 K/UL (ref 164–446)
PMV BLD AUTO: 10.5 FL (ref 9–12.9)
POTASSIUM SERPL-SCNC: 4.1 MMOL/L (ref 3.6–5.5)
PROT SERPL-MCNC: 7.5 G/DL (ref 6–8.2)
RBC # BLD AUTO: 5.02 M/UL (ref 4.2–5.4)
SODIUM SERPL-SCNC: 138 MMOL/L (ref 135–145)
TIBC SERPL-MCNC: 445 UG/DL (ref 250–450)
UIBC SERPL-MCNC: 412 UG/DL (ref 110–370)
WBC # BLD AUTO: 7.8 K/UL (ref 4.8–10.8)

## 2024-08-21 PROCEDURE — 83540 ASSAY OF IRON: CPT

## 2024-08-21 PROCEDURE — 82728 ASSAY OF FERRITIN: CPT

## 2024-08-21 PROCEDURE — 83550 IRON BINDING TEST: CPT

## 2024-08-21 PROCEDURE — 36415 COLL VENOUS BLD VENIPUNCTURE: CPT

## 2024-08-21 PROCEDURE — 80053 COMPREHEN METABOLIC PANEL: CPT

## 2024-08-21 PROCEDURE — 85025 COMPLETE CBC W/AUTO DIFF WBC: CPT

## 2025-01-26 ENCOUNTER — HOSPITAL ENCOUNTER (OUTPATIENT)
Dept: RADIOLOGY | Facility: MEDICAL CENTER | Age: 45
End: 2025-01-26
Attending: OBSTETRICS & GYNECOLOGY
Payer: COMMERCIAL

## 2025-01-26 DIAGNOSIS — R92.0 MAMMOGRAPHIC MICROCALCIFICATION: ICD-10-CM

## 2025-01-26 PROCEDURE — 76830 TRANSVAGINAL US NON-OB: CPT

## 2025-04-18 ENCOUNTER — APPOINTMENT (OUTPATIENT)
Dept: URBAN - METROPOLITAN AREA CLINIC 15 | Facility: CLINIC | Age: 45
Setting detail: DERMATOLOGY
End: 2025-04-18

## 2025-04-18 DIAGNOSIS — D18.0 HEMANGIOMA: ICD-10-CM

## 2025-04-18 DIAGNOSIS — L73.8 OTHER SPECIFIED FOLLICULAR DISORDERS: ICD-10-CM

## 2025-04-18 DIAGNOSIS — Z71.89 OTHER SPECIFIED COUNSELING: ICD-10-CM

## 2025-04-18 DIAGNOSIS — L91.8 OTHER HYPERTROPHIC DISORDERS OF THE SKIN: ICD-10-CM

## 2025-04-18 DIAGNOSIS — L81.4 OTHER MELANIN HYPERPIGMENTATION: ICD-10-CM

## 2025-04-18 DIAGNOSIS — L20.89 OTHER ATOPIC DERMATITIS: ICD-10-CM

## 2025-04-18 DIAGNOSIS — L82.1 OTHER SEBORRHEIC KERATOSIS: ICD-10-CM

## 2025-04-18 DIAGNOSIS — D22 MELANOCYTIC NEVI: ICD-10-CM

## 2025-04-18 PROBLEM — D22.71 MELANOCYTIC NEVI OF RIGHT LOWER LIMB, INCLUDING HIP: Status: ACTIVE | Noted: 2025-04-18

## 2025-04-18 PROBLEM — D18.01 HEMANGIOMA OF SKIN AND SUBCUTANEOUS TISSUE: Status: ACTIVE | Noted: 2025-04-18

## 2025-04-18 PROBLEM — D22.72 MELANOCYTIC NEVI OF LEFT LOWER LIMB, INCLUDING HIP: Status: ACTIVE | Noted: 2025-04-18

## 2025-04-18 PROBLEM — D22.5 MELANOCYTIC NEVI OF TRUNK: Status: ACTIVE | Noted: 2025-04-18

## 2025-04-18 PROBLEM — D22.62 MELANOCYTIC NEVI OF LEFT UPPER LIMB, INCLUDING SHOULDER: Status: ACTIVE | Noted: 2025-04-18

## 2025-04-18 PROBLEM — D22.61 MELANOCYTIC NEVI OF RIGHT UPPER LIMB, INCLUDING SHOULDER: Status: ACTIVE | Noted: 2025-04-18

## 2025-04-18 PROCEDURE — ? ADDITIONAL NOTES

## 2025-04-18 PROCEDURE — ? COUNSELING

## 2025-04-18 PROCEDURE — 99203 OFFICE O/P NEW LOW 30 MIN: CPT

## 2025-04-18 ASSESSMENT — LOCATION DETAILED DESCRIPTION DERM
LOCATION DETAILED: LEFT POPLITEAL SKIN
LOCATION DETAILED: LEFT INFERIOR LATERAL MIDBACK
LOCATION DETAILED: XIPHOID
LOCATION DETAILED: LEFT MEDIAL UPPER BACK
LOCATION DETAILED: LEFT DISTAL PRETIBIAL REGION
LOCATION DETAILED: RIGHT MEDIAL SUPERIOR CHEST
LOCATION DETAILED: RIGHT ANTERIOR DISTAL UPPER ARM
LOCATION DETAILED: RIGHT INFERIOR CENTRAL MALAR CHEEK
LOCATION DETAILED: RIGHT PROXIMAL POSTERIOR UPPER ARM
LOCATION DETAILED: LEFT SUPERIOR MEDIAL MIDBACK
LOCATION DETAILED: RIGHT AXILLARY VAULT
LOCATION DETAILED: RIGHT POPLITEAL SKIN
LOCATION DETAILED: LEFT INFERIOR MEDIAL UPPER BACK
LOCATION DETAILED: RIGHT ANTERIOR PROXIMAL UPPER ARM
LOCATION DETAILED: LEFT LATERAL PROXIMAL PRETIBIAL REGION
LOCATION DETAILED: RIGHT PROXIMAL PRETIBIAL REGION
LOCATION DETAILED: INFERIOR THORACIC SPINE
LOCATION DETAILED: RIGHT DISTAL POSTERIOR THIGH
LOCATION DETAILED: RIGHT DISTAL PRETIBIAL REGION
LOCATION DETAILED: SUBXIPHOID
LOCATION DETAILED: RIGHT ANTECUBITAL SKIN
LOCATION DETAILED: LEFT ANTERIOR PROXIMAL UPPER ARM
LOCATION DETAILED: LEFT ANTERIOR DISTAL UPPER ARM
LOCATION DETAILED: SUPERIOR THORACIC SPINE
LOCATION DETAILED: LEFT DISTAL POSTERIOR THIGH
LOCATION DETAILED: RIGHT MEDIAL FOREHEAD
LOCATION DETAILED: RIGHT ANTERIOR PROXIMAL THIGH

## 2025-04-18 ASSESSMENT — LOCATION SIMPLE DESCRIPTION DERM
LOCATION SIMPLE: LEFT POPLITEAL SKIN
LOCATION SIMPLE: RIGHT PRETIBIAL REGION
LOCATION SIMPLE: RIGHT CHEEK
LOCATION SIMPLE: LEFT UPPER BACK
LOCATION SIMPLE: RIGHT POSTERIOR UPPER ARM
LOCATION SIMPLE: RIGHT AXILLARY VAULT
LOCATION SIMPLE: RIGHT THIGH
LOCATION SIMPLE: LEFT UPPER ARM
LOCATION SIMPLE: UPPER BACK
LOCATION SIMPLE: LEFT LOWER BACK
LOCATION SIMPLE: RIGHT UPPER ARM
LOCATION SIMPLE: CHEST
LOCATION SIMPLE: RIGHT POSTERIOR THIGH
LOCATION SIMPLE: LEFT POSTERIOR THIGH
LOCATION SIMPLE: ABDOMEN
LOCATION SIMPLE: RIGHT POPLITEAL SKIN
LOCATION SIMPLE: RIGHT FOREHEAD
LOCATION SIMPLE: LEFT PRETIBIAL REGION

## 2025-04-18 ASSESSMENT — LOCATION ZONE DERM
LOCATION ZONE: AXILLAE
LOCATION ZONE: TRUNK
LOCATION ZONE: FACE
LOCATION ZONE: LEG
LOCATION ZONE: ARM

## 2025-06-19 ENCOUNTER — HOSPITAL ENCOUNTER (OUTPATIENT)
Dept: LAB | Facility: MEDICAL CENTER | Age: 45
End: 2025-06-19
Attending: OBSTETRICS & GYNECOLOGY
Payer: COMMERCIAL

## 2025-06-19 LAB
ALBUMIN SERPL BCP-MCNC: 4.2 G/DL (ref 3.2–4.9)
ALBUMIN/GLOB SERPL: 1.3 G/DL
ALP SERPL-CCNC: 91 U/L (ref 30–99)
ALT SERPL-CCNC: 18 U/L (ref 2–50)
ANION GAP SERPL CALC-SCNC: 12 MMOL/L (ref 7–16)
APTT PPP: 32.3 SEC (ref 24.7–36)
AST SERPL-CCNC: 27 U/L (ref 12–45)
BASOPHILS # BLD AUTO: 0.7 % (ref 0–1.8)
BASOPHILS # BLD: 0.06 K/UL (ref 0–0.12)
BILIRUB SERPL-MCNC: <0.2 MG/DL (ref 0.1–1.5)
BUN SERPL-MCNC: 11 MG/DL (ref 8–22)
CALCIUM ALBUM COR SERPL-MCNC: 9.2 MG/DL (ref 8.5–10.5)
CALCIUM SERPL-MCNC: 9.4 MG/DL (ref 8.5–10.5)
CHLORIDE SERPL-SCNC: 102 MMOL/L (ref 96–112)
CO2 SERPL-SCNC: 24 MMOL/L (ref 20–33)
CREAT SERPL-MCNC: 0.89 MG/DL (ref 0.5–1.4)
EOSINOPHIL # BLD AUTO: 0.19 K/UL (ref 0–0.51)
EOSINOPHIL NFR BLD: 2.3 % (ref 0–6.9)
ERYTHROCYTE [DISTWIDTH] IN BLOOD BY AUTOMATED COUNT: 44.8 FL (ref 35.9–50)
FASTING STATUS PATIENT QL REPORTED: NORMAL
GFR SERPLBLD CREATININE-BSD FMLA CKD-EPI: 81 ML/MIN/1.73 M 2
GLOBULIN SER CALC-MCNC: 3.3 G/DL (ref 1.9–3.5)
GLUCOSE SERPL-MCNC: 152 MG/DL (ref 65–99)
HCT VFR BLD AUTO: 41.2 % (ref 37–47)
HGB BLD-MCNC: 12.4 G/DL (ref 12–16)
IMM GRANULOCYTES # BLD AUTO: 0.03 K/UL (ref 0–0.11)
IMM GRANULOCYTES NFR BLD AUTO: 0.4 % (ref 0–0.9)
INR PPP: 1 (ref 0.87–1.13)
LYMPHOCYTES # BLD AUTO: 2.09 K/UL (ref 1–4.8)
LYMPHOCYTES NFR BLD: 25.4 % (ref 22–41)
MCH RBC QN AUTO: 22.8 PG (ref 27–33)
MCHC RBC AUTO-ENTMCNC: 30.1 G/DL (ref 32.2–35.5)
MCV RBC AUTO: 75.6 FL (ref 81.4–97.8)
MONOCYTES # BLD AUTO: 0.42 K/UL (ref 0–0.85)
MONOCYTES NFR BLD AUTO: 5.1 % (ref 0–13.4)
NEUTROPHILS # BLD AUTO: 5.45 K/UL (ref 1.82–7.42)
NEUTROPHILS NFR BLD: 66.1 % (ref 44–72)
NRBC # BLD AUTO: 0 K/UL
NRBC BLD-RTO: 0 /100 WBC (ref 0–0.2)
PLATELET # BLD AUTO: 477 K/UL (ref 164–446)
PMV BLD AUTO: 10.2 FL (ref 9–12.9)
POTASSIUM SERPL-SCNC: 3.9 MMOL/L (ref 3.6–5.5)
PROT SERPL-MCNC: 7.5 G/DL (ref 6–8.2)
PROTHROMBIN TIME: 13.2 SEC (ref 12–14.6)
RBC # BLD AUTO: 5.45 M/UL (ref 4.2–5.4)
SODIUM SERPL-SCNC: 138 MMOL/L (ref 135–145)
WBC # BLD AUTO: 8.2 K/UL (ref 4.8–10.8)

## 2025-06-19 PROCEDURE — 36415 COLL VENOUS BLD VENIPUNCTURE: CPT

## 2025-06-19 PROCEDURE — 85025 COMPLETE CBC W/AUTO DIFF WBC: CPT

## 2025-06-19 PROCEDURE — 80053 COMPREHEN METABOLIC PANEL: CPT

## 2025-06-19 PROCEDURE — 85730 THROMBOPLASTIN TIME PARTIAL: CPT

## 2025-06-19 PROCEDURE — 85610 PROTHROMBIN TIME: CPT

## 2025-08-15 ENCOUNTER — APPOINTMENT (OUTPATIENT)
Dept: RADIOLOGY | Facility: MEDICAL CENTER | Age: 45
End: 2025-08-15
Payer: COMMERCIAL

## 2025-08-15 VITALS — HEIGHT: 62 IN | BODY MASS INDEX: 34.96 KG/M2 | WEIGHT: 190 LBS

## 2025-08-15 DIAGNOSIS — Z12.31 VISIT FOR SCREENING MAMMOGRAM: ICD-10-CM

## 2025-08-15 PROCEDURE — 77067 SCR MAMMO BI INCL CAD: CPT

## 2025-08-15 ASSESSMENT — FIBROSIS 4 INDEX: FIB4 SCORE: 0.73
